# Patient Record
Sex: FEMALE | Race: WHITE | NOT HISPANIC OR LATINO | Employment: OTHER | URBAN - METROPOLITAN AREA
[De-identification: names, ages, dates, MRNs, and addresses within clinical notes are randomized per-mention and may not be internally consistent; named-entity substitution may affect disease eponyms.]

---

## 2023-02-27 PROBLEM — R05.9 COUGH: Status: ACTIVE | Noted: 2023-02-27

## 2023-02-27 PROBLEM — E78.5 HYPERLIPIDEMIA, MILD: Status: ACTIVE | Noted: 2023-02-27

## 2023-02-27 PROBLEM — R00.2 PALPITATIONS: Status: ACTIVE | Noted: 2023-02-27

## 2023-02-27 PROBLEM — C18.1 PRIMARY CANCER OF APPENDIX (MULTI): Status: ACTIVE | Noted: 2023-02-27

## 2023-02-27 PROBLEM — I10 BENIGN ESSENTIAL HYPERTENSION: Status: ACTIVE | Noted: 2023-02-27

## 2023-02-27 PROBLEM — H90.3 SENSORINEURAL HEARING LOSS, BILATERAL: Status: ACTIVE | Noted: 2023-02-27

## 2023-02-27 PROBLEM — J98.8 RESPIRATORY TRACT INFECTION: Status: ACTIVE | Noted: 2023-02-27

## 2023-02-27 PROBLEM — J45.909 ASTHMA (HHS-HCC): Status: ACTIVE | Noted: 2023-02-27

## 2023-02-27 PROBLEM — E04.1 THYROID NODULE: Status: ACTIVE | Noted: 2023-02-27

## 2023-02-27 PROBLEM — H93.13 TINNITUS, SUBJECTIVE, BILATERAL: Status: ACTIVE | Noted: 2023-02-27

## 2023-02-27 RX ORDER — ALBUTEROL SULFATE 0.83 MG/ML
3 SOLUTION RESPIRATORY (INHALATION) EVERY 6 HOURS PRN
COMMUNITY
End: 2023-06-23 | Stop reason: WASHOUT

## 2023-02-27 RX ORDER — MULTIVITAMIN
1 TABLET ORAL DAILY
COMMUNITY

## 2023-02-27 RX ORDER — LISINOPRIL 20 MG/1
1 TABLET ORAL DAILY
COMMUNITY
Start: 2014-01-23 | End: 2023-04-14 | Stop reason: SDUPTHER

## 2023-02-27 RX ORDER — MONTELUKAST SODIUM 10 MG/1
10 TABLET ORAL NIGHTLY
COMMUNITY

## 2023-02-27 RX ORDER — ALBUTEROL SULFATE 90 UG/1
1 AEROSOL, METERED RESPIRATORY (INHALATION) EVERY 4 HOURS PRN
COMMUNITY
Start: 2013-03-15 | End: 2023-04-14 | Stop reason: SDUPTHER

## 2023-02-27 RX ORDER — ASCORBIC ACID 500 MG
250 TABLET,CHEWABLE ORAL DAILY
COMMUNITY
Start: 2021-08-02

## 2023-02-27 RX ORDER — CHOLECALCIFEROL (VITAMIN D3) 125 MCG
125 CAPSULE ORAL DAILY
COMMUNITY
Start: 2019-03-22

## 2023-03-10 ENCOUNTER — TELEPHONE (OUTPATIENT)
Dept: PRIMARY CARE | Facility: CLINIC | Age: 63
End: 2023-03-10
Payer: MEDICARE

## 2023-03-10 NOTE — TELEPHONE ENCOUNTER
Pt states was advised to  CB and give you an update on symptoms. Pt states feels like symptoms are anxiety related.

## 2023-03-15 ENCOUNTER — OFFICE VISIT (OUTPATIENT)
Dept: PRIMARY CARE | Facility: CLINIC | Age: 63
End: 2023-03-15
Payer: MEDICARE

## 2023-03-15 VITALS
RESPIRATION RATE: 17 BRPM | TEMPERATURE: 98 F | WEIGHT: 144 LBS | DIASTOLIC BLOOD PRESSURE: 70 MMHG | HEIGHT: 66 IN | HEART RATE: 78 BPM | OXYGEN SATURATION: 96 % | SYSTOLIC BLOOD PRESSURE: 128 MMHG | BODY MASS INDEX: 23.14 KG/M2

## 2023-03-15 DIAGNOSIS — F41.1 GENERALIZED ANXIETY DISORDER: Primary | ICD-10-CM

## 2023-03-15 DIAGNOSIS — I10 BENIGN ESSENTIAL HYPERTENSION: ICD-10-CM

## 2023-03-15 PROCEDURE — 3078F DIAST BP <80 MM HG: CPT | Performed by: FAMILY MEDICINE

## 2023-03-15 PROCEDURE — 1036F TOBACCO NON-USER: CPT | Performed by: FAMILY MEDICINE

## 2023-03-15 PROCEDURE — 3074F SYST BP LT 130 MM HG: CPT | Performed by: FAMILY MEDICINE

## 2023-03-15 PROCEDURE — 1160F RVW MEDS BY RX/DR IN RCRD: CPT | Performed by: FAMILY MEDICINE

## 2023-03-15 PROCEDURE — 1159F MED LIST DOCD IN RCRD: CPT | Performed by: FAMILY MEDICINE

## 2023-03-15 PROCEDURE — 99213 OFFICE O/P EST LOW 20 MIN: CPT | Performed by: FAMILY MEDICINE

## 2023-03-15 ASSESSMENT — ANXIETY QUESTIONNAIRES
4. TROUBLE RELAXING: SEVERAL DAYS
IF YOU CHECKED OFF ANY PROBLEMS ON THIS QUESTIONNAIRE, HOW DIFFICULT HAVE THESE PROBLEMS MADE IT FOR YOU TO DO YOUR WORK, TAKE CARE OF THINGS AT HOME, OR GET ALONG WITH OTHER PEOPLE: SOMEWHAT DIFFICULT
2. NOT BEING ABLE TO STOP OR CONTROL WORRYING: SEVERAL DAYS
GAD7 TOTAL SCORE: 6
3. WORRYING TOO MUCH ABOUT DIFFERENT THINGS: SEVERAL DAYS
5. BEING SO RESTLESS THAT IT IS HARD TO SIT STILL: NOT AT ALL
7. FEELING AFRAID AS IF SOMETHING AWFUL MIGHT HAPPEN: NOT AT ALL
6. BECOMING EASILY ANNOYED OR IRRITABLE: NOT AT ALL
1. FEELING NERVOUS, ANXIOUS, OR ON EDGE: NEARLY EVERY DAY

## 2023-03-15 ASSESSMENT — PATIENT HEALTH QUESTIONNAIRE - PHQ9
7. TROUBLE CONCENTRATING ON THINGS, SUCH AS READING THE NEWSPAPER OR WATCHING TELEVISION: NOT AT ALL
SUM OF ALL RESPONSES TO PHQ QUESTIONS 1-9: 9
2. FEELING DOWN, DEPRESSED OR HOPELESS: MORE THAN HALF THE DAYS
1. LITTLE INTEREST OR PLEASURE IN DOING THINGS: SEVERAL DAYS
SUM OF ALL RESPONSES TO PHQ9 QUESTIONS 1 & 2: 3
4. FEELING TIRED OR HAVING LITTLE ENERGY: NEARLY EVERY DAY
9. THOUGHTS THAT YOU WOULD BE BETTER OFF DEAD, OR OF HURTING YOURSELF: NOT AT ALL
3. TROUBLE FALLING OR STAYING ASLEEP: NOT AT ALL
5. POOR APPETITE OR OVEREATING: MORE THAN HALF THE DAYS
8. MOVING OR SPEAKING SO SLOWLY THAT OTHER PEOPLE COULD HAVE NOTICED. OR THE OPPOSITE, BEING SO FIGETY OR RESTLESS THAT YOU HAVE BEEN MOVING AROUND A LOT MORE THAN USUAL: NOT AT ALL
6. FEELING BAD ABOUT YOURSELF - OR THAT YOU ARE A FAILURE OR HAVE LET YOURSELF OR YOUR FAMILY DOWN: SEVERAL DAYS
10. IF YOU CHECKED OFF ANY PROBLEMS, HOW DIFFICULT HAVE THESE PROBLEMS MADE IT FOR YOU TO DO YOUR WORK, TAKE CARE OF THINGS AT HOME, OR GET ALONG WITH OTHER PEOPLE: SOMEWHAT DIFFICULT

## 2023-03-15 NOTE — ASSESSMENT & PLAN NOTE
Blood pressure improved.  Suspect worsening numbers anxiety related.  Continue present meds  Reviewed all labs  Treatment as above.

## 2023-03-15 NOTE — PATIENT INSTRUCTIONS
New onset generalized anxiety disorder  Questionable etiology.  Reviewed all labs.  Chest x-ray normal.  Referring to behavioral health for psychotherapy.  Briefly discussed medication and side effects.  Declined at this time.  Follow-up as scheduled

## 2023-03-15 NOTE — PROGRESS NOTES
"Subjective   Patient ID: Reta Davis is a 63 y.o. female who presents for Blood Pressure Check (BP/ anxiety check).    Here with c/o anxiety    Was seen a few weeks ago for upper resp inf   Cxr normal  Bp elevated    Getting sob feelings  Was seen a few weeks ago and thoght was respiratory  Cxr neg  Feeling anxious and nervous  Feelings of skipped beats  Occ nausea, knot in her stomach  Fatiqued and no energy    Reviewed DMITRI 7  Does not feel depressed  No hx of anxiety in the past     Turned 63 and emotional  Retired 2 years ago  Neighbor passed of cancer recent, pancreatic  Hospice for a few weeks  She wonders if PTSD with what she went through   surgery   Wonders if all accumulating     has bad anxiety  Kids have anxiety  Brother medicated for anxiety  Sister anxiety but not treated.               Review of Systems    Objective   /70   Pulse 78   Temp 36.7 °C (98 °F)   Resp 17   Ht 1.664 m (5' 5.5\")   Wt 65.3 kg (144 lb)   SpO2 96%   BMI 23.60 kg/m²     Physical Exam  Psychiatric:         Mood and Affect: Mood is anxious. Affect is tearful.         Thought Content: Thought content normal.         Judgment: Judgment normal.         Assessment/Plan   Problem List Items Addressed This Visit          Circulatory    Benign essential hypertension     Blood pressure improved.  Suspect worsening numbers anxiety related.  Continue present meds  Reviewed all labs  Treatment as above.          Other Visit Diagnoses       Generalized anxiety disorder    -  Primary    Uncontrolled  Long discussion regarding medication use and side effect.  Referring to behavioral health for psychotherapy.  She will call if symptoms worsening    Relevant Orders    Follow Up In [facility]        New onset generalized anxiety disorder  Questionable etiology.  Reviewed all labs.  Chest x-ray normal.  Referring to behavioral health for psychotherapy.  Briefly " discussed medication and side effects.  Declined at this time.  Follow-up as scheduled

## 2023-03-27 ENCOUNTER — SOCIAL WORK (OUTPATIENT)
Dept: PRIMARY CARE | Facility: CLINIC | Age: 63
End: 2023-03-27
Payer: MEDICARE

## 2023-03-27 DIAGNOSIS — F41.1 GENERALIZED ANXIETY DISORDER: ICD-10-CM

## 2023-03-27 NOTE — PROGRESS NOTES
"Collaborative Care (CoCM) Initial Assessment    Session Time  Start: 8:55 AM  End: 10:06 AM     Collaborative Care program information (including case discussion with psychiatry, involvement of [facility] and billing when applicable) was provided and discussed with the patient. Patient Indicated understanding and agreed to proceed.   Confirm: Yes    No data recorded  DMITRI: 6; somewhat difficult      Reason for Visit / Chief Complaint  Chief Complaint   Patient presents with    Initial Assessment    Anxiety       Accompanied by: Self  Guardian Status: Self    Unsure if anxiety or asthma/allergies or anxiety  Hx of anxiety in family  Sob, tingly, cold hands; loss of interest  A week with no appetite, loss of interest  Situational anxiety? Panic attack?  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  Has looked at what using- perfume, laundry degergent, etc.  Some days ok, some \"get feeling of tingling across chest, arms\"  Tried deep breathing, ignoring it  Cancer 5 years ago- no problems  Year ago- next door neighbor dx with cancer,  earlier this month  Is it anxiety bringing on asthma or asthma bringing anxiety      Review of Symptoms    Sleep   Average Hours Sleep in/Night: \"6-7 hrs sleep; once I fall asleep I sleep ok\" wakes up during the night but typically falls back asleep. Reports typically waking rested, but \"some mornings I drag myself to the shower\".  Bed is propped up    Mood   Symptom Onset/Duration: Last 1-2 years - symptoms appear to have really started when patient retired from her job.  Current Sx: little interest/pleasure doing things, feeling down, feeling depressed, trouble falling asleep, low motivation, poor appetite, feeling bad about self, isolating from others, and unhelpful thinking " "pattern  Triggers: no known triggers    Sometimes feels inadequate; lost 8 lbs. \"I don't hate myself, always want to be something better than you are\" \"underachieving. 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  Current Sx: feeling nervous/anxious/on edge, difficulty stopping/controlling worry, negative thought of self, avoidance, and panic attack(s); always hesitant to be in big crowds like [employer], prefer to stay at home- always been more of a homebody  Panic / Somatic Sx: chest pain/discomfort, shaking/jitters, dry mouth, and abdominal pain  Triggers: large crowds, being around others  Nothing in particular to cause episodes of anxiety/panic- at home, going into a store- unsure if subconscious is triggering anxiety- \"sitting in living room watching tv\" \"riding to store\" \"throwing out garbage\"    Self-Esteem / Self-Image   Self Esteem Rating (1-10 Scale, 10 being high): 7  Self-Esteem / Self Image Sx: feels useless at times and compares self to others; Puts pressure on self; trying to cut back on expectations of holidays, etc.    Appetite   Description of Overall Appetite: decreased appetite  Concerns with appetite: loss of appetite and not feeling hungry often  When hungry, will get irritable    Anger / Irritability  Symptoms of Anger / Irritability: denies major concerns    Communication / Self Expression  Communication Style & Concerns: no concerns    Trauma    Traumatic Experiences: serious life threatening illness  Current Symptoms Related to Traumatic Experience: " denies    Grief / Loss / Adjustment   Denies actively grieving any losses currently.  Patient reported that she has had a lot of losses in her life over the past years, particularly family members  Neighbor recently - had cancer  YEYO    Mom-   Sister   MIL-     Hallucinations / Delusions   Hallucinations & Delusions Experienced: none, denied    Learning Concerns / Memory   Learning Concerns & Sx: none, denied  Memory Concerns & Sx: none, denied    Functional impairment   Impacting ADL's: no impairment   Impacting IADL's: No impairment  Impacting Ability : No impairment    Associated Medical Concerns   Potential Associated Factors: hyperlipidemia and hypertension; asthma, previous cancer dx      Comprehensive Behavioral Health History     Medications  Current Mental Health Medications:   none    Past Mental Health Medications: none      Concerns / challenges / barriers with taking medications?     Open to medication recommendations from consulting psychiatrist? Not sure; doesn't like taking medication- side effects are worse than pain      Mental Health Treatment History  Mental Health Treatment: no hx    Risk History  Suicidal Thoughts/Method/Intent/Plan: None, denied  Suicide Attempts/Preparations: None, denied  Non-Suicidal Self Injury: None, denied  Last Amlin Risk Score:    Protective Factors: strong protective factors, social support/connectedness, positive family relationships, and marriage/partnership    Violence: None, denied  Homicidal Thoughts/Method/Plan/Intent: None, denied  Homicidal Attempts/Preparations: None, denied        Substance Use History    Substances    Social History     Substance and Sexual Activity   Alcohol Use Not Currently     Social History     Substance and Sexual Activity   Drug Use Not Currently       Substance Current Use   Denies use                    Addiction Treatment   Denies    Family History    Mental Health / Conditions    Family Member Condition  "/ Diagnosis Medications / Side Effects   Spouse Anxiety disorder; goes back to childhood and worked through it with counseling. Did not use medication None/Unknown   Children  Daughter- \"go in a room and cry\"  Son- \"sit away from others til passes\" Anxiety disorder None/Unknown   Mother   Anxiety disorder; got a \"real nervous stomach\" Valium          Substance Use    Family Member Substance Current Use   Denies                        History of Suicide    Family Member Details   Denies            Social History    Housing   Living Situation: lives with spouse and adult son  Safe Housing Conditions / Feels Safe in Home: Yes    Employment  Current Employment: retired  Current Concerns/Challenges: No    Income   Current Concerns/Challenges: No    Education   Status / Level of Education: Completed high school and GED    Legal   Legal Considerations: None, denied    Relationships   S/O:  has a positive relationship with   Children: 2 adult children, daughter  and son who lives at home.  Friends: Reports she does not have many close friendships.         Active Duty? No    Sexuality / Gender   Concerns with Sexuality/Gender: None, denied    Transportation   Transportation Concerns: None, denied    Jehovah's witness/ Spirituality   Are you Rastafari or Spiritual: Yes  Jehovah's witness / Practice: Anabaptism doesn't go to [employer] weekly, spirituality helped during cancer treatment  Spiritual Practice: None, denied    Coping / Strengths / Supports   Coping:  breathing exercises; positive thoughts \"It's going to pass\"  Strengths: forgiving, good listener, and honest running the household- paying bills, organization- OCD  Supports: Spouse      Abuse History  Physical Abuse: No  Sexual Abuse: No  Verbal / Emotional Abuse / Bullying (+Cyber): No   Financial Abuse: No  Domestic Violence: No    Assessment Summary  / Plan    Assessment Summary:  What do you want to work on/get out of collaborative care? A feeling that anxiety can " "be controlled, if that's what it is. Anything I can do for myself to help- get involved in activities, hobbies\"    Plan:   Psych consult - ongoing, set meeting frequency, bi-weekly, Mgtsrkh-Nxzswtvs-Ajepritd interventions, provide psycho-education, provide appropriate tx referrals, and provide appropriate resources    No follow-ups on file.    Provisional Findings / Impressions  Primary: F41.9 Unspecified Anxiety D/O    Secondary:     Goals    Care Plan    There is no care plan documentation to display.       "

## 2023-03-29 ENCOUNTER — DOCUMENTATION (OUTPATIENT)
Dept: BEHAVIORAL HEALTH | Facility: CLINIC | Age: 63
End: 2023-03-29
Payer: MEDICARE

## 2023-03-29 NOTE — Clinical Note
DONNY regarding my recommendations based on our team's discussion in collaborative care; in brief we're going to focus on therapeutic engagement for now and hold off on starting psychotropic meds at this time. My one medical suggestion is to make sure she's not worsening her anxiety symptoms by treating anxiety/panic-related dyspnea with her albuterol - which could exacerbate tachycardia and worsen the spiral of symptoms.   Please feel free to reach out with any questions / comments / feedback / concerns.   Nic, Conner

## 2023-03-29 NOTE — PROGRESS NOTES
[facility] Psychiatry Consult Note     Reta Davis is a 63 y.o., referred to Collaborative Care for symptoms of anxiety and depression. I have reviewed the patient with the behavioral health manager and reviewed the patient's electronic record. Pertinent highlights of discussion and chart review include the following:   Sx: Episodes of anxiety (tingly hands, etc) that occur without identified triggers; patient unsure if anxiety triggers asthma, or the other way around. Avoids things that could induce panic (e.g., large crowds). Variable appetite - will go for a week with low appetite. Sometimes feels inadequate / underachieving (especially since retiring).   Sleep: difficulty with sleep initiation; sleeps propped up (attributes to asthma)  Substances: not pertinent   Context: retired ~2yrs ago, with subsequent decrease in both scheduled activities as well as socialization   Trauma: Significant losses in past ~15-20yrs (mother, sister, sister in law, mother in law, neighbor). Had cancer 5 yrs ago (currently cancer free); neighbor diagnosed with cancer last year, and  earlier this month.   No history of prior  care / concerns.   Current psych medications: none    Prior psych medications: none   Patient wishes: In general doesn't like taking meds (in general feels that AEs are worse than the med benefits); prefers to avoid psychotropic medications but open if necessary. Wants to feel that anxiety can be controlled.     Patient Health Questionnaire-9 Score: 9 (3/15/2023  4:21 PM)    Additional data:   Recent labs: CBC and TSH not pertinent 23   Pertinent PMH: asthma   Pertinent non-psychiatric meds: albuterol      Recommendations:   Behavioral health manager (BHM) to continue to engage with patient   Will defer initiation of psychotropic medications at this time - will follow response to therapeutic engagement with BHM at this time.   Recommend minimizing use of PRN albuterol in response to anxiety-induced  dyspnea due to likely subsequent exacerbation of tachycardia, potentially leading to further anxiety worsening  Recommend BHM use motivational interviewing to facilitate healthy lifestyle choices (e.g. consistent engagement with mental health services), explore pattern of symptoms and associated stressors / life events, and engage in behavioral activation as discussed in case review    The above treatment considerations and suggestions are based on consultations with the patient's care manager and a review of information available in the electronic medical record. I have not personally examined the patient. All recommendations should be implemented with consideration of the patient's relevant prior history and current clinical status. Please feel free to call me with any questions about the care of this patient. I can easily be reached through [employer], or via email ([email or web address]).

## 2023-04-03 ENCOUNTER — SOCIAL WORK (OUTPATIENT)
Dept: PRIMARY CARE | Facility: CLINIC | Age: 63
End: 2023-04-03
Payer: MEDICARE

## 2023-04-03 NOTE — PROGRESS NOTES
Collaborative Care (CoCM)  Progress Note    Type of Interaction: In Office    Start Time: 9:00 AM    End Time: 9:25 AM        Appointment: Scheduled    Reason for Visit:   Chief Complaint   Patient presents with    Follow-up    Anxiety          Interval History / Patient Symptoms:     No data recorded    Patient reports she has reduced number of times she uses inhaler; only uses when coughing and not as preventative measure  Last used inhaler on Friday, was previously using every AM and PM.  States that her anxiety symptoms have reduced and has not had any episodes of anxiety.    Reports she is not using Singulair currently and is using less cough syrup (robitussin) for cough    Interventions Provided: Treatment Planning      Progress Made: N/A    Response to Intervention: Discussed Psych Consult recommendations.  Patient is willing to engage with [facility]; would like to start at every other week and then possibly monthly, depending on how she is doing.  In agreement with Behavioral Activation and motivational interviewing to explore interests and activities that patient can do now that she is retired.  In agreement to hold of on anxiety medications for now.        Plan:     Care Plan    There is no care plan documentation to display.         Patient Instructions   Follow up in 2 weeks, prior to PCP appointment- 4/14/23 @ 11:00am  Monitor/track moods and symptoms prior to and after using inhaler as a way to determine anxiety vs. Asthma reaction  Identify activities and areas of interest to further explore and pursue at next appointment.      Follow Up / Next Appointment: Next appointment: 04/14/23 @ 11:00 AM

## 2023-04-03 NOTE — PROGRESS NOTES
Collaborative Care (CoCM)  Progress Note    Type of Interaction: {Type of Interaction:40634}    Start Time: ***    End Time: ***        Appointment: {Appointment:90064}    Reason for Visit: No chief complaint on file.   ***      Interval History / Patient Symptoms:     No data recorded      Interventions Provided: {Interventions Provided:72959}      Progress Made: {Progress Made:99792}    Response to Intervention: ***        Plan:     Care Plan    There is no care plan documentation to display.         There are no Patient Instructions on file for this visit.      Follow Up / Next Appointment:

## 2023-04-03 NOTE — PATIENT INSTRUCTIONS
Follow up in 2 weeks, prior to PCP appointment- 4/14/23 @ 11:00am  Monitor/track moods and symptoms prior to and after using inhaler as a way to determine anxiety vs. Asthma reaction  Identify activities and areas of interest to further explore and pursue at next appointment.

## 2023-04-07 ENCOUNTER — DOCUMENTATION (OUTPATIENT)
Dept: PRIMARY CARE | Facility: CLINIC | Age: 63
End: 2023-04-07
Payer: MEDICARE

## 2023-04-07 DIAGNOSIS — F41.9 ANXIETY AND DEPRESSION: Primary | ICD-10-CM

## 2023-04-07 DIAGNOSIS — F32.A ANXIETY AND DEPRESSION: Primary | ICD-10-CM

## 2023-04-07 DIAGNOSIS — F41.1 GENERALIZED ANXIETY DISORDER: ICD-10-CM

## 2023-04-07 PROCEDURE — 99492 1ST PSYC COLLAB CARE MGMT: CPT | Performed by: FAMILY MEDICINE

## 2023-04-14 ENCOUNTER — SOCIAL WORK (OUTPATIENT)
Dept: PRIMARY CARE | Facility: CLINIC | Age: 63
End: 2023-04-14
Payer: MEDICARE

## 2023-04-14 ENCOUNTER — OFFICE VISIT (OUTPATIENT)
Dept: PRIMARY CARE | Facility: CLINIC | Age: 63
End: 2023-04-14
Payer: MEDICARE

## 2023-04-14 VITALS
WEIGHT: 141.6 LBS | BODY MASS INDEX: 22.76 KG/M2 | SYSTOLIC BLOOD PRESSURE: 132 MMHG | OXYGEN SATURATION: 96 % | HEART RATE: 95 BPM | DIASTOLIC BLOOD PRESSURE: 66 MMHG | HEIGHT: 66 IN | RESPIRATION RATE: 16 BRPM | TEMPERATURE: 97.7 F

## 2023-04-14 DIAGNOSIS — I10 BENIGN ESSENTIAL HYPERTENSION: ICD-10-CM

## 2023-04-14 DIAGNOSIS — F41.1 GENERALIZED ANXIETY DISORDER: ICD-10-CM

## 2023-04-14 DIAGNOSIS — J45.40 MODERATE PERSISTENT EXTRINSIC ASTHMA WITHOUT COMPLICATION (HHS-HCC): ICD-10-CM

## 2023-04-14 DIAGNOSIS — F33.9 DEPRESSION, RECURRENT (CMS-HCC): Primary | ICD-10-CM

## 2023-04-14 DIAGNOSIS — E78.5 HYPERLIPIDEMIA, MILD: ICD-10-CM

## 2023-04-14 DIAGNOSIS — L30.9 HAND ECZEMA: ICD-10-CM

## 2023-04-14 DIAGNOSIS — F41.1 GENERALIZED ANXIETY DISORDER: Primary | ICD-10-CM

## 2023-04-14 DIAGNOSIS — R00.2 PALPITATIONS: ICD-10-CM

## 2023-04-14 PROBLEM — R05.9 COUGH: Status: RESOLVED | Noted: 2023-02-27 | Resolved: 2023-04-14

## 2023-04-14 PROBLEM — J98.8 RESPIRATORY TRACT INFECTION: Status: RESOLVED | Noted: 2023-02-27 | Resolved: 2023-04-14

## 2023-04-14 PROCEDURE — 1160F RVW MEDS BY RX/DR IN RCRD: CPT | Performed by: FAMILY MEDICINE

## 2023-04-14 PROCEDURE — 1159F MED LIST DOCD IN RCRD: CPT | Performed by: FAMILY MEDICINE

## 2023-04-14 PROCEDURE — 99214 OFFICE O/P EST MOD 30 MIN: CPT | Performed by: FAMILY MEDICINE

## 2023-04-14 PROCEDURE — 3075F SYST BP GE 130 - 139MM HG: CPT | Performed by: FAMILY MEDICINE

## 2023-04-14 PROCEDURE — 3078F DIAST BP <80 MM HG: CPT | Performed by: FAMILY MEDICINE

## 2023-04-14 PROCEDURE — 1036F TOBACCO NON-USER: CPT | Performed by: FAMILY MEDICINE

## 2023-04-14 RX ORDER — LISINOPRIL 20 MG/1
20 TABLET ORAL DAILY
Qty: 90 TABLET | Refills: 1 | Status: SHIPPED | OUTPATIENT
Start: 2023-04-14 | End: 2023-10-16 | Stop reason: SDUPTHER

## 2023-04-14 RX ORDER — MONTELUKAST SODIUM 10 MG/1
10 TABLET ORAL NIGHTLY
Qty: 30 TABLET | Refills: 5 | Status: SHIPPED | OUTPATIENT
Start: 2023-04-14 | End: 2023-05-26 | Stop reason: ALTCHOICE

## 2023-04-14 RX ORDER — ALBUTEROL SULFATE 90 UG/1
1 AEROSOL, METERED RESPIRATORY (INHALATION) EVERY 4 HOURS PRN
Qty: 18 G | Refills: 3 | Status: SHIPPED | OUTPATIENT
Start: 2023-04-14 | End: 2024-01-19 | Stop reason: SDUPTHER

## 2023-04-14 ASSESSMENT — PATIENT HEALTH QUESTIONNAIRE - PHQ9
1. LITTLE INTEREST OR PLEASURE IN DOING THINGS: NOT AT ALL
2. FEELING DOWN, DEPRESSED OR HOPELESS: NOT AT ALL
SUM OF ALL RESPONSES TO PHQ9 QUESTIONS 1 AND 2: 0

## 2023-04-14 NOTE — PROGRESS NOTES
"Subjective   Patient ID: Reta Davis is a 63 y.o. female who presents for No chief complaint on file..    Here for follow-up hypertension, palpitations and anxiety.    Last visit EKG was normal, labs were normal including TSH  Cholesterol borderline elevated although improved.     Thought palpitations due to anxiety.  Taking meds daily for HTN and tolerating well.  Home BP checks.     Seeing behavioral health here regularly.  Has apt today with Jennifer  Had a bad episode yesterday  Had a feeling of palps and caused anxiousness, loss of appetite, no energy and anxious feeling    Cough and takes 1/2 tsp of robitussin, MDI use at night but cut back  Hx of allergy induced asthma  Singulair use helped in the past     Seeing oncology in May    Dry skin and itching and pain in hands,  Using cortisone 10, neopsorin and creams and not helping           Review of Systems    Objective   /66   Pulse 95   Temp 36.5 °C (97.7 °F)   Resp 16   Ht 1.664 m (5' 5.5\")   Wt 64.2 kg (141 lb 9.6 oz)   SpO2 96%   BMI 23.21 kg/m²     Physical Exam  Vitals and nursing note reviewed.   Constitutional:       Appearance: Normal appearance.   Cardiovascular:      Rate and Rhythm: Normal rate and regular rhythm.   Pulmonary:      Effort: Pulmonary effort is normal.      Breath sounds: Normal breath sounds.   Musculoskeletal:      Cervical back: Normal range of motion.   Neurological:      Mental Status: She is alert.   Psychiatric:         Mood and Affect: Mood normal.         Behavior: Behavior normal.         Thought Content: Thought content normal.         Judgment: Judgment normal.         Assessment/Plan   Problem List Items Addressed This Visit          Respiratory    Extrinsic asthma without complication     Uncontrolled  Trial of Mucinex OTC.  Starting Singulair 10 mg 1 tab daily.  Use and side effect profile reviewed.  Recheck in 6 weeks         Relevant Medications    montelukast (Singulair) 10 mg tablet    albuterol 90 " mcg/actuation inhaler       Circulatory    Benign essential hypertension     Stable  Refilling medication at same dose.  Reviewed all labs.  Recheck in 6 weeks         Relevant Medications    lisinopril 20 mg tablet    Palpitations     Stable  Reviewed all labs.  Reviewed behavioral health notes.  Continue counseling.  Continue to limit MDI use.  Recheck in 6 weeks            Musculoskeletal    Hand eczema     Uncontrolled  Continue topical over-the-counter cortisone creams.  Add strong moisturizing cream.  Recheck in 6 weeks            Other    Hyperlipidemia, mild     Stable  Reviewed all labs.  Continue diet changes.  Recheck in 6 months         Generalized anxiety disorder     Improving.  Continue counseling.  Reviewed psychiatry recommendations  Recheck in 6 weeks         Depression, recurrent (CMS/HCC) - Primary     Improving.  Continue counseling.  Recheck in 6 weeks

## 2023-04-14 NOTE — PROGRESS NOTES
Collaborative Care (CoCM)  Progress Note    Type of Interaction: In Office    Start Time: 12:15 PM    End Time: 1:08 PM    Appointment: Scheduled    Reason for Visit:   Chief Complaint   Patient presents with    Follow-up    Anxiety      Interval History / Patient Symptoms:     No data recorded  Had an episode of panic attack- day before was neighbor's 80th birthday; disinterest in eating; unsure if she puts herself in situations of others?   Some of the anxiety/sadness started when neighbor was dx with cancer- unsure if any of it is medical PTSD on her end      Interventions Provided: Psychoeducation, Strengths Exploration, and Values Exploration      Progress Made: Moderate    Response to Intervention: Discussed grief/loss, empathy and ways to use her empathy in a healthy way.        Plan:     Care Plan    There is no care plan documentation to display.         There are no Patient Instructions on file for this visit.      Follow Up / Next Appointment: Next appointment: 04/28/23

## 2023-04-14 NOTE — ASSESSMENT & PLAN NOTE
Stable  Reviewed all labs.  Reviewed behavioral health notes.  Continue counseling.  Continue to limit MDI use.  Recheck in 6 weeks

## 2023-04-14 NOTE — ASSESSMENT & PLAN NOTE
Uncontrolled  Continue topical over-the-counter cortisone creams.  Add strong moisturizing cream.  Recheck in 6 weeks

## 2023-04-28 ENCOUNTER — SOCIAL WORK (OUTPATIENT)
Dept: PRIMARY CARE | Facility: CLINIC | Age: 63
End: 2023-04-28
Payer: MEDICARE

## 2023-04-28 DIAGNOSIS — F41.1 GENERALIZED ANXIETY DISORDER: Primary | ICD-10-CM

## 2023-04-28 ASSESSMENT — PATIENT HEALTH QUESTIONNAIRE - PHQ9
4. FEELING TIRED OR HAVING LITTLE ENERGY: MORE THAN HALF THE DAYS
7. TROUBLE CONCENTRATING ON THINGS, SUCH AS READING THE NEWSPAPER OR WATCHING TELEVISION: NOT AT ALL
6. FEELING BAD ABOUT YOURSELF - OR THAT YOU ARE A FAILURE OR HAVE LET YOURSELF OR YOUR FAMILY DOWN: SEVERAL DAYS
8. MOVING OR SPEAKING SO SLOWLY THAT OTHER PEOPLE COULD HAVE NOTICED. OR THE OPPOSITE, BEING SO FIGETY OR RESTLESS THAT YOU HAVE BEEN MOVING AROUND A LOT MORE THAN USUAL: NOT AT ALL
2. FEELING DOWN, DEPRESSED OR HOPELESS: NOT AT ALL
SUM OF ALL RESPONSES TO PHQ9 QUESTIONS 1 & 2: 0
10. IF YOU CHECKED OFF ANY PROBLEMS, HOW DIFFICULT HAVE THESE PROBLEMS MADE IT FOR YOU TO DO YOUR WORK, TAKE CARE OF THINGS AT HOME, OR GET ALONG WITH OTHER PEOPLE: SOMEWHAT DIFFICULT
9. THOUGHTS THAT YOU WOULD BE BETTER OFF DEAD, OR OF HURTING YOURSELF: NOT AT ALL
SUM OF ALL RESPONSES TO PHQ QUESTIONS 1-9: 4
5. POOR APPETITE OR OVEREATING: SEVERAL DAYS
3. TROUBLE FALLING OR STAYING ASLEEP: NOT AT ALL
1. LITTLE INTEREST OR PLEASURE IN DOING THINGS: NOT AT ALL

## 2023-04-28 ASSESSMENT — ANXIETY QUESTIONNAIRES
5. BEING SO RESTLESS THAT IT IS HARD TO SIT STILL: NOT AT ALL
GAD7 TOTAL SCORE: 3
4. TROUBLE RELAXING: NOT AT ALL
1. FEELING NERVOUS, ANXIOUS, OR ON EDGE: SEVERAL DAYS
IF YOU CHECKED OFF ANY PROBLEMS ON THIS QUESTIONNAIRE, HOW DIFFICULT HAVE THESE PROBLEMS MADE IT FOR YOU TO DO YOUR WORK, TAKE CARE OF THINGS AT HOME, OR GET ALONG WITH OTHER PEOPLE: SOMEWHAT DIFFICULT
3. WORRYING TOO MUCH ABOUT DIFFERENT THINGS: MORE THAN HALF THE DAYS
2. NOT BEING ABLE TO STOP OR CONTROL WORRYING: NOT AT ALL
6. BECOMING EASILY ANNOYED OR IRRITABLE: NOT AT ALL
7. FEELING AFRAID AS IF SOMETHING AWFUL MIGHT HAPPEN: NOT AT ALL

## 2023-04-28 NOTE — PROGRESS NOTES
"Collaborative Care (CoCM)  Progress Note    Type of Interaction: In Office    Start Time: 12:40 PM    End Time: 1:30 PM        Appointment: Scheduled    Reason for Visit:   Chief Complaint   Patient presents with    Follow-up        Interval History / Patient Symptoms:     Patient Health Questionnaire-9 Score: 4 (4/28/2023 12:46 PM)  DMITRI-7 Total Score: 3 (4/28/2023 12:42 PM)        Interventions Provided: Behavioral Activation and Motivational Interviewing      Progress Made: Moderate    Response to Intervention:   Would like to get into an exercise routine - chair yoga   Continuing to clean/organize the house  Able to manage things better over the past few weeks \"doing my best\"      Plan:     Care Plan    There is no care plan documentation to display.         There are no Patient Instructions on file for this visit.      Follow Up / Next Appointment: Next appointment: 05/12/23      "

## 2023-05-01 ENCOUNTER — DOCUMENTATION (OUTPATIENT)
Dept: PRIMARY CARE | Facility: CLINIC | Age: 63
End: 2023-05-01
Payer: MEDICARE

## 2023-05-01 DIAGNOSIS — F41.1 GENERALIZED ANXIETY DISORDER: Primary | ICD-10-CM

## 2023-05-01 PROCEDURE — 99494 1ST/SBSQ PSYC COLLAB CARE: CPT | Performed by: FAMILY MEDICINE

## 2023-05-01 PROCEDURE — 99493 SBSQ PSYC COLLAB CARE MGMT: CPT | Performed by: FAMILY MEDICINE

## 2023-05-12 ENCOUNTER — SOCIAL WORK (OUTPATIENT)
Dept: PRIMARY CARE | Facility: CLINIC | Age: 63
End: 2023-05-12
Payer: MEDICARE

## 2023-05-12 DIAGNOSIS — F41.1 GENERALIZED ANXIETY DISORDER: Primary | ICD-10-CM

## 2023-05-12 ASSESSMENT — ANXIETY QUESTIONNAIRES
IF YOU CHECKED OFF ANY PROBLEMS ON THIS QUESTIONNAIRE, HOW DIFFICULT HAVE THESE PROBLEMS MADE IT FOR YOU TO DO YOUR WORK, TAKE CARE OF THINGS AT HOME, OR GET ALONG WITH OTHER PEOPLE: SOMEWHAT DIFFICULT
6. BECOMING EASILY ANNOYED OR IRRITABLE: SEVERAL DAYS
2. NOT BEING ABLE TO STOP OR CONTROL WORRYING: MORE THAN HALF THE DAYS
4. TROUBLE RELAXING: MORE THAN HALF THE DAYS
5. BEING SO RESTLESS THAT IT IS HARD TO SIT STILL: NOT AT ALL
1. FEELING NERVOUS, ANXIOUS, OR ON EDGE: NEARLY EVERY DAY
3. WORRYING TOO MUCH ABOUT DIFFERENT THINGS: SEVERAL DAYS
GAD7 TOTAL SCORE: 9
7. FEELING AFRAID AS IF SOMETHING AWFUL MIGHT HAPPEN: NOT AT ALL

## 2023-05-12 ASSESSMENT — PATIENT HEALTH QUESTIONNAIRE - PHQ9
10. IF YOU CHECKED OFF ANY PROBLEMS, HOW DIFFICULT HAVE THESE PROBLEMS MADE IT FOR YOU TO DO YOUR WORK, TAKE CARE OF THINGS AT HOME, OR GET ALONG WITH OTHER PEOPLE: SOMEWHAT DIFFICULT
2. FEELING DOWN, DEPRESSED OR HOPELESS: MORE THAN HALF THE DAYS
SUM OF ALL RESPONSES TO PHQ9 QUESTIONS 1 & 2: 5
3. TROUBLE FALLING OR STAYING ASLEEP: NOT AT ALL
1. LITTLE INTEREST OR PLEASURE IN DOING THINGS: NEARLY EVERY DAY
5. POOR APPETITE OR OVEREATING: NEARLY EVERY DAY
8. MOVING OR SPEAKING SO SLOWLY THAT OTHER PEOPLE COULD HAVE NOTICED. OR THE OPPOSITE, BEING SO FIGETY OR RESTLESS THAT YOU HAVE BEEN MOVING AROUND A LOT MORE THAN USUAL: NOT AT ALL
9. THOUGHTS THAT YOU WOULD BE BETTER OFF DEAD, OR OF HURTING YOURSELF: NOT AT ALL
6. FEELING BAD ABOUT YOURSELF - OR THAT YOU ARE A FAILURE OR HAVE LET YOURSELF OR YOUR FAMILY DOWN: MORE THAN HALF THE DAYS
4. FEELING TIRED OR HAVING LITTLE ENERGY: NEARLY EVERY DAY
SUM OF ALL RESPONSES TO PHQ QUESTIONS 1-9: 13
7. TROUBLE CONCENTRATING ON THINGS, SUCH AS READING THE NEWSPAPER OR WATCHING TELEVISION: NOT AT ALL

## 2023-05-12 NOTE — PROGRESS NOTES
"Collaborative Care (CoCM)  Progress Note    Type of Interaction: In Office    Start Time: 1:02 PM    End Time: 2:00 PM        Appointment: Scheduled    Reason for Visit:   Chief Complaint   Patient presents with    Follow-up    Depression      Interval History / Patient Symptoms:     Patient Health Questionnaire-9 Score: 13 (5/12/2023  2:06 PM)  DMITRI-7 Total Score: 9 (5/12/2023  2:05 PM)    Has had a difficult week; feeling very down and depressed.  Very nervous; struggles to eat, worries about \"wasting away\" - often supplements diet with ensure    Interventions Provided: Psychoeducation and Motivational Interviewing      Progress Made: N/A    Response to Intervention: Patient explored the pro's and con's of starting a medication to assist with her fluctuating moods.  Patient is agreeable to try medication, as she states she has tried everything else with little improvement.          Plan:   M will discuss medication suggestions with consulting psychiatrist. Patient has a F/U appointment in 1 week to discuss medication suggestions.    Follow Up / Next Appointment: Next appointment: 05/18/23      "

## 2023-05-17 ENCOUNTER — DOCUMENTATION (OUTPATIENT)
Dept: BEHAVIORAL HEALTH | Facility: CLINIC | Age: 63
End: 2023-05-17
Payer: MEDICARE

## 2023-05-17 NOTE — PROGRESS NOTES
[facility] Psychiatry Follow-Up Note     Reta Davis is a 63 y.o., referred to Collaborative Care for reconsideration of prior recommendations due to patient interest in pharmacotherapy in the context of persistent functional impairment. I have reviewed the patient with the behavioral health manager and reviewed the patient's electronic record. Patient was first discussed in collaborative care 3/29/23 due to anxiety and depression, at which time medication recommendations were deferred at patient preference.     Pertinent highlights of discussion and chart review include the following:   Sx: Episodes of anxiety (tingly hands, etc) that occur without identified triggers; patient unsure if anxiety triggers asthma, or the other way around. Avoids things that could induce panic (e.g., large crowds). Variable appetite - will go for a week with low appetite. Sometimes feels inadequate / underachieving (especially since retiring).   Sleep: difficulty with sleep initiation; sleeps propped up (attributes to asthma)  Substances: not pertinent   VS at last PCP visit (4/14/23): 132/66, HR 95   Patient reported no longer using inhaler as much (only when she coughs and not for prevention as she had previously been doing).     Patient Health Questionnaire-9 Score: 13 (5/12/2023  2:06 PM)  DMITRI-7 Total Score: 9 (5/12/2023  2:05 PM)    Recommendations:   Behavioral health manager (BHM) to continue to engage with patient   Recommend initiation of sertraline 25mg at night, with increase to 50mg at night after one week. Once patient is tolerating 50mg dose, would continue to increase in 25-50mg increments every 4-6 weeks as tolerated until appropriate treatment response has been achieved, to maximum dose of 200mg daily. Common adverse effects usually resolve within days to two weeks, and include GI upset / fatigue or insomnia / dizziness / agitation / tremors / dry mouth / sweating. If patient experiences worsening insomnia would recommend  retiming administration to the morning. Sexual dysfunction may occur and is unlikely to self-resolve.   Recommend BHM use motivational interviewing to facilitate healthy lifestyle choices (e.g. adherence to medications, consistent engagement with mental health services), explore pattern of symptoms and associated stressors / life events, and engage in behavioral activation as discussed in case review     The above treatment considerations and suggestions are based on consultations with the patient's care manager and a review of information available in the electronic medical record. I have not personally examined the patient. All recommendations should be implemented with consideration of the patient's relevant prior history and current clinical status. Please feel free to call me with any questions about the care of this patient. I can easily be reached through [employer], or via email ([email or web address]).

## 2023-05-17 NOTE — Clinical Note
DONNY regarding my recommendations based on our team's discussion in collaborative care; in brief I'd suggest initiating her on sertraline for initial pharmacotherapy.   Please feel free to reach out with any questions / comments / feedback / concerns.   Conner Lucero

## 2023-05-18 ENCOUNTER — SOCIAL WORK (OUTPATIENT)
Dept: PRIMARY CARE | Facility: CLINIC | Age: 63
End: 2023-05-18
Payer: MEDICARE

## 2023-05-18 DIAGNOSIS — F41.1 GENERALIZED ANXIETY DISORDER: Primary | ICD-10-CM

## 2023-05-18 RX ORDER — SERTRALINE HYDROCHLORIDE 25 MG/1
25 TABLET, FILM COATED ORAL DAILY
Qty: 30 TABLET | Refills: 1 | Status: SHIPPED | OUTPATIENT
Start: 2023-05-18 | End: 2023-06-23 | Stop reason: ALTCHOICE

## 2023-05-18 ASSESSMENT — PATIENT HEALTH QUESTIONNAIRE - PHQ9
SUM OF ALL RESPONSES TO PHQ9 QUESTIONS 1 & 2: 2
9. THOUGHTS THAT YOU WOULD BE BETTER OFF DEAD, OR OF HURTING YOURSELF: NOT AT ALL
10. IF YOU CHECKED OFF ANY PROBLEMS, HOW DIFFICULT HAVE THESE PROBLEMS MADE IT FOR YOU TO DO YOUR WORK, TAKE CARE OF THINGS AT HOME, OR GET ALONG WITH OTHER PEOPLE: NOT DIFFICULT AT ALL
3. TROUBLE FALLING OR STAYING ASLEEP: NOT AT ALL
4. FEELING TIRED OR HAVING LITTLE ENERGY: SEVERAL DAYS
5. POOR APPETITE OR OVEREATING: NOT AT ALL
8. MOVING OR SPEAKING SO SLOWLY THAT OTHER PEOPLE COULD HAVE NOTICED. OR THE OPPOSITE, BEING SO FIGETY OR RESTLESS THAT YOU HAVE BEEN MOVING AROUND A LOT MORE THAN USUAL: NOT AT ALL
SUM OF ALL RESPONSES TO PHQ QUESTIONS 1-9: 4
7. TROUBLE CONCENTRATING ON THINGS, SUCH AS READING THE NEWSPAPER OR WATCHING TELEVISION: NOT AT ALL
6. FEELING BAD ABOUT YOURSELF - OR THAT YOU ARE A FAILURE OR HAVE LET YOURSELF OR YOUR FAMILY DOWN: SEVERAL DAYS
2. FEELING DOWN, DEPRESSED OR HOPELESS: SEVERAL DAYS
1. LITTLE INTEREST OR PLEASURE IN DOING THINGS: SEVERAL DAYS

## 2023-05-18 ASSESSMENT — ANXIETY QUESTIONNAIRES
IF YOU CHECKED OFF ANY PROBLEMS ON THIS QUESTIONNAIRE, HOW DIFFICULT HAVE THESE PROBLEMS MADE IT FOR YOU TO DO YOUR WORK, TAKE CARE OF THINGS AT HOME, OR GET ALONG WITH OTHER PEOPLE: NOT DIFFICULT AT ALL
5. BEING SO RESTLESS THAT IT IS HARD TO SIT STILL: NOT AT ALL
4. TROUBLE RELAXING: SEVERAL DAYS
6. BECOMING EASILY ANNOYED OR IRRITABLE: SEVERAL DAYS
3. WORRYING TOO MUCH ABOUT DIFFERENT THINGS: MORE THAN HALF THE DAYS
GAD7 TOTAL SCORE: 9
2. NOT BEING ABLE TO STOP OR CONTROL WORRYING: NEARLY EVERY DAY
1. FEELING NERVOUS, ANXIOUS, OR ON EDGE: MORE THAN HALF THE DAYS
7. FEELING AFRAID AS IF SOMETHING AWFUL MIGHT HAPPEN: NOT AT ALL

## 2023-05-25 PROBLEM — H61.21 IMPACTED CERUMEN OF RIGHT EAR: Status: ACTIVE | Noted: 2023-05-25

## 2023-05-25 PROBLEM — N20.0 KIDNEY STONE: Status: ACTIVE | Noted: 2023-05-25

## 2023-05-25 PROBLEM — D24.2 FIBROADENOMA OF LEFT BREAST: Status: ACTIVE | Noted: 2023-05-25

## 2023-05-25 PROBLEM — N63.20 LEFT BREAST MASS: Status: ACTIVE | Noted: 2023-05-25

## 2023-05-25 PROBLEM — L30.4 INTERTRIGO: Status: ACTIVE | Noted: 2023-05-25

## 2023-05-25 PROBLEM — R31.9 HEMATURIA: Status: ACTIVE | Noted: 2023-05-25

## 2023-05-25 PROBLEM — J30.9 ALLERGIC RHINITIS: Status: ACTIVE | Noted: 2023-05-25

## 2023-05-25 PROBLEM — R31.0 GROSS HEMATURIA: Status: ACTIVE | Noted: 2023-05-25

## 2023-05-25 PROBLEM — E55.9 VITAMIN D DEFICIENCY: Status: ACTIVE | Noted: 2023-05-25

## 2023-05-26 ENCOUNTER — OFFICE VISIT (OUTPATIENT)
Dept: PRIMARY CARE | Facility: CLINIC | Age: 63
End: 2023-05-26
Payer: MEDICARE

## 2023-05-26 ENCOUNTER — SOCIAL WORK (OUTPATIENT)
Dept: PRIMARY CARE | Facility: CLINIC | Age: 63
End: 2023-05-26
Payer: MEDICARE

## 2023-05-26 VITALS
SYSTOLIC BLOOD PRESSURE: 142 MMHG | BODY MASS INDEX: 22.18 KG/M2 | WEIGHT: 138 LBS | RESPIRATION RATE: 18 BRPM | HEIGHT: 66 IN | OXYGEN SATURATION: 92 % | DIASTOLIC BLOOD PRESSURE: 68 MMHG | TEMPERATURE: 98.2 F | HEART RATE: 98 BPM

## 2023-05-26 DIAGNOSIS — I10 BENIGN ESSENTIAL HYPERTENSION: Primary | ICD-10-CM

## 2023-05-26 DIAGNOSIS — F33.9 DEPRESSION, RECURRENT (CMS-HCC): ICD-10-CM

## 2023-05-26 DIAGNOSIS — F41.1 GENERALIZED ANXIETY DISORDER: ICD-10-CM

## 2023-05-26 DIAGNOSIS — J45.20 MILD INTERMITTENT EXTRINSIC ASTHMA WITHOUT COMPLICATION (HHS-HCC): ICD-10-CM

## 2023-05-26 DIAGNOSIS — F41.1 GENERALIZED ANXIETY DISORDER: Primary | ICD-10-CM

## 2023-05-26 PROBLEM — H61.21 IMPACTED CERUMEN OF RIGHT EAR: Status: RESOLVED | Noted: 2023-05-25 | Resolved: 2023-05-26

## 2023-05-26 PROBLEM — N20.0 KIDNEY STONE: Status: RESOLVED | Noted: 2023-05-25 | Resolved: 2023-05-26

## 2023-05-26 PROBLEM — R31.9 HEMATURIA: Status: RESOLVED | Noted: 2023-05-25 | Resolved: 2023-05-26

## 2023-05-26 PROBLEM — J30.9 ALLERGIC RHINITIS: Status: RESOLVED | Noted: 2023-05-25 | Resolved: 2023-05-26

## 2023-05-26 PROBLEM — N63.20 LEFT BREAST MASS: Status: RESOLVED | Noted: 2023-05-25 | Resolved: 2023-05-26

## 2023-05-26 PROBLEM — R00.2 PALPITATIONS: Status: RESOLVED | Noted: 2023-02-27 | Resolved: 2023-05-26

## 2023-05-26 PROBLEM — H93.13 TINNITUS, SUBJECTIVE, BILATERAL: Status: RESOLVED | Noted: 2023-02-27 | Resolved: 2023-05-26

## 2023-05-26 PROBLEM — R31.0 GROSS HEMATURIA: Status: RESOLVED | Noted: 2023-05-25 | Resolved: 2023-05-26

## 2023-05-26 PROBLEM — L30.4 INTERTRIGO: Status: RESOLVED | Noted: 2023-05-25 | Resolved: 2023-05-26

## 2023-05-26 PROCEDURE — 1159F MED LIST DOCD IN RCRD: CPT | Performed by: FAMILY MEDICINE

## 2023-05-26 PROCEDURE — 1160F RVW MEDS BY RX/DR IN RCRD: CPT | Performed by: FAMILY MEDICINE

## 2023-05-26 PROCEDURE — 1036F TOBACCO NON-USER: CPT | Performed by: FAMILY MEDICINE

## 2023-05-26 PROCEDURE — 99214 OFFICE O/P EST MOD 30 MIN: CPT | Performed by: FAMILY MEDICINE

## 2023-05-26 PROCEDURE — 3078F DIAST BP <80 MM HG: CPT | Performed by: FAMILY MEDICINE

## 2023-05-26 PROCEDURE — 3077F SYST BP >= 140 MM HG: CPT | Performed by: FAMILY MEDICINE

## 2023-05-26 ASSESSMENT — PATIENT HEALTH QUESTIONNAIRE - PHQ9
3. TROUBLE FALLING OR STAYING ASLEEP: SEVERAL DAYS
SUM OF ALL RESPONSES TO PHQ9 QUESTIONS 1 & 2: 6
8. MOVING OR SPEAKING SO SLOWLY THAT OTHER PEOPLE COULD HAVE NOTICED. OR THE OPPOSITE, BEING SO FIGETY OR RESTLESS THAT YOU HAVE BEEN MOVING AROUND A LOT MORE THAN USUAL: NOT AT ALL
SUM OF ALL RESPONSES TO PHQ QUESTIONS 1-9: 15
6. FEELING BAD ABOUT YOURSELF - OR THAT YOU ARE A FAILURE OR HAVE LET YOURSELF OR YOUR FAMILY DOWN: MORE THAN HALF THE DAYS
10. IF YOU CHECKED OFF ANY PROBLEMS, HOW DIFFICULT HAVE THESE PROBLEMS MADE IT FOR YOU TO DO YOUR WORK, TAKE CARE OF THINGS AT HOME, OR GET ALONG WITH OTHER PEOPLE: SOMEWHAT DIFFICULT
9. THOUGHTS THAT YOU WOULD BE BETTER OFF DEAD, OR OF HURTING YOURSELF: NOT AT ALL
5. POOR APPETITE OR OVEREATING: NEARLY EVERY DAY
7. TROUBLE CONCENTRATING ON THINGS, SUCH AS READING THE NEWSPAPER OR WATCHING TELEVISION: NOT AT ALL
1. LITTLE INTEREST OR PLEASURE IN DOING THINGS: NEARLY EVERY DAY
2. FEELING DOWN, DEPRESSED OR HOPELESS: NEARLY EVERY DAY
4. FEELING TIRED OR HAVING LITTLE ENERGY: NEARLY EVERY DAY

## 2023-05-26 ASSESSMENT — ANXIETY QUESTIONNAIRES
IF YOU CHECKED OFF ANY PROBLEMS ON THIS QUESTIONNAIRE, HOW DIFFICULT HAVE THESE PROBLEMS MADE IT FOR YOU TO DO YOUR WORK, TAKE CARE OF THINGS AT HOME, OR GET ALONG WITH OTHER PEOPLE: SOMEWHAT DIFFICULT
3. WORRYING TOO MUCH ABOUT DIFFERENT THINGS: MORE THAN HALF THE DAYS
6. BECOMING EASILY ANNOYED OR IRRITABLE: SEVERAL DAYS
5. BEING SO RESTLESS THAT IT IS HARD TO SIT STILL: NOT AT ALL
GAD7 TOTAL SCORE: 9
4. TROUBLE RELAXING: SEVERAL DAYS
7. FEELING AFRAID AS IF SOMETHING AWFUL MIGHT HAPPEN: NOT AT ALL
2. NOT BEING ABLE TO STOP OR CONTROL WORRYING: MORE THAN HALF THE DAYS
1. FEELING NERVOUS, ANXIOUS, OR ON EDGE: NEARLY EVERY DAY

## 2023-05-26 NOTE — PROGRESS NOTES
"Collaborative Care (CoCM)  Progress Note    Type of Interaction: In Office    Start Time: 11:02 AM    End Time: 11:32 AM    Appointment: Scheduled    Reason for Visit:   Chief Complaint   Patient presents with    Follow-up    Depression    Anxiety        Interval History / Patient Symptoms:     Patient Health Questionnaire-9 Score: 15 (5/26/2023 11:10 AM)  DMITRI-7 Total Score: 9 (5/26/2023 11:46 AM)    Zoloft 25mg: 3 hrs. After taking, arms/legs got tingling  Only taking 1/2 a tab right now    Currently so many sx, not sure if depression, medication, hunger  Feeling nervous, shaky, no desire to eat, weight on shoulders  Overall weakness  Peddling on bike 10-20 minutes  Check with dr. Jordan re: proper Ensure type to drink    Interventions Provided: Psychoeducation and Motivational Interviewing      Progress Made: Minimum    Response to Intervention: Patient explored the word \"force\" vs. Motivate and how the meaning of a word can change the outlook on a situation.  Patient engaged in psychoeducation on anxiety, medication management, and the realistic expectations of when the medication will take effect.  Patient further explored her lack of desire to eat, creating a meal plan where her meals are already decided for her for a week as a way to reduce stress/thoughts of having to eat/plan to eat.      Plan:     Work on Meal plan- to assist with reducing anxiety around eating/meal prep.  Start 25mg. Of zoloft and try to push through the side effects that may arise.      Follow Up / Next Appointment: Next appointment: 06/01/23      "

## 2023-05-26 NOTE — ASSESSMENT & PLAN NOTE
Intermittent  CXR normal.   Continue as needed use of MDI  Consider daily controller if cough and mucus persist.   Consider PFT and pulm referral if not improving.  Recheck in 1 month

## 2023-05-26 NOTE — ASSESSMENT & PLAN NOTE
Uncontrolled  Suspect anxiety related  Reviewed all labs  Continue home BP monitoring   Recheck in 1 month

## 2023-05-26 NOTE — PROGRESS NOTES
Subjective   Patient ID: Reta Davis is a 63 y.o. female who presents for No chief complaint on file..    Here for follow up anxiety    Last visit started on sertraline 25 only taking 1/2 tab due to side effects  Numbness and tingling in her hands and legs  Strange feeling     Seeing counselor regular.   Worries that she is not eating enough  Weight slight decreased, no appetite  Taking MV and vit d  Drinking ensure and power aide daily  Tea in am and water bottle daily    Still cough with increased phlem off and on  Stopped singulair as concerned with anxiety         Review of Systems    Objective   There were no vitals taken for this visit.    Physical Exam  Vitals and nursing note reviewed.   Constitutional:       Appearance: Normal appearance.   Cardiovascular:      Rate and Rhythm: Normal rate and regular rhythm.   Pulmonary:      Effort: Pulmonary effort is normal.      Breath sounds: Normal breath sounds.   Musculoskeletal:      Cervical back: Normal range of motion.   Neurological:      Mental Status: She is alert.   Psychiatric:         Mood and Affect: Mood normal.         Behavior: Behavior normal.         Thought Content: Thought content normal.         Judgment: Judgment normal.         Assessment/Plan   Problem List Items Addressed This Visit          Respiratory    Extrinsic asthma without complication     Intermittent  CXR normal.   Continue as needed use of MDI  Consider daily controller if cough and mucus persist.   Consider PFT and pulm referral if not improving.  Recheck in 1 month            Circulatory    Benign essential hypertension - Primary     Uncontrolled  Suspect anxiety related  Reviewed all labs  Continue home BP monitoring   Recheck in 1 month              Other    Generalized anxiety disorder    Depression, recurrent (CMS/HCC)     Improving but not at goal  Case reviewed with  counselor  Increase sertraline to 25 mg daily  Use coping strategies as reviewed with counselor  Recheck  in 1 month

## 2023-05-26 NOTE — ASSESSMENT & PLAN NOTE
Improving but not at goal  Case reviewed with  counselor  Increase sertraline to 25 mg daily  Use coping strategies as reviewed with counselor  Recheck in 1 month

## 2023-06-01 ENCOUNTER — SOCIAL WORK (OUTPATIENT)
Dept: PRIMARY CARE | Facility: CLINIC | Age: 63
End: 2023-06-01
Payer: MEDICARE

## 2023-06-01 DIAGNOSIS — F41.1 GENERALIZED ANXIETY DISORDER: Primary | ICD-10-CM

## 2023-06-01 ASSESSMENT — ANXIETY QUESTIONNAIRES
7. FEELING AFRAID AS IF SOMETHING AWFUL MIGHT HAPPEN: NOT AT ALL
GAD7 TOTAL SCORE: 6
3. WORRYING TOO MUCH ABOUT DIFFERENT THINGS: SEVERAL DAYS
6. BECOMING EASILY ANNOYED OR IRRITABLE: NOT AT ALL
5. BEING SO RESTLESS THAT IT IS HARD TO SIT STILL: NOT AT ALL
1. FEELING NERVOUS, ANXIOUS, OR ON EDGE: MORE THAN HALF THE DAYS
2. NOT BEING ABLE TO STOP OR CONTROL WORRYING: MORE THAN HALF THE DAYS
IF YOU CHECKED OFF ANY PROBLEMS ON THIS QUESTIONNAIRE, HOW DIFFICULT HAVE THESE PROBLEMS MADE IT FOR YOU TO DO YOUR WORK, TAKE CARE OF THINGS AT HOME, OR GET ALONG WITH OTHER PEOPLE: SOMEWHAT DIFFICULT
4. TROUBLE RELAXING: SEVERAL DAYS

## 2023-06-01 ASSESSMENT — PATIENT HEALTH QUESTIONNAIRE - PHQ9
7. TROUBLE CONCENTRATING ON THINGS, SUCH AS READING THE NEWSPAPER OR WATCHING TELEVISION: NOT AT ALL
2. FEELING DOWN, DEPRESSED OR HOPELESS: SEVERAL DAYS
SUM OF ALL RESPONSES TO PHQ9 QUESTIONS 1 & 2: 2
9. THOUGHTS THAT YOU WOULD BE BETTER OFF DEAD, OR OF HURTING YOURSELF: NOT AT ALL
8. MOVING OR SPEAKING SO SLOWLY THAT OTHER PEOPLE COULD HAVE NOTICED. OR THE OPPOSITE, BEING SO FIGETY OR RESTLESS THAT YOU HAVE BEEN MOVING AROUND A LOT MORE THAN USUAL: NOT AT ALL
6. FEELING BAD ABOUT YOURSELF - OR THAT YOU ARE A FAILURE OR HAVE LET YOURSELF OR YOUR FAMILY DOWN: SEVERAL DAYS
5. POOR APPETITE OR OVEREATING: SEVERAL DAYS
3. TROUBLE FALLING OR STAYING ASLEEP: NOT AT ALL
4. FEELING TIRED OR HAVING LITTLE ENERGY: SEVERAL DAYS
10. IF YOU CHECKED OFF ANY PROBLEMS, HOW DIFFICULT HAVE THESE PROBLEMS MADE IT FOR YOU TO DO YOUR WORK, TAKE CARE OF THINGS AT HOME, OR GET ALONG WITH OTHER PEOPLE: SOMEWHAT DIFFICULT
1. LITTLE INTEREST OR PLEASURE IN DOING THINGS: SEVERAL DAYS
SUM OF ALL RESPONSES TO PHQ QUESTIONS 1-9: 5

## 2023-06-01 NOTE — PROGRESS NOTES
Collaborative Care (CoCM)  Progress Note    Type of Interaction: In Office    Start Time: 9:35 AM    End Time: 10:00 AM        Appointment: Scheduled    Reason for Visit:   Chief Complaint   Patient presents with    Follow-up    Depression          Interval History / Patient Symptoms:     Patient Health Questionnaire-9 Score: 5 (6/1/2023  1:34 PM)  DMITRI-7 Total Score: 6 (6/1/2023  1:34 PM)        Interventions Provided: Psychoeducation      Progress Made: N/A    Response to Intervention: Patient has been taking 25mg. Zoloft consistently since last appointment.  Patient describes feeling better and eating better; states that she doesn't have any tingling in her arms, but feels some weakness in her arms.  States that the weakness doesn't restrict her from doing any of her daily activities and she forgets about it at times during the day.  Psychoeducation used to help patient understand how the medication works, and how her body initially was responding to anxiety vs the difference she is seeing now- ex: feeling tightness and tense vs feeling more at ease and calm.    Plan:     Continue to take 25mg. Zoloft and document how often she feels tingling and continue to explore feelings.      Follow Up / Next Appointment: Next appointment: 06/16/23

## 2023-06-05 ENCOUNTER — TELEPHONE (OUTPATIENT)
Dept: PRIMARY CARE | Facility: CLINIC | Age: 63
End: 2023-06-05
Payer: MEDICARE

## 2023-06-05 NOTE — TELEPHONE ENCOUNTER
----- Message from Sara Rodriguez Pla, DO sent at 5/18/2023  4:57 PM EDT -----  Regarding: RE: Psych recommendation  Med sent to her local pharmacy, thanks   ----- Message -----  From: COLTON Washington  Sent: 5/18/2023   2:54 PM EDT  To: Sara Rodriguez Pla, DO  Subject: Psych recommendation                             Hi- I met with Reta today to go over Dr. Sanchez's psych recommendations. She is hesitant but willing to try the sertraline 25mg.  Can you send in a prescription?  She has a F/U appt. With you next Friday the 25th.  Thank you!

## 2023-06-06 ENCOUNTER — DOCUMENTATION (OUTPATIENT)
Dept: PRIMARY CARE | Facility: CLINIC | Age: 63
End: 2023-06-06
Payer: MEDICARE

## 2023-06-06 DIAGNOSIS — F41.1 GENERALIZED ANXIETY DISORDER: Primary | ICD-10-CM

## 2023-06-06 PROCEDURE — 99493 SBSQ PSYC COLLAB CARE MGMT: CPT | Performed by: FAMILY MEDICINE

## 2023-06-06 PROCEDURE — 99494 1ST/SBSQ PSYC COLLAB CARE: CPT | Performed by: FAMILY MEDICINE

## 2023-06-09 ENCOUNTER — TELEPHONE (OUTPATIENT)
Dept: PRIMARY CARE | Facility: CLINIC | Age: 63
End: 2023-06-09
Payer: MEDICARE

## 2023-06-09 NOTE — TELEPHONE ENCOUNTER
Pt states with Zoloft 25mg, its been hard for her tto get through the day and feeling heavily fatigued. She originally assume they were side affects of re starting but it been 2 weeks and she not sure if she should cut back. Please advise. thanks

## 2023-06-16 ENCOUNTER — SOCIAL WORK (OUTPATIENT)
Dept: PRIMARY CARE | Facility: CLINIC | Age: 63
End: 2023-06-16
Payer: MEDICARE

## 2023-06-16 DIAGNOSIS — F41.1 GENERALIZED ANXIETY DISORDER: Primary | ICD-10-CM

## 2023-06-16 ASSESSMENT — PATIENT HEALTH QUESTIONNAIRE - PHQ9
SUM OF ALL RESPONSES TO PHQ QUESTIONS 1-9: 3
SUM OF ALL RESPONSES TO PHQ9 QUESTIONS 1 & 2: 1
7. TROUBLE CONCENTRATING ON THINGS, SUCH AS READING THE NEWSPAPER OR WATCHING TELEVISION: NOT AT ALL
9. THOUGHTS THAT YOU WOULD BE BETTER OFF DEAD, OR OF HURTING YOURSELF: NOT AT ALL
5. POOR APPETITE OR OVEREATING: SEVERAL DAYS
8. MOVING OR SPEAKING SO SLOWLY THAT OTHER PEOPLE COULD HAVE NOTICED. OR THE OPPOSITE, BEING SO FIGETY OR RESTLESS THAT YOU HAVE BEEN MOVING AROUND A LOT MORE THAN USUAL: NOT AT ALL
1. LITTLE INTEREST OR PLEASURE IN DOING THINGS: SEVERAL DAYS
3. TROUBLE FALLING OR STAYING ASLEEP: NOT AT ALL
6. FEELING BAD ABOUT YOURSELF - OR THAT YOU ARE A FAILURE OR HAVE LET YOURSELF OR YOUR FAMILY DOWN: NOT AT ALL
4. FEELING TIRED OR HAVING LITTLE ENERGY: SEVERAL DAYS
10. IF YOU CHECKED OFF ANY PROBLEMS, HOW DIFFICULT HAVE THESE PROBLEMS MADE IT FOR YOU TO DO YOUR WORK, TAKE CARE OF THINGS AT HOME, OR GET ALONG WITH OTHER PEOPLE: NOT DIFFICULT AT ALL
2. FEELING DOWN, DEPRESSED OR HOPELESS: NOT AT ALL

## 2023-06-16 ASSESSMENT — ANXIETY QUESTIONNAIRES
3. WORRYING TOO MUCH ABOUT DIFFERENT THINGS: SEVERAL DAYS
7. FEELING AFRAID AS IF SOMETHING AWFUL MIGHT HAPPEN: NOT AT ALL
GAD7 TOTAL SCORE: 4
1. FEELING NERVOUS, ANXIOUS, OR ON EDGE: SEVERAL DAYS
4. TROUBLE RELAXING: SEVERAL DAYS
6. BECOMING EASILY ANNOYED OR IRRITABLE: SEVERAL DAYS
IF YOU CHECKED OFF ANY PROBLEMS ON THIS QUESTIONNAIRE, HOW DIFFICULT HAVE THESE PROBLEMS MADE IT FOR YOU TO DO YOUR WORK, TAKE CARE OF THINGS AT HOME, OR GET ALONG WITH OTHER PEOPLE: NOT DIFFICULT AT ALL
5. BEING SO RESTLESS THAT IT IS HARD TO SIT STILL: NOT AT ALL
2. NOT BEING ABLE TO STOP OR CONTROL WORRYING: NOT AT ALL

## 2023-06-16 NOTE — PROGRESS NOTES
Collaborative Care (CoCM)  Progress Note    Type of Interaction: In Office    Start Time: 10:58 AM    End Time: 11:52 PM        Appointment: Scheduled    Reason for Visit:   Chief Complaint   Patient presents with    Follow-up    Anxiety    Depression        Interval History / Patient Symptoms:     Patient Health Questionnaire-9 Score: 3 (6/16/2023 11:00 AM)  DMITRI-7 Total Score: 4 (6/16/2023 11:00 AM)      Not feeling as anxious any longer-  Has been jotting down good and bad days- M-W were bad days- heaviness in arms- late morning to late afternoon  Feels weakness more days than not    Feels better on the medication & sees slight change    Interventions Provided: Motivational Interviewing and Develop Coping Strategies      Progress Made: Minimum    Response to Intervention: Discussed patient's current functioning, utilized Motivational Interviewing to help patient identify how to take control of her anxiety, and identified different coping strategies. Spent time discussing medication and side effects and recommended patient follow up with PCP at next appointment regarding symptoms.  Patient does not want to increase meds at this time.        Plan:     Discuss side effect with PCP, continue to journal feelings and sx experienced.      Follow Up / Next Appointment: Next appointment: 06/30/23

## 2023-06-23 ENCOUNTER — TELEMEDICINE (OUTPATIENT)
Dept: PRIMARY CARE | Facility: CLINIC | Age: 63
End: 2023-06-23
Payer: MEDICARE

## 2023-06-23 ENCOUNTER — TELEPHONE (OUTPATIENT)
Dept: PRIMARY CARE | Facility: CLINIC | Age: 63
End: 2023-06-23

## 2023-06-23 DIAGNOSIS — I10 BENIGN ESSENTIAL HYPERTENSION: ICD-10-CM

## 2023-06-23 DIAGNOSIS — F33.9 DEPRESSION, RECURRENT (CMS-HCC): ICD-10-CM

## 2023-06-23 DIAGNOSIS — J45.20 MILD INTERMITTENT EXTRINSIC ASTHMA WITHOUT COMPLICATION (HHS-HCC): Primary | ICD-10-CM

## 2023-06-23 DIAGNOSIS — F41.1 GENERALIZED ANXIETY DISORDER: ICD-10-CM

## 2023-06-23 PROCEDURE — 99213 OFFICE O/P EST LOW 20 MIN: CPT | Performed by: FAMILY MEDICINE

## 2023-06-23 RX ORDER — SERTRALINE HYDROCHLORIDE 50 MG/1
50 TABLET, FILM COATED ORAL DAILY
Qty: 30 TABLET | Refills: 1 | Status: SHIPPED | OUTPATIENT
Start: 2023-06-23 | End: 2023-07-26 | Stop reason: SDUPTHER

## 2023-06-23 NOTE — ASSESSMENT & PLAN NOTE
Uncontrolled  Reviewed DMITRI-7.  Hoping increase in sertraline will help.  Consider as needed antianxiety at next visit if symptoms persist

## 2023-06-23 NOTE — ASSESSMENT & PLAN NOTE
Stable with flares when allergens are high.  Continue medications as needed.  Return if symptoms worsen

## 2023-06-23 NOTE — ASSESSMENT & PLAN NOTE
Slight improvement but not at goal.  Reviewed PHQ-9.  Increasing sertraline to 50 mg daily.  Use and side effect profile reviewed.  Follow-up as scheduled with psychotherapy.  Recheck in 1 month

## 2023-06-23 NOTE — PROGRESS NOTES
Subjective   Patient ID: Reta Davis is a 63 y.o. female who presents for No chief complaint on file..    [place] virtual visit for recheck anxiety, asthma and blood pressure.    Still not feeling up to par on medications.  Has been taking 25 mg of sertraline in the morning over the past few days at the counselor recommendation.  Admits to feeling heavy arms and legs feel weighted.  Feels like she has a buildup of energy that she needs to release and has to get up and moved to get rid of.    Reviewed PHQ-9 and DMITRI-7.  PHQ-9 with a score of 9 and DMITRI-7 with a score of 7.  Admitting to feeling tired with little energy poor eating.  Not feeling as down or depressed and hopeless as in the past.  Still some trouble falling asleep and staying asleep.    Feeling anxious still worrying with trouble relaxing.  Has follow-up with counselor next Friday.    Breathing issues come and go depending on allergens.  Had been doing much better and this past week feeling shortness of breath.    Home blood pressure checks are in the 130s over 70s although does admit to not checking as regularly as she should.                 Review of Systems    Objective   There were no vitals taken for this visit.    Physical Exam  Constitutional:       Appearance: Normal appearance.   Neurological:      Mental Status: She is alert.   Psychiatric:         Mood and Affect: Mood is anxious and depressed.         Speech: Speech normal.         Behavior: Behavior normal.         Thought Content: Thought content normal.         Judgment: Judgment normal.         Assessment/Plan   Problem List Items Addressed This Visit       Extrinsic asthma without complication - Primary     Stable with flares when allergens are high.  Continue medications as needed.  Return if symptoms worsen         Benign essential hypertension    Generalized anxiety disorder     Uncontrolled  Reviewed DMITRI-7.  Hoping increase in sertraline will help.  Consider as needed antianxiety at  next visit if symptoms persist         Depression, recurrent (CMS/HCC)     Slight improvement but not at goal.  Reviewed PHQ-9.  Increasing sertraline to 50 mg daily.  Use and side effect profile reviewed.  Follow-up as scheduled with psychotherapy.  Recheck in 1 month

## 2023-06-30 ENCOUNTER — APPOINTMENT (OUTPATIENT)
Dept: PRIMARY CARE | Facility: CLINIC | Age: 63
End: 2023-06-30
Payer: MEDICARE

## 2023-07-03 ENCOUNTER — DOCUMENTATION (OUTPATIENT)
Dept: PRIMARY CARE | Facility: CLINIC | Age: 63
End: 2023-07-03
Payer: MEDICARE

## 2023-07-03 DIAGNOSIS — F41.1 GENERALIZED ANXIETY DISORDER: Primary | ICD-10-CM

## 2023-07-03 PROCEDURE — 99494 1ST/SBSQ PSYC COLLAB CARE: CPT | Performed by: FAMILY MEDICINE

## 2023-07-03 PROCEDURE — 99493 SBSQ PSYC COLLAB CARE MGMT: CPT | Performed by: FAMILY MEDICINE

## 2023-07-07 ENCOUNTER — SOCIAL WORK (OUTPATIENT)
Dept: PRIMARY CARE | Facility: CLINIC | Age: 63
End: 2023-07-07
Payer: MEDICARE

## 2023-07-07 DIAGNOSIS — F41.1 GENERALIZED ANXIETY DISORDER: Primary | ICD-10-CM

## 2023-07-07 ASSESSMENT — PATIENT HEALTH QUESTIONNAIRE - PHQ9
6. FEELING BAD ABOUT YOURSELF - OR THAT YOU ARE A FAILURE OR HAVE LET YOURSELF OR YOUR FAMILY DOWN: NOT AT ALL
2. FEELING DOWN, DEPRESSED OR HOPELESS: NOT AT ALL
5. POOR APPETITE OR OVEREATING: NOT AT ALL
3. TROUBLE FALLING OR STAYING ASLEEP: NOT AT ALL
4. FEELING TIRED OR HAVING LITTLE ENERGY: NOT AT ALL
9. THOUGHTS THAT YOU WOULD BE BETTER OFF DEAD, OR OF HURTING YOURSELF: NOT AT ALL
7. TROUBLE CONCENTRATING ON THINGS, SUCH AS READING THE NEWSPAPER OR WATCHING TELEVISION: NOT AT ALL
SUM OF ALL RESPONSES TO PHQ QUESTIONS 1-9: 0
SUM OF ALL RESPONSES TO PHQ9 QUESTIONS 1 & 2: 0
1. LITTLE INTEREST OR PLEASURE IN DOING THINGS: NOT AT ALL
8. MOVING OR SPEAKING SO SLOWLY THAT OTHER PEOPLE COULD HAVE NOTICED. OR THE OPPOSITE, BEING SO FIGETY OR RESTLESS THAT YOU HAVE BEEN MOVING AROUND A LOT MORE THAN USUAL: NOT AT ALL
10. IF YOU CHECKED OFF ANY PROBLEMS, HOW DIFFICULT HAVE THESE PROBLEMS MADE IT FOR YOU TO DO YOUR WORK, TAKE CARE OF THINGS AT HOME, OR GET ALONG WITH OTHER PEOPLE: NOT DIFFICULT AT ALL

## 2023-07-07 ASSESSMENT — ANXIETY QUESTIONNAIRES
4. TROUBLE RELAXING: NOT AT ALL
7. FEELING AFRAID AS IF SOMETHING AWFUL MIGHT HAPPEN: NOT AT ALL
6. BECOMING EASILY ANNOYED OR IRRITABLE: NOT AT ALL
1. FEELING NERVOUS, ANXIOUS, OR ON EDGE: NOT AT ALL
3. WORRYING TOO MUCH ABOUT DIFFERENT THINGS: NOT AT ALL
5. BEING SO RESTLESS THAT IT IS HARD TO SIT STILL: NOT AT ALL
IF YOU CHECKED OFF ANY PROBLEMS ON THIS QUESTIONNAIRE, HOW DIFFICULT HAVE THESE PROBLEMS MADE IT FOR YOU TO DO YOUR WORK, TAKE CARE OF THINGS AT HOME, OR GET ALONG WITH OTHER PEOPLE: NOT DIFFICULT AT ALL
GAD7 TOTAL SCORE: 0
2. NOT BEING ABLE TO STOP OR CONTROL WORRYING: NOT AT ALL

## 2023-07-07 NOTE — PROGRESS NOTES
"Collaborative Care (CoCM)  Progress Note    Type of Interaction: In Office    Start Time: 2:03 PM    End Time: 2:50 PM    Appointment: Scheduled    Reason for Visit:   Chief Complaint   Patient presents with    Follow-up    Anxiety    Depression        Interval History / Patient Symptoms:     Patient Health Questionnaire-9 Score: 0 (7/7/2023  2:00 PM)  DMITRI-7 Total Score: 0 (7/7/2023  2:00 PM)      Patient reports she has been on 50mg sertraline for 2 weeks  Reports that 1 week ago Friday- momentarily felt like something was there- weakness/heaviness and coldness on chest- happened when organizing the weekend =  correlates with anxiety of getting ready for guests and the stress involved      Interventions Provided: Acceptance & Commitment Therapy      Progress Made: Moderate    Response to Intervention: Explored stress reactions with patient and how patient has been able to cope.  Identifies some stress of coordinating  Visits for .  With less stress, patient reports appetite good/eating normal; sleeping better, Shopping/going out, in the yard  Patient reports she has been reflecting on how things are changing and accepting where she's at; perspective    - noticed a positive difference \"I'm pretty obvious when I'm miserable\"    Plan:     Follow up appointment in 2 weeks      Follow Up / Next Appointment: Next appointment: 07/28/23      "

## 2023-07-14 ENCOUNTER — OFFICE VISIT (OUTPATIENT)
Dept: PRIMARY CARE | Facility: CLINIC | Age: 63
End: 2023-07-14
Payer: MEDICARE

## 2023-07-14 VITALS
RESPIRATION RATE: 16 BRPM | DIASTOLIC BLOOD PRESSURE: 68 MMHG | TEMPERATURE: 98 F | BODY MASS INDEX: 22.99 KG/M2 | SYSTOLIC BLOOD PRESSURE: 124 MMHG | HEART RATE: 97 BPM | WEIGHT: 138 LBS | OXYGEN SATURATION: 96 % | HEIGHT: 65 IN

## 2023-07-14 DIAGNOSIS — J45.20 MILD INTERMITTENT EXTRINSIC ASTHMA WITHOUT COMPLICATION (HHS-HCC): ICD-10-CM

## 2023-07-14 DIAGNOSIS — I10 BENIGN ESSENTIAL HYPERTENSION: ICD-10-CM

## 2023-07-14 DIAGNOSIS — Z78.0 MENOPAUSE: ICD-10-CM

## 2023-07-14 DIAGNOSIS — J47.9 BRONCHIECTASIS, UNCOMPLICATED (MULTI): ICD-10-CM

## 2023-07-14 DIAGNOSIS — Z00.00 MEDICARE WELCOME EXAM: Primary | ICD-10-CM

## 2023-07-14 DIAGNOSIS — F33.9 DEPRESSION, RECURRENT (CMS-HCC): ICD-10-CM

## 2023-07-14 DIAGNOSIS — E78.5 HYPERLIPIDEMIA, MILD: ICD-10-CM

## 2023-07-14 DIAGNOSIS — C18.1 PRIMARY CANCER OF APPENDIX (MULTI): ICD-10-CM

## 2023-07-14 DIAGNOSIS — Z12.31 ENCOUNTER FOR SCREENING MAMMOGRAM FOR MALIGNANT NEOPLASM OF BREAST: ICD-10-CM

## 2023-07-14 DIAGNOSIS — F41.1 GENERALIZED ANXIETY DISORDER: ICD-10-CM

## 2023-07-14 PROCEDURE — 1159F MED LIST DOCD IN RCRD: CPT | Performed by: FAMILY MEDICINE

## 2023-07-14 PROCEDURE — 1125F AMNT PAIN NOTED PAIN PRSNT: CPT | Performed by: FAMILY MEDICINE

## 2023-07-14 PROCEDURE — 3078F DIAST BP <80 MM HG: CPT | Performed by: FAMILY MEDICINE

## 2023-07-14 PROCEDURE — 99213 OFFICE O/P EST LOW 20 MIN: CPT | Performed by: FAMILY MEDICINE

## 2023-07-14 PROCEDURE — 1160F RVW MEDS BY RX/DR IN RCRD: CPT | Performed by: FAMILY MEDICINE

## 2023-07-14 PROCEDURE — 1036F TOBACCO NON-USER: CPT | Performed by: FAMILY MEDICINE

## 2023-07-14 PROCEDURE — 3074F SYST BP LT 130 MM HG: CPT | Performed by: FAMILY MEDICINE

## 2023-07-14 PROCEDURE — 1170F FXNL STATUS ASSESSED: CPT | Performed by: FAMILY MEDICINE

## 2023-07-14 PROCEDURE — G0402 INITIAL PREVENTIVE EXAM: HCPCS | Performed by: FAMILY MEDICINE

## 2023-07-14 ASSESSMENT — PATIENT HEALTH QUESTIONNAIRE - PHQ9
SUM OF ALL RESPONSES TO PHQ9 QUESTIONS 1 AND 2: 0
1. LITTLE INTEREST OR PLEASURE IN DOING THINGS: NOT AT ALL
2. FEELING DOWN, DEPRESSED OR HOPELESS: NOT AT ALL

## 2023-07-14 ASSESSMENT — VISUAL ACUITY
OD_CC: 20/20
OS_CC: 20/15

## 2023-07-14 ASSESSMENT — ACTIVITIES OF DAILY LIVING (ADL)
DOING_HOUSEWORK: INDEPENDENT
TAKING_MEDICATION: INDEPENDENT
MANAGING_FINANCES: INDEPENDENT
DRESSING: INDEPENDENT
BATHING: INDEPENDENT
GROCERY_SHOPPING: INDEPENDENT

## 2023-07-14 NOTE — ASSESSMENT & PLAN NOTE
>>ASSESSMENT AND PLAN FOR EXTRINSIC ASTHMA WITHOUT COMPLICATION ([facility]) WRITTEN ON 7/14/2023  2:54 PM BY STEPHAN HIRSCH PLA, DO    Stable  Continue present MDI.  Continue to monitor    >>ASSESSMENT AND PLAN FOR BRONCHIECTASIS, UNCOMPLICATED (MULTI) WRITTEN ON 7/14/2023  2:54 PM BY STEPHAN HIRSCH PLA, DO    Stable  Continue care per pulmonology

## 2023-07-14 NOTE — ASSESSMENT & PLAN NOTE
Exam completed.  Eye exam normal.  Recent EKG normal.  Declines all immunizations.  Schedule bone density.  Mammogram and colonoscopy up-to-date.

## 2023-07-14 NOTE — LETTER
Reta SCHAFFER Davonmasonteresa  1960    7/14/2023    To Whom This May Concern:    My patient, Reta Davis, is unable to perform Jury Duty due to a mental or physical condition that renders the prospective juror unfit for jury service for the remainder of the jury year.    Should you have any questions please do not hesitate to call.    Thank you for your cooperation.    Sincerely,    Sara Rodriguez Pla, DO

## 2023-07-14 NOTE — ASSESSMENT & PLAN NOTE
Improving but not at goal.  Continue psychotherapy.  Consider increasing sertraline to 75 mg if symptoms persist after next psychotherapy visit.  Recheck in 3 months or sooner if needed

## 2023-07-14 NOTE — PROGRESS NOTES
"Subjective   Reason for Visit: Reta Davis is an 63 y.o. female here for a Medicare Wellness visit.     Past Medical, Surgical, and Family History reviewed and updated in chart.    Reviewed all medications by prescribing practitioner or clinical pharmacist (such as prescriptions, OTCs, herbal therapies and supplements) and documented in the medical record.    Here for med check and welcome to medicare exam    Taking meds daily for anxiety  Hx of HTN and hyperlipids    Pap 11/22  Mammogram 8/22  Colonoscopy 10/20 recheck in 3  Dexa   Coronary CT 10/21  \"0\"    Last labs all stable  Thyroid normal  Cholesterol improved.     Last visit increased the sertraline to 50 mg daily  Has been at the increased dose for the past 3 weeks.  Had been doing really well until jury duty notice.  Getting out, going shopping, eating well.   Seeing the counselor and going well.  Has a follow up in 2 weeks      Received her jury notice last night and all the anxiety came back  Has been a juror on a case and had a hard time focusing.     Walking more with the nicer weather  Diet is good  Sleeping better    Medicare Wellness Billing Compliance Satisfied    *This is a visual tool to show completion of required items on the day of the visit. Green checks will only appear on the date of visit.    Review all medications by prescribing practitioner or clinical pharmacist (such as prescriptions, OTCs, herbal therapies and supplements) documented in the medical record    Past Medical, Surgical, and Family History reviewed and updated in chart    Tobacco Use Reviewed    Alcohol Use Reviewed    Illicit Drug Use Reviewed    PHQ2/9    Falls in Last Year Reviewed    Home Safety Risk Factors Reviewed    Cognitive Impairment Reviewed    Patient Self Assessment and Health Status    Current Diet Reviewed    Exercise Frequency    ADL - Hearing Impairment    ADL - Bathing    ADL - Dressing    ADL - Walks in Home    IADL - Managing Finances  " "  IADL - Grocery Shopping    IADL - Taking Medications    IADL - Doing Housework    Vision Screening      Patient Care Team:  Sara Rodriguez Pla, DO as PCP - General  Sara Rodriguez Pla, DO [facility] PCP [facility]     Review of Systems    Objective   Vitals:  /78   Pulse 97   Temp 36.7 °C (98 °F)   Resp 16   Ht 1.657 m (5' 5.25\")   Wt 62.6 kg (138 lb)   SpO2 96%   BMI 22.79 kg/m²       Physical Exam  Vitals and nursing note reviewed.   Constitutional:       Appearance: Normal appearance.   Cardiovascular:      Rate and Rhythm: Normal rate and regular rhythm.   Pulmonary:      Effort: Pulmonary effort is normal.      Breath sounds: Normal breath sounds.   Musculoskeletal:      Cervical back: Normal range of motion.   Neurological:      Mental Status: She is alert.   Psychiatric:         Mood and Affect: Mood normal.         Behavior: Behavior normal.         Thought Content: Thought content normal.         Judgment: Judgment normal.       Assessment/Plan   Problem List Items Addressed This Visit       Extrinsic asthma without complication     Stable  Continue present MDI.  Continue to monitor         Benign essential hypertension     Stable  Reviewed previous labs.  Refilling meds at same dose.  Continue diet and exercise.  Recheck in 3 months         Hyperlipidemia, mild     Reviewed labs.  Reviewed diet.  Improving numbers but not at goal.  Recheck in 3 months         Primary cancer of appendix (CMS/HCC)     Stable  Continue care per oncology         Generalized anxiety disorder     Improving but not at goal.  Continue present meds and counseling.  Recheck in 3 months         Depression, recurrent (CMS/HCC)     Improving but not at goal.  Continue psychotherapy.  Consider increasing sertraline to 75 mg if symptoms persist after next psychotherapy visit.  Recheck in 3 months or sooner if needed         Medicare welcome exam - Primary     Exam completed.  Eye exam normal.  Recent " EKG normal.  Declines all immunizations.  Schedule bone density.  Mammogram and colonoscopy up-to-date.           Relevant Orders    Hepatitis C Antibody    Bronchiectasis, uncomplicated (CMS/HCC)     Stable  Continue care per pulmonology          Other Visit Diagnoses       Menopause        Relevant Orders    XR DEXA bone density    Encounter for screening mammogram for malignant neoplasm of breast        Relevant Orders    BI mammo bilateral screening tomosynthesis

## 2023-07-14 NOTE — ASSESSMENT & PLAN NOTE
Stable  Reviewed previous labs.  Refilling meds at same dose.  Continue diet and exercise.  Recheck in 3 months

## 2023-07-26 DIAGNOSIS — F33.9 DEPRESSION, RECURRENT (CMS-HCC): ICD-10-CM

## 2023-07-26 NOTE — TELEPHONE ENCOUNTER
Pt states still feels tired, weak, no appetite and just down Would like to know if she could increase her sertraline to 75mg. States been on 50mg for 1 mth and not perked up yet.

## 2023-07-27 RX ORDER — SERTRALINE HYDROCHLORIDE 50 MG/1
50 TABLET, FILM COATED ORAL DAILY
Qty: 30 TABLET | Refills: 0 | Status: SHIPPED | OUTPATIENT
Start: 2023-07-27 | End: 2023-08-07 | Stop reason: SDUPTHER

## 2023-07-28 ENCOUNTER — SOCIAL WORK (OUTPATIENT)
Dept: PRIMARY CARE | Facility: CLINIC | Age: 63
End: 2023-07-28
Payer: MEDICARE

## 2023-07-28 DIAGNOSIS — F41.1 GENERALIZED ANXIETY DISORDER: Primary | ICD-10-CM

## 2023-07-28 ASSESSMENT — ANXIETY QUESTIONNAIRES
5. BEING SO RESTLESS THAT IT IS HARD TO SIT STILL: NOT AT ALL
6. BECOMING EASILY ANNOYED OR IRRITABLE: NOT AT ALL
7. FEELING AFRAID AS IF SOMETHING AWFUL MIGHT HAPPEN: NOT AT ALL
4. TROUBLE RELAXING: MORE THAN HALF THE DAYS
3. WORRYING TOO MUCH ABOUT DIFFERENT THINGS: MORE THAN HALF THE DAYS
GAD7 TOTAL SCORE: 8
1. FEELING NERVOUS, ANXIOUS, OR ON EDGE: NEARLY EVERY DAY
IF YOU CHECKED OFF ANY PROBLEMS ON THIS QUESTIONNAIRE, HOW DIFFICULT HAVE THESE PROBLEMS MADE IT FOR YOU TO DO YOUR WORK, TAKE CARE OF THINGS AT HOME, OR GET ALONG WITH OTHER PEOPLE: SOMEWHAT DIFFICULT
2. NOT BEING ABLE TO STOP OR CONTROL WORRYING: SEVERAL DAYS

## 2023-07-28 ASSESSMENT — PATIENT HEALTH QUESTIONNAIRE - PHQ9
3. TROUBLE FALLING OR STAYING ASLEEP: NOT AT ALL
2. FEELING DOWN, DEPRESSED OR HOPELESS: NEARLY EVERY DAY
6. FEELING BAD ABOUT YOURSELF - OR THAT YOU ARE A FAILURE OR HAVE LET YOURSELF OR YOUR FAMILY DOWN: SEVERAL DAYS
10. IF YOU CHECKED OFF ANY PROBLEMS, HOW DIFFICULT HAVE THESE PROBLEMS MADE IT FOR YOU TO DO YOUR WORK, TAKE CARE OF THINGS AT HOME, OR GET ALONG WITH OTHER PEOPLE: SOMEWHAT DIFFICULT
9. THOUGHTS THAT YOU WOULD BE BETTER OFF DEAD, OR OF HURTING YOURSELF: NOT AT ALL
5. POOR APPETITE OR OVEREATING: NEARLY EVERY DAY
4. FEELING TIRED OR HAVING LITTLE ENERGY: NEARLY EVERY DAY
SUM OF ALL RESPONSES TO PHQ QUESTIONS 1-9: 13
1. LITTLE INTEREST OR PLEASURE IN DOING THINGS: NEARLY EVERY DAY
7. TROUBLE CONCENTRATING ON THINGS, SUCH AS READING THE NEWSPAPER OR WATCHING TELEVISION: NOT AT ALL
SUM OF ALL RESPONSES TO PHQ9 QUESTIONS 1 & 2: 6
8. MOVING OR SPEAKING SO SLOWLY THAT OTHER PEOPLE COULD HAVE NOTICED. OR THE OPPOSITE, BEING SO FIGETY OR RESTLESS THAT YOU HAVE BEEN MOVING AROUND A LOT MORE THAN USUAL: NOT AT ALL

## 2023-08-01 ENCOUNTER — DOCUMENTATION (OUTPATIENT)
Dept: PRIMARY CARE | Facility: CLINIC | Age: 63
End: 2023-08-01
Payer: MEDICARE

## 2023-08-01 DIAGNOSIS — F41.1 GENERALIZED ANXIETY DISORDER: Primary | ICD-10-CM

## 2023-08-01 PROCEDURE — 99493 SBSQ PSYC COLLAB CARE MGMT: CPT | Performed by: FAMILY MEDICINE

## 2023-08-01 PROCEDURE — 99494 1ST/SBSQ PSYC COLLAB CARE: CPT | Performed by: FAMILY MEDICINE

## 2023-08-04 ENCOUNTER — LAB (OUTPATIENT)
Dept: LAB | Facility: LAB | Age: 63
End: 2023-08-04
Payer: MEDICARE

## 2023-08-04 DIAGNOSIS — Z00.00 MEDICARE WELCOME EXAM: ICD-10-CM

## 2023-08-04 LAB — HEPATITIS C VIRUS AB PRESENCE IN SERUM: NONREACTIVE

## 2023-08-04 PROCEDURE — 36415 COLL VENOUS BLD VENIPUNCTURE: CPT

## 2023-08-04 PROCEDURE — 86803 HEPATITIS C AB TEST: CPT

## 2023-08-07 ENCOUNTER — TELEPHONE (OUTPATIENT)
Dept: PRIMARY CARE | Facility: CLINIC | Age: 63
End: 2023-08-07
Payer: MEDICARE

## 2023-08-07 DIAGNOSIS — F33.9 DEPRESSION, RECURRENT (CMS-HCC): ICD-10-CM

## 2023-08-07 RX ORDER — SERTRALINE HYDROCHLORIDE 50 MG/1
100 TABLET, FILM COATED ORAL DAILY
Qty: 60 TABLET | Refills: 3 | Status: SHIPPED | OUTPATIENT
Start: 2023-08-07 | End: 2023-10-16 | Stop reason: SDUPTHER

## 2023-08-07 NOTE — TELEPHONE ENCOUNTER
PATIENT STATES YOU GUPAULINA LAST DISCUSSED HER TAKING 250 MG DAILY BECAUSE ONE WAS NOT WORKING FOR HER. THE PHARM NEEDS A NEW SCRIPT REFLECTING THAT.

## 2023-08-14 ENCOUNTER — SOCIAL WORK (OUTPATIENT)
Dept: PRIMARY CARE | Facility: CLINIC | Age: 63
End: 2023-08-14
Payer: MEDICARE

## 2023-08-14 DIAGNOSIS — F41.1 GENERALIZED ANXIETY DISORDER: Primary | ICD-10-CM

## 2023-08-14 ASSESSMENT — ANXIETY QUESTIONNAIRES
1. FEELING NERVOUS, ANXIOUS, OR ON EDGE: NOT AT ALL
6. BECOMING EASILY ANNOYED OR IRRITABLE: NOT AT ALL
5. BEING SO RESTLESS THAT IT IS HARD TO SIT STILL: NOT AT ALL
2. NOT BEING ABLE TO STOP OR CONTROL WORRYING: NOT AT ALL
7. FEELING AFRAID AS IF SOMETHING AWFUL MIGHT HAPPEN: NOT AT ALL
GAD7 TOTAL SCORE: 1
3. WORRYING TOO MUCH ABOUT DIFFERENT THINGS: SEVERAL DAYS
4. TROUBLE RELAXING: NOT AT ALL
IF YOU CHECKED OFF ANY PROBLEMS ON THIS QUESTIONNAIRE, HOW DIFFICULT HAVE THESE PROBLEMS MADE IT FOR YOU TO DO YOUR WORK, TAKE CARE OF THINGS AT HOME, OR GET ALONG WITH OTHER PEOPLE: NOT DIFFICULT AT ALL

## 2023-08-14 ASSESSMENT — PATIENT HEALTH QUESTIONNAIRE - PHQ9
8. MOVING OR SPEAKING SO SLOWLY THAT OTHER PEOPLE COULD HAVE NOTICED. OR THE OPPOSITE, BEING SO FIGETY OR RESTLESS THAT YOU HAVE BEEN MOVING AROUND A LOT MORE THAN USUAL: NOT AT ALL
6. FEELING BAD ABOUT YOURSELF - OR THAT YOU ARE A FAILURE OR HAVE LET YOURSELF OR YOUR FAMILY DOWN: NOT AT ALL
SUM OF ALL RESPONSES TO PHQ9 QUESTIONS 1 & 2: 0
3. TROUBLE FALLING OR STAYING ASLEEP: NOT AT ALL
SUM OF ALL RESPONSES TO PHQ QUESTIONS 1-9: 1
10. IF YOU CHECKED OFF ANY PROBLEMS, HOW DIFFICULT HAVE THESE PROBLEMS MADE IT FOR YOU TO DO YOUR WORK, TAKE CARE OF THINGS AT HOME, OR GET ALONG WITH OTHER PEOPLE: NOT DIFFICULT AT ALL
7. TROUBLE CONCENTRATING ON THINGS, SUCH AS READING THE NEWSPAPER OR WATCHING TELEVISION: NOT AT ALL
4. FEELING TIRED OR HAVING LITTLE ENERGY: SEVERAL DAYS
2. FEELING DOWN, DEPRESSED OR HOPELESS: NOT AT ALL
5. POOR APPETITE OR OVEREATING: NOT AT ALL
9. THOUGHTS THAT YOU WOULD BE BETTER OFF DEAD, OR OF HURTING YOURSELF: NOT AT ALL
1. LITTLE INTEREST OR PLEASURE IN DOING THINGS: NOT AT ALL

## 2023-08-14 NOTE — PROGRESS NOTES
"  Collaborative Care (CoCM)  Progress Note    Type of Interaction: In Office    Start Time: 12:55 PM    End Time: 1:55 PM        Appointment: Scheduled    Reason for Visit:   Chief Complaint   Patient presents with    Follow-up      Interval History / Patient Symptoms:     Patient Health Questionnaire-9 Score: 1 (8/14/2023  3:00 PM)  DMITRI-7 Total Score: 1 (8/14/2023  3:00 PM)  Significant decrease in scores; patient reports that the medication has been helping-  Since last visit, started 100mg Sertraline; going well- feels good, eating good, regular schedule, makes sure eating lunch, meals- sticking to a routine, getting up in the morning, not laying in bed til later, exercising    Interventions Provided: Motivational Interviewing      Progress Made: Moderate    Response to Intervention: Patient explored how the past few weeks have gone since starting increased medication.  Patient describes feeling really good, though some worry about going backwards again.  Discussed ways that she can manage this, and that it is natural for mood to ebb and flow based on situations.  Went over homework- mild and severe anxieties and reactions from 1.1 of The Anxiety and Worry Workbook as well as what patient can do to assist with worries in certain domains of her life.  Patient expressed feeling that she can set a goal to have family over for Labor Day, but the other areas are a little more overwhelming right now to try to manage.    Discussed Module 4 of \"What, Me Worry?\" And completing mundane task attention activities.  Went over how to do the attention activities, the importance of the activities and how to document the activities on a chart.  Also provided patient a worksheet to use with previous week's homework, \"Decisional Balance Exercise\" to address costs and benefits of choices made.    Plan:     Complete Module 4 mundane task attention activities and bring to next appointment to review.      Follow Up / Next Appointment: " Next appointment: 08/25/23

## 2023-08-25 ENCOUNTER — OFFICE VISIT (OUTPATIENT)
Dept: PRIMARY CARE | Facility: CLINIC | Age: 63
End: 2023-08-25
Payer: MEDICARE

## 2023-08-25 ENCOUNTER — SOCIAL WORK (OUTPATIENT)
Dept: PRIMARY CARE | Facility: CLINIC | Age: 63
End: 2023-08-25
Payer: MEDICARE

## 2023-08-25 VITALS
WEIGHT: 135 LBS | DIASTOLIC BLOOD PRESSURE: 64 MMHG | HEART RATE: 88 BPM | RESPIRATION RATE: 16 BRPM | TEMPERATURE: 98.2 F | SYSTOLIC BLOOD PRESSURE: 118 MMHG | OXYGEN SATURATION: 96 % | BODY MASS INDEX: 22.29 KG/M2

## 2023-08-25 DIAGNOSIS — F33.9 DEPRESSION, RECURRENT (CMS-HCC): Primary | ICD-10-CM

## 2023-08-25 DIAGNOSIS — F41.1 GENERALIZED ANXIETY DISORDER: Primary | ICD-10-CM

## 2023-08-25 PROCEDURE — 1160F RVW MEDS BY RX/DR IN RCRD: CPT | Performed by: FAMILY MEDICINE

## 2023-08-25 PROCEDURE — 1125F AMNT PAIN NOTED PAIN PRSNT: CPT | Performed by: FAMILY MEDICINE

## 2023-08-25 PROCEDURE — 3074F SYST BP LT 130 MM HG: CPT | Performed by: FAMILY MEDICINE

## 2023-08-25 PROCEDURE — 99212 OFFICE O/P EST SF 10 MIN: CPT | Performed by: FAMILY MEDICINE

## 2023-08-25 PROCEDURE — 1159F MED LIST DOCD IN RCRD: CPT | Performed by: FAMILY MEDICINE

## 2023-08-25 PROCEDURE — 3078F DIAST BP <80 MM HG: CPT | Performed by: FAMILY MEDICINE

## 2023-08-25 PROCEDURE — 1036F TOBACCO NON-USER: CPT | Performed by: FAMILY MEDICINE

## 2023-08-25 ASSESSMENT — ANXIETY QUESTIONNAIRES
6. BECOMING EASILY ANNOYED OR IRRITABLE: NOT AT ALL
5. BEING SO RESTLESS THAT IT IS HARD TO SIT STILL: NOT AT ALL
3. WORRYING TOO MUCH ABOUT DIFFERENT THINGS: NOT AT ALL
7. FEELING AFRAID AS IF SOMETHING AWFUL MIGHT HAPPEN: NOT AT ALL
2. NOT BEING ABLE TO STOP OR CONTROL WORRYING: NOT AT ALL
4. TROUBLE RELAXING: NOT AT ALL
IF YOU CHECKED OFF ANY PROBLEMS ON THIS QUESTIONNAIRE, HOW DIFFICULT HAVE THESE PROBLEMS MADE IT FOR YOU TO DO YOUR WORK, TAKE CARE OF THINGS AT HOME, OR GET ALONG WITH OTHER PEOPLE: NOT DIFFICULT AT ALL
GAD7 TOTAL SCORE: 0
1. FEELING NERVOUS, ANXIOUS, OR ON EDGE: NOT AT ALL

## 2023-08-25 ASSESSMENT — PATIENT HEALTH QUESTIONNAIRE - PHQ9
3. TROUBLE FALLING OR STAYING ASLEEP: NOT AT ALL
4. FEELING TIRED OR HAVING LITTLE ENERGY: SEVERAL DAYS
5. POOR APPETITE OR OVEREATING: NOT AT ALL
1. LITTLE INTEREST OR PLEASURE IN DOING THINGS: NOT AT ALL
7. TROUBLE CONCENTRATING ON THINGS, SUCH AS READING THE NEWSPAPER OR WATCHING TELEVISION: NOT AT ALL
2. FEELING DOWN, DEPRESSED OR HOPELESS: NOT AT ALL
9. THOUGHTS THAT YOU WOULD BE BETTER OFF DEAD, OR OF HURTING YOURSELF: NOT AT ALL
SUM OF ALL RESPONSES TO PHQ QUESTIONS 1-9: 1
6. FEELING BAD ABOUT YOURSELF - OR THAT YOU ARE A FAILURE OR HAVE LET YOURSELF OR YOUR FAMILY DOWN: NOT AT ALL
10. IF YOU CHECKED OFF ANY PROBLEMS, HOW DIFFICULT HAVE THESE PROBLEMS MADE IT FOR YOU TO DO YOUR WORK, TAKE CARE OF THINGS AT HOME, OR GET ALONG WITH OTHER PEOPLE: NOT DIFFICULT AT ALL
SUM OF ALL RESPONSES TO PHQ9 QUESTIONS 1 & 2: 0
8. MOVING OR SPEAKING SO SLOWLY THAT OTHER PEOPLE COULD HAVE NOTICED. OR THE OPPOSITE, BEING SO FIGETY OR RESTLESS THAT YOU HAVE BEEN MOVING AROUND A LOT MORE THAN USUAL: NOT AT ALL

## 2023-08-25 NOTE — ASSESSMENT & PLAN NOTE
Improving with counseling and medication.  DMITRI and PHQ-9 reviewed.  Reviewed behavioral health notes.  Recheck in 3 months

## 2023-08-25 NOTE — PROGRESS NOTES
Subjective   Patient ID: Reta Davis is a 63 y.o. female who presents for Follow-up (1 mth Sertraline med increase check).    Here for follow-up anxiety and depression.    Seeing Jennifer through behavioral health regularly.  Taking the 100 mg daily  Tolerating well  Feeling more relaxed  Less anxious and tense    Home BP checks are great 120's/70's  Yesterday 124/70  Sleeping well.         Review of Systems    Objective   /80   Pulse 88   Temp 36.8 °C (98.2 °F)   Resp 16   Wt 61.2 kg (135 lb)   SpO2 96%   BMI 22.29 kg/m²     Physical Exam  Vitals and nursing note reviewed.   Constitutional:       Appearance: Normal appearance.   Cardiovascular:      Rate and Rhythm: Normal rate and regular rhythm.   Pulmonary:      Effort: Pulmonary effort is normal.      Breath sounds: Normal breath sounds.   Musculoskeletal:      Cervical back: Normal range of motion.   Neurological:      Mental Status: She is alert.   Psychiatric:         Mood and Affect: Mood normal.         Behavior: Behavior normal.         Thought Content: Thought content normal.         Judgment: Judgment normal.         Assessment/Plan   Problem List Items Addressed This Visit       Depression, recurrent (CMS/HCC) - Primary     Improving with counseling and medication.  DMITRI and PHQ-9 reviewed.  Reviewed behavioral health notes.  Recheck in 3 months

## 2023-08-25 NOTE — PROGRESS NOTES
"Collaborative Care (CoCM)  Progress Note    Type of Interaction: In Office    Start Time: 1:30 PM    End Time: 2:21 PM        Appointment: Scheduled    Reason for Visit:   Chief Complaint   Patient presents with    Follow-up      Interval History / Patient Symptoms:     Patient Health Questionnaire-9 Score: 1 (8/25/2023  2:28 PM)  DMITRI-7 Total Score: 0 (8/25/2023  2:27 PM)      Interventions Provided: Homework F/U      Progress Made: Moderate    Response to Intervention: Patient reports overall doing very well; continues to score low anxiety and depression scores for DMITRI and PHQ.    Patient discussed homework assignment of mundane task focusing and how it can be used in different circumstances/life situations.  Patient reports that she would like to find a hobby she enjoys, and is hoping to get an e-bike to use in the fall and spring for exercise.    Discussed continuing to practice mediation and mundane task focusing, as well as starting to look ahead as the weather starts to cool down and gets darker outside earlier.  Patient expressed possibility of making a list of activities to do in the winter, such as the botanical gardens, art museum, and other activities to look forward to in order to combat depression.      Plan:   Continue to practice meditation and mundane task focusing from Module 4 of \"What, Me Worry\"    Follow Up / Next Appointment: Next appointment: 09/08/23  "

## 2023-08-25 NOTE — ASSESSMENT & PLAN NOTE
Stable  Repeat blood pressure normal.  Continue home blood pressure monitoring.  Recheck in 3 months

## 2023-09-05 ENCOUNTER — DOCUMENTATION (OUTPATIENT)
Dept: PRIMARY CARE | Facility: CLINIC | Age: 63
End: 2023-09-05
Payer: MEDICARE

## 2023-09-05 DIAGNOSIS — F41.1 GENERALIZED ANXIETY DISORDER: Primary | ICD-10-CM

## 2023-09-05 PROCEDURE — 99494 1ST/SBSQ PSYC COLLAB CARE: CPT | Performed by: FAMILY MEDICINE

## 2023-09-05 PROCEDURE — 99493 SBSQ PSYC COLLAB CARE MGMT: CPT | Performed by: FAMILY MEDICINE

## 2023-09-08 ENCOUNTER — SOCIAL WORK (OUTPATIENT)
Dept: PRIMARY CARE | Facility: CLINIC | Age: 63
End: 2023-09-08
Payer: MEDICARE

## 2023-09-08 DIAGNOSIS — F41.1 GENERALIZED ANXIETY DISORDER: Primary | ICD-10-CM

## 2023-09-08 ASSESSMENT — ANXIETY QUESTIONNAIRES
7. FEELING AFRAID AS IF SOMETHING AWFUL MIGHT HAPPEN: NOT AT ALL
1. FEELING NERVOUS, ANXIOUS, OR ON EDGE: NOT AT ALL
GAD7 TOTAL SCORE: 0
6. BECOMING EASILY ANNOYED OR IRRITABLE: NOT AT ALL
IF YOU CHECKED OFF ANY PROBLEMS ON THIS QUESTIONNAIRE, HOW DIFFICULT HAVE THESE PROBLEMS MADE IT FOR YOU TO DO YOUR WORK, TAKE CARE OF THINGS AT HOME, OR GET ALONG WITH OTHER PEOPLE: NOT DIFFICULT AT ALL
5. BEING SO RESTLESS THAT IT IS HARD TO SIT STILL: NOT AT ALL
3. WORRYING TOO MUCH ABOUT DIFFERENT THINGS: NOT AT ALL
4. TROUBLE RELAXING: NOT AT ALL
2. NOT BEING ABLE TO STOP OR CONTROL WORRYING: NOT AT ALL

## 2023-09-08 ASSESSMENT — PATIENT HEALTH QUESTIONNAIRE - PHQ9
6. FEELING BAD ABOUT YOURSELF - OR THAT YOU ARE A FAILURE OR HAVE LET YOURSELF OR YOUR FAMILY DOWN: NOT AT ALL
7. TROUBLE CONCENTRATING ON THINGS, SUCH AS READING THE NEWSPAPER OR WATCHING TELEVISION: NOT AT ALL
2. FEELING DOWN, DEPRESSED OR HOPELESS: NOT AT ALL
1. LITTLE INTEREST OR PLEASURE IN DOING THINGS: NOT AT ALL
SUM OF ALL RESPONSES TO PHQ9 QUESTIONS 1 & 2: 0
3. TROUBLE FALLING OR STAYING ASLEEP: NOT AT ALL
8. MOVING OR SPEAKING SO SLOWLY THAT OTHER PEOPLE COULD HAVE NOTICED. OR THE OPPOSITE, BEING SO FIGETY OR RESTLESS THAT YOU HAVE BEEN MOVING AROUND A LOT MORE THAN USUAL: NOT AT ALL
9. THOUGHTS THAT YOU WOULD BE BETTER OFF DEAD, OR OF HURTING YOURSELF: NOT AT ALL
10. IF YOU CHECKED OFF ANY PROBLEMS, HOW DIFFICULT HAVE THESE PROBLEMS MADE IT FOR YOU TO DO YOUR WORK, TAKE CARE OF THINGS AT HOME, OR GET ALONG WITH OTHER PEOPLE: NOT DIFFICULT AT ALL
4. FEELING TIRED OR HAVING LITTLE ENERGY: NOT AT ALL
5. POOR APPETITE OR OVEREATING: NOT AT ALL
SUM OF ALL RESPONSES TO PHQ QUESTIONS 1-9: 0

## 2023-09-08 NOTE — PROGRESS NOTES
Collaborative Care (CoCM)  Progress Note    Type of Interaction: In Office    Start Time: 12:56 PM    End Time: 1:45 PM        Appointment: Scheduled    Reason for Visit:   Chief Complaint   Patient presents with    Follow-up        Interval History / Patient Symptoms:     Patient Health Questionnaire-9 Score: 0 (9/8/2023  1:39 PM)  DMITRI-7 Total Score: 0 (9/8/2023  1:39 PM)        Interventions Provided: Psychoeducation and Strengths Exploration      Progress Made: Significant    Response to Intervention: Patient reports overall doing very well; discussed the journey of starting medication, how her moods have improved, but also understanding that there will be moments when her mood will fluctuate.   Patient has shown stable PHQ and DMITRI scores for a few weeks; will continue to monitor progress, particularly as the weather shifts.  Provided patient strategies to manage moods when the weather is bad, and planning ahead to be proactive of mental health.  Explored current strengths of patient; and continuing to find and do activities patient enjoys.  Plan is to schedule appointment in 1 month; if stable, will look at adding to relapse prevention.      Plan:     Identify activities to do during winter months; including starting up hobbies patient enjoyed prior to having children      Follow Up / Next Appointment: Next appointment: 10/16/23

## 2023-09-29 ENCOUNTER — DOCUMENTATION (OUTPATIENT)
Dept: PRIMARY CARE | Facility: CLINIC | Age: 63
End: 2023-09-29
Payer: MEDICARE

## 2023-09-29 DIAGNOSIS — F41.1 GENERALIZED ANXIETY DISORDER: Primary | ICD-10-CM

## 2023-09-29 PROCEDURE — 99493 SBSQ PSYC COLLAB CARE MGMT: CPT | Performed by: FAMILY MEDICINE

## 2023-10-02 NOTE — PROGRESS NOTES
Reta Davis female   1960 63 y.o.   [id number]      Chief Complaint   Annual exam, bilateral mammogram      HPI  story of Present Illness  Reta Davis is a pleasant 63 y.o.  woman [facility] up for left breast mammogram & high risk breast surveillance care. 2018 left breast biopsy noted benign fibroadenoma. She had history of left breast benign biopsy in late . She has family history of breast cancer in mother diagnosed age 68.    She declined endocrine therapy and breast MRI. She has annual CT Chest. abdomen and pelvis for surveillance for her appendix cancer.     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 She will be getting chemotherapy and additional surgery including AURELIANO with cytoreduction with intraperitoneal chemotherapy. 7/3/2018 she has AURELIANO BSO omentectomy and administration of HIPEC. She is in remission with active surveillance.    BREAST IMAGIN2022 screening mammogram, BI-RADS Category 1. No breast MRIs performed.     FEMALE HISTORY: menarche age 11, , first birth age 29, menopause age 50, no HRT, one breast biopsy, scattered breast tissue    FAMILY CANCER HISTORY:  Mother: breast cancer age 68  Sister: colon cancer     Review of Systems    Constitutional:  Negative for appetite change, fatigue, fever and unexpected weight change.   HENT:  Negative for ear pain, hearing loss, nosebleeds, sore throat and trouble swallowing.    Eyes:  Negative for discharge, itching and visual disturbance.   Respiratory:  Negative for cough, chest tightness and shortness of breath.    Cardiovascular:  Negative for chest pain, palpitations and leg swelling.   Gastrointestinal:  Negative for abdominal pain, constipation, diarrhea and nausea.   Endocrine: Negative for cold intolerance and heat  intolerance.   Genitourinary:  Negative for dysuria, frequency, hematuria, pelvic pain and vaginal bleeding.   Musculoskeletal:  Negative for arthralgias, back pain, gait problem, joint swelling and myalgias.   Skin:  Negative for color change and rash.   Allergic/Immunologic: Negative for environmental allergies and food allergies.   Neurological:  Negative for dizziness, tremors, speech difficulty, weakness, numbness and headaches.   Hematological:  Does not bruise/bleed easily.   Psychiatric/Behavioral:  Negative for agitation, dysphoric mood and sleep disturbance. The patient is not nervous/anxious.       Vitals  Vitals:    10/03/23 1124   BP: 150/84   Pulse: 83   Resp: 18   Temp: 36.9 °C (98.4 °F)   SpO2: 95%        Medications  Current Outpatient Medications   Medication Instructions    albuterol 90 mcg/actuation inhaler 1 puff, inhalation, Every 4 hours PRN    ascorbic acid (VITAMIN C) 250 mg, oral, Daily    cholecalciferol (VITAMIN D-3) 125 mcg, oral, Daily    lisinopril 20 mg, oral, Daily    montelukast (SINGULAIR) 10 mg, oral, Nightly    multivitamin tablet 1 tablet, oral, Daily    sertraline (ZOLOFT) 100 mg, oral, Daily        Allergies  Allergies   Allergen Reactions    Antihistamine [Diphenhydramine Hcl] Unknown    Levofloxacin Other and Swelling    Prednisolone Other     thrush    Tetracyclines Rash and Unknown        Social History  Social History     Tobacco Use    Smoking status: Former     Types: Cigarettes     Passive exposure: Past    Smokeless tobacco: Former   Substance Use Topics    Alcohol use: Not Currently        Physical Exam  Patient is alert and oriented x3, with appropriate mood. The gait is steady and hand grasps are equal. Sclera clear. The breasts are nearly symmetrical. The tissue is soft without palpable abnormalities, discrete nodules or masses. The skin and nipples appear normal. There is no cervical, supraclavicular, or axillary lymphadenopathy palpable. Heart rate and rhythm  normal, S1 and S2 appreciated. The lungs are clear bilaterally. Abdomen is soft & non-tender.    BI mammo bilateral screening tomosynthesis 10/03/2023    Narrative  Interpreted By:  Norma Reece,  Mary Chicas  STUDY:  BI MAMMO BILATERAL SCREENING TOMOSYNTHESIS;  10/3/2023 10:55 am    ACCESSION NUMBER(S):  [id number]    ORDERING CLINICIAN:  DIAMOND BUCKNER    INDICATION:  Screening. Family history of breast cancer in patient's mother at age  63.    COMPARISON:  08/29/2022, 03/22/2019, 07/07/2020, 08/02/2021.    FINDINGS:  2D and tomosynthesis images were reviewed at 1 mm slice thickness.    Density:  There are areas of scattered fibroglandular tissue.    A stable oval circumscribed mass containing a tissue marker is seen  in the superomedial left breast at middle depth, consistent with  patient's biopsy proven fibroadenoma. No suspicious masses or  calcifications are identified in either breast.    Impression  No mammographic evidence of malignancy.    BI-RADS CATEGORY:  BI-RADS Category:  2 Benign.  Recommendation:  Routine Screening Mammogram in 1 Year.  Recommended Date:  1 Year.  Laterality:  Bilateral.    For any future breast imaging appointments, please call [phone]  ([phone]).      MACRO:  None    Signed by: Norma Reece 10/3/2023 11:04 AM  Dictation workstation:   [id number]        Time was spent viewing digital images of the radiology testing with the patient. I explained the results in depth, along with suggested explanation for follow up recommendations based on the testing results.          Assessment/Plan       Thank you for coming to see me today at [facility]. Your clinical examination and imaging is normal. You no longer need to be seen by a surgical breast specialist. It is important to continue annual screening mammograms & clinical breast exams. Please return to see me if you have a new breast problem or abnormal mammogram. It has been a pleasure having you as  a patient.      High risk breast surveillance care plan:  Yearly mammogram with digital breast tomosynthesis  yearly clinical breast examinations  Monthly self breast examinations &/or regular self breast awareness  Vitamin D3 2000 IU/daily (over the counter) unless your PCP recommends you take a specific dose  Exercise 3-4 times per week for 45-60 minutes  Limit alcohol to 3-4 drinks per week if you are menopausal  Eat healthy low-fat diet with lots of vegetable & fruits  Risk models indicate personal risk of breast cancer in the next 5 years and  lifetime (age 85-90):  Breast Cancer Risk Assessment Tool (Misty): 5-year risk 3.9% (average 2%), lifetime risk 14.3% (average 7.8%).   Nataliyaer-Eldack: 5-year risk 6.1% (average 1.8%), lifetime risk 18.3%, (average 5.8%)      You can see your health information, review clinical summaries from office visits & test results online when you follow your health with MY  Chart, a personal health record. To sign up go to [email or web address]. If you need assistance with signing up or trouble getting into your account call [employer] Patient Line 24/7 at [phone].     My office phone number is [phone] if you need to get in touch with me or have additional questions or problems. Thank you for choosing [facility] and trusting me as your healthcare provider. I look forward to seeing you again at your next office visit. I am honored to be a provider on your health care team and I remain dedicated to helping    No follow-ups on file.

## 2023-10-03 ENCOUNTER — ANCILLARY PROCEDURE (OUTPATIENT)
Dept: RADIOLOGY | Facility: CLINIC | Age: 63
End: 2023-10-03
Payer: MEDICARE

## 2023-10-03 ENCOUNTER — OFFICE VISIT (OUTPATIENT)
Dept: SURGICAL ONCOLOGY | Facility: CLINIC | Age: 63
End: 2023-10-03
Payer: MEDICARE

## 2023-10-03 VITALS
HEIGHT: 65 IN | BODY MASS INDEX: 22.89 KG/M2 | SYSTOLIC BLOOD PRESSURE: 150 MMHG | WEIGHT: 137.4 LBS | TEMPERATURE: 98.4 F | DIASTOLIC BLOOD PRESSURE: 84 MMHG | RESPIRATION RATE: 18 BRPM | HEART RATE: 83 BPM | OXYGEN SATURATION: 95 %

## 2023-10-03 DIAGNOSIS — Z12.39 BREAST CANCER SCREENING, HIGH RISK PATIENT: ICD-10-CM

## 2023-10-03 DIAGNOSIS — Z12.31 ENCOUNTER FOR SCREENING MAMMOGRAM FOR MALIGNANT NEOPLASM OF BREAST: ICD-10-CM

## 2023-10-03 DIAGNOSIS — Z00.00 HEALTHCARE MAINTENANCE: Primary | ICD-10-CM

## 2023-10-03 PROCEDURE — 77067 SCR MAMMO BI INCL CAD: CPT | Mod: BILATERAL PROCEDURE | Performed by: STUDENT IN AN ORGANIZED HEALTH CARE EDUCATION/TRAINING PROGRAM

## 2023-10-03 PROCEDURE — 77067 SCR MAMMO BI INCL CAD: CPT | Mod: 50

## 2023-10-03 PROCEDURE — 1126F AMNT PAIN NOTED NONE PRSNT: CPT | Performed by: NURSE PRACTITIONER

## 2023-10-03 PROCEDURE — 1160F RVW MEDS BY RX/DR IN RCRD: CPT | Performed by: NURSE PRACTITIONER

## 2023-10-03 PROCEDURE — 99214 OFFICE O/P EST MOD 30 MIN: CPT | Performed by: NURSE PRACTITIONER

## 2023-10-03 PROCEDURE — 77063 BREAST TOMOSYNTHESIS BI: CPT | Mod: BILATERAL PROCEDURE | Performed by: STUDENT IN AN ORGANIZED HEALTH CARE EDUCATION/TRAINING PROGRAM

## 2023-10-03 PROCEDURE — 3079F DIAST BP 80-89 MM HG: CPT | Performed by: NURSE PRACTITIONER

## 2023-10-03 PROCEDURE — 3077F SYST BP >= 140 MM HG: CPT | Performed by: NURSE PRACTITIONER

## 2023-10-03 PROCEDURE — 1036F TOBACCO NON-USER: CPT | Performed by: NURSE PRACTITIONER

## 2023-10-03 PROCEDURE — 1159F MED LIST DOCD IN RCRD: CPT | Performed by: NURSE PRACTITIONER

## 2023-10-03 ASSESSMENT — PAIN SCALES - GENERAL: PAINLEVEL: 0-NO PAIN

## 2023-10-16 ENCOUNTER — SOCIAL WORK (OUTPATIENT)
Dept: PRIMARY CARE | Facility: CLINIC | Age: 63
End: 2023-10-16
Payer: MEDICARE

## 2023-10-16 ENCOUNTER — OFFICE VISIT (OUTPATIENT)
Dept: PRIMARY CARE | Facility: CLINIC | Age: 63
End: 2023-10-16
Payer: MEDICARE

## 2023-10-16 VITALS
HEART RATE: 83 BPM | TEMPERATURE: 97 F | RESPIRATION RATE: 16 BRPM | OXYGEN SATURATION: 97 % | WEIGHT: 137 LBS | SYSTOLIC BLOOD PRESSURE: 110 MMHG | DIASTOLIC BLOOD PRESSURE: 62 MMHG | BODY MASS INDEX: 22.8 KG/M2

## 2023-10-16 DIAGNOSIS — F41.1 GENERALIZED ANXIETY DISORDER: Primary | ICD-10-CM

## 2023-10-16 DIAGNOSIS — F41.1 GENERALIZED ANXIETY DISORDER: ICD-10-CM

## 2023-10-16 DIAGNOSIS — J47.9 BRONCHIECTASIS, UNCOMPLICATED (MULTI): ICD-10-CM

## 2023-10-16 DIAGNOSIS — F33.9 DEPRESSION, RECURRENT (CMS-HCC): ICD-10-CM

## 2023-10-16 DIAGNOSIS — E78.5 HYPERLIPIDEMIA, MILD: ICD-10-CM

## 2023-10-16 DIAGNOSIS — I10 BENIGN ESSENTIAL HYPERTENSION: Primary | ICD-10-CM

## 2023-10-16 DIAGNOSIS — L30.9 HAND ECZEMA: ICD-10-CM

## 2023-10-16 DIAGNOSIS — C18.1 PRIMARY CANCER OF APPENDIX (MULTI): ICD-10-CM

## 2023-10-16 PROCEDURE — 3074F SYST BP LT 130 MM HG: CPT | Performed by: FAMILY MEDICINE

## 2023-10-16 PROCEDURE — 1036F TOBACCO NON-USER: CPT | Performed by: FAMILY MEDICINE

## 2023-10-16 PROCEDURE — 1159F MED LIST DOCD IN RCRD: CPT | Performed by: FAMILY MEDICINE

## 2023-10-16 PROCEDURE — 3078F DIAST BP <80 MM HG: CPT | Performed by: FAMILY MEDICINE

## 2023-10-16 PROCEDURE — 1126F AMNT PAIN NOTED NONE PRSNT: CPT | Performed by: FAMILY MEDICINE

## 2023-10-16 PROCEDURE — 99214 OFFICE O/P EST MOD 30 MIN: CPT | Performed by: FAMILY MEDICINE

## 2023-10-16 PROCEDURE — 1160F RVW MEDS BY RX/DR IN RCRD: CPT | Performed by: FAMILY MEDICINE

## 2023-10-16 RX ORDER — LISINOPRIL 20 MG/1
20 TABLET ORAL DAILY
Qty: 90 TABLET | Refills: 1 | Status: SHIPPED | OUTPATIENT
Start: 2023-10-16 | End: 2024-01-19 | Stop reason: SDUPTHER

## 2023-10-16 RX ORDER — SERTRALINE HYDROCHLORIDE 50 MG/1
100 TABLET, FILM COATED ORAL DAILY
Qty: 60 TABLET | Refills: 3 | Status: SHIPPED | OUTPATIENT
Start: 2023-10-16 | End: 2023-12-06 | Stop reason: SDUPTHER

## 2023-10-16 ASSESSMENT — ANXIETY QUESTIONNAIRES
2. NOT BEING ABLE TO STOP OR CONTROL WORRYING: NOT AT ALL
IF YOU CHECKED OFF ANY PROBLEMS ON THIS QUESTIONNAIRE, HOW DIFFICULT HAVE THESE PROBLEMS MADE IT FOR YOU TO DO YOUR WORK, TAKE CARE OF THINGS AT HOME, OR GET ALONG WITH OTHER PEOPLE: NOT DIFFICULT AT ALL
7. FEELING AFRAID AS IF SOMETHING AWFUL MIGHT HAPPEN: NOT AT ALL
GAD7 TOTAL SCORE: 0
3. WORRYING TOO MUCH ABOUT DIFFERENT THINGS: NOT AT ALL
6. BECOMING EASILY ANNOYED OR IRRITABLE: NOT AT ALL
4. TROUBLE RELAXING: NOT AT ALL
1. FEELING NERVOUS, ANXIOUS, OR ON EDGE: NOT AT ALL
5. BEING SO RESTLESS THAT IT IS HARD TO SIT STILL: NOT AT ALL

## 2023-10-16 ASSESSMENT — PATIENT HEALTH QUESTIONNAIRE - PHQ9
5. POOR APPETITE OR OVEREATING: NOT AT ALL
3. TROUBLE FALLING OR STAYING ASLEEP: NOT AT ALL
10. IF YOU CHECKED OFF ANY PROBLEMS, HOW DIFFICULT HAVE THESE PROBLEMS MADE IT FOR YOU TO DO YOUR WORK, TAKE CARE OF THINGS AT HOME, OR GET ALONG WITH OTHER PEOPLE: NOT DIFFICULT AT ALL
SUM OF ALL RESPONSES TO PHQ QUESTIONS 1-9: 0
8. MOVING OR SPEAKING SO SLOWLY THAT OTHER PEOPLE COULD HAVE NOTICED. OR THE OPPOSITE, BEING SO FIGETY OR RESTLESS THAT YOU HAVE BEEN MOVING AROUND A LOT MORE THAN USUAL: NOT AT ALL
SUM OF ALL RESPONSES TO PHQ9 QUESTIONS 1 & 2: 0
9. THOUGHTS THAT YOU WOULD BE BETTER OFF DEAD, OR OF HURTING YOURSELF: NOT AT ALL
2. FEELING DOWN, DEPRESSED OR HOPELESS: NOT AT ALL
4. FEELING TIRED OR HAVING LITTLE ENERGY: NOT AT ALL
1. LITTLE INTEREST OR PLEASURE IN DOING THINGS: NOT AT ALL
7. TROUBLE CONCENTRATING ON THINGS, SUCH AS READING THE NEWSPAPER OR WATCHING TELEVISION: NOT AT ALL
6. FEELING BAD ABOUT YOURSELF - OR THAT YOU ARE A FAILURE OR HAVE LET YOURSELF OR YOUR FAMILY DOWN: NOT AT ALL

## 2023-10-16 NOTE — ASSESSMENT & PLAN NOTE
Stable  Checking labs and treat accordingly  Reviewed diet  Increase physical activity  Recheck in 6 months

## 2023-10-16 NOTE — ASSESSMENT & PLAN NOTE
Stable  Refilling meds at the same dose  Continue home BP monitoring  Checking labs  Recheck in 3 months

## 2023-10-16 NOTE — PROGRESS NOTES
Collaborative Care (CoCM)  Progress Note    Type of Interaction: In Office    Start Time: 12:30 PM    End Time: 1:12 PM    Appointment: Scheduled    Reason for Visit:   Chief Complaint   Patient presents with    Follow-up      Interval History / Patient Symptoms:     Patient Health Questionnaire-9 Score: 0 (10/16/2023 12:34 PM)  DMITRI-7 Total Score: 0 (10/16/2023 12:33 PM)    Interventions Provided: CBT    Progress Made: Significant    Response to Intervention: Patient continues to have low PHQ and DMITRI scores; identifies that she is doing well on the medication, and has not had any events trigger her anxiety/depression.  Will schedule out next appointment further out to confirm patient is stable, and then look at relapse prevention and graduation from program.    Plan:     Scheduled F/U further out, will discuss relapse prevention and graduation at next appointment if all is going well.    Follow Up / Next Appointment: Next appointment: 12/04/23

## 2023-10-16 NOTE — PROGRESS NOTES
Subjective   Patient ID: Reta Davis is a 63 y.o. female who presents for Follow-up (3 mth med and BP check).    Here for general check and med refills.     Taking meds daily for anxiety/depression and HTN    Last labs showed elevated cholesterol but improved.     Still seeing Behavorial health and doing well.  Still taking sertraline 100 mg     Occ home BP checks  Generally good,  Taking lisinopril daily  Denies chest pain, shortness of breath, lightheaded, dizziness or headaches.     Admits to a runny nose for years'  Allergies her whole life but worse over the past year.   Generally only in her home.   Antihistamines make her breathing worse.   Started dog sitting over the past  year  Tried flonase and had a bad reaction.    Will start exercise daily today walking outside.   Asthma and breathing are stable  Not needing MDI daily  Has not needed lately         Review of Systems    Objective   There were no vitals taken for this visit.    Physical Exam  Vitals and nursing note reviewed.   Constitutional:       Appearance: Normal appearance.   Cardiovascular:      Rate and Rhythm: Normal rate and regular rhythm.   Pulmonary:      Effort: Pulmonary effort is normal.      Breath sounds: Normal breath sounds.   Musculoskeletal:      Cervical back: Normal range of motion.   Neurological:      Mental Status: She is alert.   Psychiatric:         Mood and Affect: Mood normal.         Behavior: Behavior normal.         Thought Content: Thought content normal.         Judgment: Judgment normal.       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          Bronchiectasis, uncomplicated (CMS/[facility]) J47.9     Stable  Continue MDI as needed

## 2023-10-16 NOTE — ASSESSMENT & PLAN NOTE
OTC cortisone helping  Continue strong moisturizers and gloves as needed  Call or return if worsening.

## 2023-10-16 NOTE — ASSESSMENT & PLAN NOTE
>>ASSESSMENT AND PLAN FOR BRONCHIECTASIS, UNCOMPLICATED (MULTI) WRITTEN ON 10/16/2023  1:55 PM BY STEPHAN HIRSCH PLA, DO    Stable  Continue MDI as needed

## 2023-10-30 ENCOUNTER — DOCUMENTATION (OUTPATIENT)
Dept: PRIMARY CARE | Facility: CLINIC | Age: 63
End: 2023-10-30
Payer: MEDICARE

## 2023-10-30 DIAGNOSIS — F41.1 GENERALIZED ANXIETY DISORDER: Primary | ICD-10-CM

## 2023-10-30 PROCEDURE — 99493 SBSQ PSYC COLLAB CARE MGMT: CPT | Performed by: FAMILY MEDICINE

## 2023-11-07 ENCOUNTER — LAB (OUTPATIENT)
Dept: LAB | Facility: CLINIC | Age: 63
End: 2023-11-07
Payer: MEDICARE

## 2023-11-07 DIAGNOSIS — E78.5 HYPERLIPIDEMIA, MILD: ICD-10-CM

## 2023-11-07 DIAGNOSIS — C18.1 MALIGNANT NEOPLASM OF APPENDIX (MULTI): ICD-10-CM

## 2023-11-07 DIAGNOSIS — I10 BENIGN ESSENTIAL HYPERTENSION: ICD-10-CM

## 2023-11-07 LAB
BASOPHILS # BLD AUTO: 0.03 X10*3/UL (ref 0–0.1)
BASOPHILS NFR BLD AUTO: 0.4 %
EOSINOPHIL # BLD AUTO: 0.09 X10*3/UL (ref 0–0.7)
EOSINOPHIL NFR BLD AUTO: 1.3 %
ERYTHROCYTE [DISTWIDTH] IN BLOOD BY AUTOMATED COUNT: 12.3 % (ref 11.5–14.5)
HCT VFR BLD AUTO: 43.8 % (ref 36–46)
HGB BLD-MCNC: 13.8 G/DL (ref 12–16)
IMM GRANULOCYTES # BLD AUTO: 0.01 X10*3/UL (ref 0–0.7)
IMM GRANULOCYTES NFR BLD AUTO: 0.1 % (ref 0–0.9)
LYMPHOCYTES # BLD AUTO: 1.21 X10*3/UL (ref 1.2–4.8)
LYMPHOCYTES NFR BLD AUTO: 17.9 %
MCH RBC QN AUTO: 31.6 PG (ref 26–34)
MCHC RBC AUTO-ENTMCNC: 31.5 G/DL (ref 32–36)
MCV RBC AUTO: 100 FL (ref 80–100)
MONOCYTES # BLD AUTO: 0.42 X10*3/UL (ref 0.1–1)
MONOCYTES NFR BLD AUTO: 6.2 %
NEUTROPHILS # BLD AUTO: 5.01 X10*3/UL (ref 1.2–7.7)
NEUTROPHILS NFR BLD AUTO: 74.1 %
NRBC BLD-RTO: ABNORMAL /100{WBCS}
PLATELET # BLD AUTO: 233 X10*3/UL (ref 150–450)
RBC # BLD AUTO: 4.37 X10*6/UL (ref 4–5.2)
WBC # BLD AUTO: 6.8 X10*3/UL (ref 4.4–11.3)

## 2023-11-07 PROCEDURE — 82378 CARCINOEMBRYONIC ANTIGEN: CPT | Performed by: NURSE PRACTITIONER

## 2023-11-07 PROCEDURE — 80061 LIPID PANEL: CPT | Performed by: FAMILY MEDICINE

## 2023-11-07 PROCEDURE — 80053 COMPREHEN METABOLIC PANEL: CPT | Performed by: FAMILY MEDICINE

## 2023-11-07 PROCEDURE — 36415 COLL VENOUS BLD VENIPUNCTURE: CPT

## 2023-11-07 PROCEDURE — 85025 COMPLETE CBC W/AUTO DIFF WBC: CPT

## 2023-11-08 LAB
ALBUMIN SERPL BCP-MCNC: 4.6 G/DL (ref 3.4–5)
ALP SERPL-CCNC: 62 U/L (ref 33–136)
ALT SERPL W P-5'-P-CCNC: 87 U/L (ref 7–45)
ANION GAP SERPL CALC-SCNC: 14 MMOL/L (ref 10–20)
AST SERPL W P-5'-P-CCNC: 51 U/L (ref 9–39)
BILIRUB SERPL-MCNC: 0.5 MG/DL (ref 0–1.2)
BUN SERPL-MCNC: 13 MG/DL (ref 6–23)
CALCIUM SERPL-MCNC: 9.7 MG/DL (ref 8.6–10.6)
CEA SERPL-MCNC: 1.5 UG/L
CHLORIDE SERPL-SCNC: 105 MMOL/L (ref 98–107)
CHOLEST SERPL-MCNC: 244 MG/DL (ref 0–199)
CHOLESTEROL/HDL RATIO: 3.1
CO2 SERPL-SCNC: 29 MMOL/L (ref 21–32)
CREAT SERPL-MCNC: 0.6 MG/DL (ref 0.5–1.05)
GFR SERPL CREATININE-BSD FRML MDRD: >90 ML/MIN/1.73M*2
GLUCOSE SERPL-MCNC: 85 MG/DL (ref 74–99)
HDLC SERPL-MCNC: 77.8 MG/DL
LDLC SERPL CALC-MCNC: 149 MG/DL
NON HDL CHOLESTEROL: 166 MG/DL (ref 0–149)
POTASSIUM SERPL-SCNC: 3.9 MMOL/L (ref 3.5–5.3)
PROT SERPL-MCNC: 7.2 G/DL (ref 6.4–8.2)
SODIUM SERPL-SCNC: 144 MMOL/L (ref 136–145)
TRIGL SERPL-MCNC: 87 MG/DL (ref 0–149)
VLDL: 17 MG/DL (ref 0–40)

## 2023-11-10 ENCOUNTER — ANCILLARY PROCEDURE (OUTPATIENT)
Dept: RADIOLOGY | Facility: CLINIC | Age: 63
End: 2023-11-10
Payer: MEDICARE

## 2023-11-10 DIAGNOSIS — C18.1 MALIGNANT NEOPLASM OF APPENDIX (MULTI): Primary | ICD-10-CM

## 2023-11-10 PROCEDURE — 74177 CT ABD & PELVIS W/CONTRAST: CPT

## 2023-11-10 PROCEDURE — 2550000001 HC RX 255 CONTRASTS: Performed by: NURSE PRACTITIONER

## 2023-11-10 RX ADMIN — IOHEXOL 75 ML: 350 INJECTION, SOLUTION INTRAVENOUS at 09:54

## 2023-11-14 ENCOUNTER — OFFICE VISIT (OUTPATIENT)
Dept: HEMATOLOGY/ONCOLOGY | Facility: CLINIC | Age: 63
End: 2023-11-14
Payer: MEDICARE

## 2023-11-14 VITALS
HEART RATE: 86 BPM | WEIGHT: 142.42 LBS | DIASTOLIC BLOOD PRESSURE: 80 MMHG | RESPIRATION RATE: 18 BRPM | SYSTOLIC BLOOD PRESSURE: 152 MMHG | OXYGEN SATURATION: 96 % | BODY MASS INDEX: 23.7 KG/M2 | TEMPERATURE: 97.3 F

## 2023-11-14 DIAGNOSIS — C18.1 PRIMARY CANCER OF APPENDIX (MULTI): Primary | ICD-10-CM

## 2023-11-14 PROCEDURE — 1126F AMNT PAIN NOTED NONE PRSNT: CPT | Performed by: NURSE PRACTITIONER

## 2023-11-14 PROCEDURE — 3079F DIAST BP 80-89 MM HG: CPT | Performed by: NURSE PRACTITIONER

## 2023-11-14 PROCEDURE — 1160F RVW MEDS BY RX/DR IN RCRD: CPT | Performed by: NURSE PRACTITIONER

## 2023-11-14 PROCEDURE — 3077F SYST BP >= 140 MM HG: CPT | Performed by: NURSE PRACTITIONER

## 2023-11-14 PROCEDURE — 1159F MED LIST DOCD IN RCRD: CPT | Performed by: NURSE PRACTITIONER

## 2023-11-14 PROCEDURE — 99214 OFFICE O/P EST MOD 30 MIN: CPT | Performed by: NURSE PRACTITIONER

## 2023-11-14 PROCEDURE — 99214 OFFICE O/P EST MOD 30 MIN: CPT | Mod: PO | Performed by: NURSE PRACTITIONER

## 2023-11-14 PROCEDURE — 1036F TOBACCO NON-USER: CPT | Performed by: NURSE PRACTITIONER

## 2023-11-14 ASSESSMENT — PAIN SCALES - GENERAL: PAINLEVEL: 0-NO PAIN

## 2023-11-14 NOTE — PROGRESS NOTES
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, MMR nl     - 18 FOLFOX C1        - 18 C2, 2/15/18 C3,  3/01/18 C4 5FU bolus reduced to 300mg/m2 and oxali 75mg/m2 d/t Sfx),  3/15/18 C5       - delayed 1wk C6 for elevated LFTs, given   - 2018 CT shows [facility] but new portal vein thrombosis      - started on anticoagulation but then had  bleeding so held  - C7  5/3/18 oxaliplatin held for CIPN, bolus at         - C8 18,  C9 ,   - developed PVT 2018, decided to move forward with surgery  - 7/3/18 debulking surgery and HIPEC        Current Tx: surveillance  Last Imagin2020  Genetics: MMR nl        History of Present Illness:      ID Statement:    NOAM GORDON is a 63 year old Female        Chief Complaint: Appendiceal carcinoma   Interval History:    Mrs. Gordon is a 62 y/o woman with a met appendiceal cancer, s/p initial surgery for appendectomy, S/p 6 months of dasia-adjuvant FOLFOX x 9, oxaliplatin omitted after  C6 for peripheral neuropathy. Total abdominal hysterectomy with salpingo/oophrectomay 18 with intraperitoneal mitomycin. Pathology from surgery negative for malignancy.     Returns for active surveillance  Feeling better than last visit  - started zoloft which is helping with mood / anxiety     - on singular & albuterol for bronchiolitis          No fevers, chills, chest pain, shortness of breath, abdominal pain, nausea, vomiting, rashes or masses.   ROS as above. Remainder of 10 point review of systems elicited and otherwise unremarkable.       Review of Systems:   ·  Respiratory POSITIVE: Dry Cough       Physical Exam:      Constitutional: anxious   Eyes: PERRL, clear sclera   ENMT: mucous membranes moist, no apparent injury,  no lesions seen   Head/Neck: Neck supple, no apparent injury   Respiratory/Thorax: CTAB, no  crackles or wheezes   Cardiovascular: Regular rate and rhythm, no murmurs,  normal S 1and S 2   Gastrointestinal: Nondistended, soft, non-tender,  no rebound tenderness or guarding, no masses palpable, no organomegaly, +BS   Musculoskeletal: ambulates independently   Extremities: normal extremities, no edema   Neurological: alert and oriented x3   Psychological: appropriate mood and affect   Skin: Warm and dry, no lesions, no rashes     Lab on 11/07/2023   Component Date Value Ref Range Status    Carcinoembryonic AG 11/07/2023 1.5  ug/L Final    WBC 11/07/2023 6.8  4.4 - 11.3 x10*3/uL Final    nRBC 11/07/2023    Final    Not Measured    RBC 11/07/2023 4.37  4.00 - 5.20 x10*6/uL Final    Hemoglobin 11/07/2023 13.8  12.0 - 16.0 g/dL Final    Hematocrit 11/07/2023 43.8  36.0 - 46.0 % Final    MCV 11/07/2023 100  80 - 100 fL Final    MCH 11/07/2023 31.6  26.0 - 34.0 pg Final    MCHC 11/07/2023 31.5 (L)  32.0 - 36.0 g/dL Final    RDW 11/07/2023 12.3  11.5 - 14.5 % Final    Platelets 11/07/2023 233  150 - 450 x10*3/uL Final    Neutrophils % 11/07/2023 74.1  40.0 - 80.0 % Final    Immature Granulocytes %, Automated 11/07/2023 0.1  0.0 - 0.9 % Final    Immature Granulocyte Count (IG) includes promyelocytes, myelocytes and metamyelocytes but does not include bands. Percent differential counts (%) should be interpreted in the context of the absolute cell counts (cells/UL).    Lymphocytes % 11/07/2023 17.9  13.0 - 44.0 % Final    Monocytes % 11/07/2023 6.2  2.0 - 10.0 % Final    Eosinophils % 11/07/2023 1.3  0.0 - 6.0 % Final    Basophils % 11/07/2023 0.4  0.0 - 2.0 % Final    Neutrophils Absolute 11/07/2023 5.01  1.20 - 7.70 x10*3/uL Final    Percent differential counts (%) should be interpreted in the context of the absolute cell counts (cells/uL).    Immature Granulocytes Absolute, Au* 11/07/2023 0.01  0.00 - 0.70 x10*3/uL Final    Lymphocytes Absolute 11/07/2023 1.21  1.20 - 4.80 x10*3/uL Final    Monocytes Absolute  11/07/2023 0.42  0.10 - 1.00 x10*3/uL Final    Eosinophils Absolute 11/07/2023 0.09  0.00 - 0.70 x10*3/uL Final    Basophils Absolute 11/07/2023 0.03  0.00 - 0.10 x10*3/uL Final    Glucose 11/07/2023 85  74 - 99 mg/dL Final    Sodium 11/07/2023 144  136 - 145 mmol/L Final    Potassium 11/07/2023 3.9  3.5 - 5.3 mmol/L Final    Chloride 11/07/2023 105  98 - 107 mmol/L Final    Bicarbonate 11/07/2023 29  21 - 32 mmol/L Final    Anion Gap 11/07/2023 14  10 - 20 mmol/L Final    Urea Nitrogen 11/07/2023 13  6 - 23 mg/dL Final    Creatinine 11/07/2023 0.60  0.50 - 1.05 mg/dL Final    eGFR 11/07/2023 >90  >60 mL/min/1.73m*2 Final    Calculations of estimated GFR are performed using the 2021 CKD-EPI Study Refit equation without the race variable for the IDMS-Traceable creatinine methods.  https://jasn.asnjournals.org/content/early/2021/09/22/ASN.7787291374    Calcium 11/07/2023 9.7  8.6 - 10.6 mg/dL Final    Albumin 11/07/2023 4.6  3.4 - 5.0 g/dL Final    Alkaline Phosphatase 11/07/2023 62  33 - 136 U/L Final    Total Protein 11/07/2023 7.2  6.4 - 8.2 g/dL Final    AST 11/07/2023 51 (H)  9 - 39 U/L Final    Bilirubin, Total 11/07/2023 0.5  0.0 - 1.2 mg/dL Final    ALT 11/07/2023 87 (H)  7 - 45 U/L Final    Patients treated with Sulfasalazine may generate falsely decreased results for ALT.    Cholesterol 11/07/2023 244 (H)  0 - 199 mg/dL Final          Age      Desirable   Borderline High   High     0-19 Y     0 - 169       170 - 199     >/= 200    20-24 Y     0 - 189       190 - 224     >/= 225         >24 Y     0 - 199       200 - 239     >/= 240   **All ranges are based on fasting samples. Specific   therapeutic targets will vary based on patient-specific   cardiac risk.    Pediatric guidelines reference:Pediatrics 2011, 128(S5).Adult guidelines reference: NCEP ATPIII Guidelines,MELONIE 2001, 258:2486-97    Venipuncture immediately after or during the administration of Metamizole may lead to falsely low results. Testing  should be performed immediately prior to Metamizole dosing.    HDL-Cholesterol 11/07/2023 77.8  mg/dL Final      Age       Very Low   Low     Normal    High    0-19 Y    < 35      < 40     40-45     ----  20-24 Y    ----     < 40      >45      ----        >24 Y      ----     < 40     40-60      >60      Cholesterol/HDL Ratio 11/07/2023 3.1   Final      Ref Values  Desirable  < 3.4  High Risk  > 5.0    LDL Calculated 11/07/2023 149 (H)  <=99 mg/dL Final                                Near   Borderline      AGE      Desirable  Optimal    High     High     Very High     0-19 Y     0 - 109     ---    110-129   >/= 130     ----    20-24 Y     0 - 119     ---    120-159   >/= 160     ----      >24 Y     0 -  99   100-129  130-159   160-189     >/=190      VLDL 11/07/2023 17  0 - 40 mg/dL Final    Triglycerides 11/07/2023 87  0 - 149 mg/dL Final       Age         Desirable   Borderline High   High     Very High   0 D-90 D    19 - 174         ----         ----        ----  91 D- 9 Y     0 -  74        75 -  99     >/= 100      ----    10-19 Y     0 -  89        90 - 129     >/= 130      ----    20-24 Y     0 - 114       115 - 149     >/= 150      ----         >24 Y     0 - 149       150 - 199    200- 499    >/= 500    Venipuncture immediately after or during the administration of Metamizole may lead to falsely low results. Testing should be performed immediately prior to Metamizole dosing.    Non HDL Cholesterol 11/07/2023 166 (H)  0 - 149 mg/dL Final          Age       Desirable   Borderline High   High     Very High     0-19 Y     0 - 119       120 - 144     >/= 145    >/= 160    20-24 Y     0 - 149       150 - 189     >/= 190      ----         >24 Y    30 mg/dL above LDL Cholesterol goal      Assessment and Plan:      IMPRESSION: CT - 11/10/23   Appendiceal cancer restaging exam:  1. Status post partial right hemicolectomy with no locoregional  recurrence or distant metastatic disease.  2. Bibasilar bronchiectasis with  chronic peribronchial wall  thickening, bibasilar tree-in-bud nodularity, and slight worsening in  bibasilar mucous plugging and left lower lobe atelectasis. Correlate  with bronchiolitis.      Assessment and Plan:   Assessment:    Mrs. Davis is a 61 y/o woman with met appendiceal cancer, s/p surgery for appendectomy. s/p  dasia-adjuvant chemotherapy with FOLFOX x 9 (5FU only after C6 d/t neuropathy)     # Appendiceal cancer, [id number]? (through serosa with mucinous material)  --  s/p FOLFOX x 9, last 5/31/2018         -5FU bolus reduced by 25% and Oxaliplatin dose reduced to 75mg/m2 in C4         - Oxaliplatin held d/t CIPN in C7        - d/t side effects of chemo decided not to complete last 3 cycles  - Surgery 07/03/2018, received intraperitoneal mitomycin & pathology with no evidence of cancer  - colonoscopy 8/10/20 was unremarkable -- next AUG 2023- overdue, will schedule   - CT 11/2023 [facility]  - Now ~5 years out from treatment without evidence of recurrence,    She has completed 5 yrs of active surveillance, no further follow up with medical oncology needed.  She would still like to have survivorship visits every 6 mos so we will schedule this   No further imaging needed     # Preventative medicine:   - Has established care with PCP, following with them for HTN and asthma/ sob      #Anxiety -      improved on zoloft     # Hx of Portal Vein Thrombus:   - No longer on rivaroxaban due to hematuria and bleeding  - s/p lovenox x 1yr  - CT with resolution      # Pancreatic lesion  -  MRI pancreas with [facility]CP [facility]

## 2023-11-17 DIAGNOSIS — C18.1 PRIMARY CANCER OF APPENDIX (MULTI): Primary | ICD-10-CM

## 2023-11-20 DIAGNOSIS — Z12.11 COLON CANCER SCREENING: Primary | ICD-10-CM

## 2023-11-20 RX ORDER — SODIUM PICOSULFATE, MAGNESIUM OXIDE, AND ANHYDROUS CITRIC ACID 12; 3.5; 1 G/175ML; G/175ML; MG/175ML
LIQUID ORAL
Qty: 350 ML | Refills: 0 | Status: SHIPPED | OUTPATIENT
Start: 2023-11-20

## 2023-11-27 ENCOUNTER — OFFICE VISIT (OUTPATIENT)
Dept: OBSTETRICS AND GYNECOLOGY | Facility: CLINIC | Age: 63
End: 2023-11-27
Payer: MEDICARE

## 2023-11-27 VITALS
WEIGHT: 143 LBS | HEIGHT: 66 IN | DIASTOLIC BLOOD PRESSURE: 73 MMHG | BODY MASS INDEX: 22.98 KG/M2 | SYSTOLIC BLOOD PRESSURE: 140 MMHG

## 2023-11-27 DIAGNOSIS — Z78.0 MENOPAUSE: ICD-10-CM

## 2023-11-27 DIAGNOSIS — Z01.419 ENCOUNTER FOR GYNECOLOGICAL EXAMINATION WITHOUT ABNORMAL FINDING: Primary | ICD-10-CM

## 2023-11-27 PROCEDURE — 1126F AMNT PAIN NOTED NONE PRSNT: CPT | Performed by: OBSTETRICS & GYNECOLOGY

## 2023-11-27 PROCEDURE — 1160F RVW MEDS BY RX/DR IN RCRD: CPT | Performed by: OBSTETRICS & GYNECOLOGY

## 2023-11-27 PROCEDURE — 99214 OFFICE O/P EST MOD 30 MIN: CPT | Performed by: OBSTETRICS & GYNECOLOGY

## 2023-11-27 PROCEDURE — 1036F TOBACCO NON-USER: CPT | Performed by: OBSTETRICS & GYNECOLOGY

## 2023-11-27 PROCEDURE — 1159F MED LIST DOCD IN RCRD: CPT | Performed by: OBSTETRICS & GYNECOLOGY

## 2023-11-27 PROCEDURE — 3078F DIAST BP <80 MM HG: CPT | Performed by: OBSTETRICS & GYNECOLOGY

## 2023-11-27 PROCEDURE — 3077F SYST BP >= 140 MM HG: CPT | Performed by: OBSTETRICS & GYNECOLOGY

## 2023-11-27 NOTE — PROGRESS NOTES
Subjective   Reta Davis is a 63 y.o. female here for a routine exam. Current complaints: She has a history of a hysterectomy with BSO in 2018 and a diagnosis with appendiceal cancer in 2017.    Her vaginal Pap in November 2022 was negative, high risk HPV negative.  She has no postmenopausal bleeding or pelvic pain.  No dysuria, no change in bowel habits or vaginal discharge.    She does have a colonoscopy planned in January 2024.  She is current on her breast imaging with the breast specialist..   Personal health questionnaire reviewed: yes.     Gynecologic History  Patient's last menstrual period was 01/01/2008 (approximate).  Contraception: status post hysterectomy  Last Pap: 11/14/22. Results were: normal  Last mammogram: 10/3/23. Results were: normal    Obstetric History  OB History   No obstetric history on file.       Objective   Constitutional: Alert and in no acute distress. Well developed, well nourished.   Head and Face: Head and face: Normal.    Eyes: Normal external exam - nonicteric sclera, extraocular movements intact (EOMI) and no ptosis.   Neck: No neck asymmetry. Supple. Thyroid not enlarged and there were no palpable thyroid nodules.    Pulmonary: No respiratory distress.   Chest: Breasts: Normal appearance, no nipple discharge and no skin changes. Palpation of breasts and axillae: No palpable mass and no axillary lymphadenopathy.   Abdomen: Soft nontender; no abdominal mass palpated. No organomegaly. No hernias.   Genitourinary: External genitalia: Normal. No inguinal lymphadenopathy. Bartholin's Urethral and Skenes Glands: Normal. Urethra: Normal.  Bladder: Normal on palpation. Vagina: Normal.  Inspection of Perianal Area: Normal.   Musculoskeletal: No joint swelling seen, normal movements of all extremities.   Skin: Normal skin color and pigmentation, normal skin turgor, and no rash.   Neurologic: Non-focal. Grossly intact.   Psychiatric: Alert and oriented x 3. Affect normal to patient  baseline. Mood: Appropriate.  Physical Exam     Assessment/Plan   Healthy female exam.  This is a 63-year-old female with a normal exam.  She has a history of a hysterectomy with BSO and no further Pap smears are needed.  She is high risk HPV negative in 2022.   She has a history of appendiceal cancer and is current on her colonoscopy.  We discussed follow-up in 2 years, or sooner as needed.  Mammogram ordered.

## 2023-11-28 ENCOUNTER — APPOINTMENT (OUTPATIENT)
Dept: RADIOLOGY | Facility: CLINIC | Age: 63
End: 2023-11-28
Payer: MEDICARE

## 2023-11-30 ENCOUNTER — ANCILLARY PROCEDURE (OUTPATIENT)
Dept: RADIOLOGY | Facility: CLINIC | Age: 63
End: 2023-11-30
Payer: MEDICARE

## 2023-11-30 DIAGNOSIS — Z78.0 MENOPAUSE: ICD-10-CM

## 2023-11-30 PROCEDURE — 77080 DXA BONE DENSITY AXIAL: CPT

## 2023-11-30 PROCEDURE — 77080 DXA BONE DENSITY AXIAL: CPT | Performed by: RADIOLOGY

## 2023-12-04 ENCOUNTER — SOCIAL WORK (OUTPATIENT)
Dept: PRIMARY CARE | Facility: CLINIC | Age: 63
End: 2023-12-04
Payer: MEDICARE

## 2023-12-04 DIAGNOSIS — F41.1 GENERALIZED ANXIETY DISORDER: Primary | ICD-10-CM

## 2023-12-04 ASSESSMENT — PATIENT HEALTH QUESTIONNAIRE - PHQ9
4. FEELING TIRED OR HAVING LITTLE ENERGY: NOT AT ALL
3. TROUBLE FALLING OR STAYING ASLEEP: NOT AT ALL
7. TROUBLE CONCENTRATING ON THINGS, SUCH AS READING THE NEWSPAPER OR WATCHING TELEVISION: NOT AT ALL
SUM OF ALL RESPONSES TO PHQ QUESTIONS 1-9: 0
SUM OF ALL RESPONSES TO PHQ9 QUESTIONS 1 & 2: 0
1. LITTLE INTEREST OR PLEASURE IN DOING THINGS: NOT AT ALL
5. POOR APPETITE OR OVEREATING: NOT AT ALL
9. THOUGHTS THAT YOU WOULD BE BETTER OFF DEAD, OR OF HURTING YOURSELF: NOT AT ALL
8. MOVING OR SPEAKING SO SLOWLY THAT OTHER PEOPLE COULD HAVE NOTICED. OR THE OPPOSITE, BEING SO FIGETY OR RESTLESS THAT YOU HAVE BEEN MOVING AROUND A LOT MORE THAN USUAL: NOT AT ALL
2. FEELING DOWN, DEPRESSED OR HOPELESS: NOT AT ALL
6. FEELING BAD ABOUT YOURSELF - OR THAT YOU ARE A FAILURE OR HAVE LET YOURSELF OR YOUR FAMILY DOWN: NOT AT ALL
10. IF YOU CHECKED OFF ANY PROBLEMS, HOW DIFFICULT HAVE THESE PROBLEMS MADE IT FOR YOU TO DO YOUR WORK, TAKE CARE OF THINGS AT HOME, OR GET ALONG WITH OTHER PEOPLE: NOT DIFFICULT AT ALL

## 2023-12-04 ASSESSMENT — ANXIETY QUESTIONNAIRES
6. BECOMING EASILY ANNOYED OR IRRITABLE: NOT AT ALL
5. BEING SO RESTLESS THAT IT IS HARD TO SIT STILL: NOT AT ALL
7. FEELING AFRAID AS IF SOMETHING AWFUL MIGHT HAPPEN: NOT AT ALL
GAD7 TOTAL SCORE: 0
1. FEELING NERVOUS, ANXIOUS, OR ON EDGE: NOT AT ALL
3. WORRYING TOO MUCH ABOUT DIFFERENT THINGS: NOT AT ALL
IF YOU CHECKED OFF ANY PROBLEMS ON THIS QUESTIONNAIRE, HOW DIFFICULT HAVE THESE PROBLEMS MADE IT FOR YOU TO DO YOUR WORK, TAKE CARE OF THINGS AT HOME, OR GET ALONG WITH OTHER PEOPLE: NOT DIFFICULT AT ALL
4. TROUBLE RELAXING: NOT AT ALL
2. NOT BEING ABLE TO STOP OR CONTROL WORRYING: NOT AT ALL

## 2023-12-04 NOTE — PROGRESS NOTES
Collaborative Care (CoCM)  Progress Note    Type of Interaction: In Office    Start Time: 1:05P    End Time: 1:35P        Appointment: Scheduled    Reason for Visit:   Chief Complaint   Patient presents with    Follow-up      Interval History / Patient Symptoms:     Patient Health Questionnaire-9 Score: 0 (12/4/2023  1:00 PM)  DMITRI-7 Total Score: 0 (12/4/2023  1:00 PM)  Pts scores remain consistent with minimal/no depression or anxiety    Interventions Provided: Review Progress/Goals Stress Management and Treatment Planning      Progress Made: Significant    Response to Intervention:   Discussed patient's response to medication, and pts hope of one day weaning off.  States that her last bloodwork results showed elevated liver enzymes and she has had a consistent runny nose since starting the medication.  Pt states she did not notice the runny nose prior to starting.  Advised patient to discuss further with PCP.  Pt reports walking up to 5x/week and hopes to continue to walk throughout the winter.     Plan:     Pt in relapse prevention; will follow up after her next PCP appt on 1/19/23; if patient remains stable, will close CoCM services at that time.      Follow Up / Next Appointment: Next appointment: 01/19/24      
normal...

## 2023-12-06 DIAGNOSIS — F33.9 DEPRESSION, RECURRENT (CMS-HCC): ICD-10-CM

## 2023-12-06 RX ORDER — SERTRALINE HYDROCHLORIDE 50 MG/1
100 TABLET, FILM COATED ORAL DAILY
Qty: 60 TABLET | Refills: 3 | Status: SHIPPED | OUTPATIENT
Start: 2023-12-06 | End: 2024-04-09 | Stop reason: SDUPTHER

## 2023-12-29 ENCOUNTER — DOCUMENTATION (OUTPATIENT)
Dept: PRIMARY CARE | Facility: CLINIC | Age: 63
End: 2023-12-29
Payer: MEDICARE

## 2023-12-29 DIAGNOSIS — F41.1 GENERALIZED ANXIETY DISORDER: Primary | ICD-10-CM

## 2023-12-29 PROCEDURE — G2214 INIT/SUB PSYCH CARE M 1ST 30: HCPCS | Performed by: FAMILY MEDICINE

## 2024-01-19 ENCOUNTER — LAB (OUTPATIENT)
Dept: LAB | Facility: LAB | Age: 64
End: 2024-01-19
Payer: MEDICARE

## 2024-01-19 ENCOUNTER — OFFICE VISIT (OUTPATIENT)
Dept: PRIMARY CARE | Facility: CLINIC | Age: 64
End: 2024-01-19
Payer: MEDICARE

## 2024-01-19 ENCOUNTER — APPOINTMENT (OUTPATIENT)
Dept: PRIMARY CARE | Facility: CLINIC | Age: 64
End: 2024-01-19
Payer: MEDICARE

## 2024-01-19 VITALS
RESPIRATION RATE: 16 BRPM | OXYGEN SATURATION: 96 % | BODY MASS INDEX: 23.72 KG/M2 | SYSTOLIC BLOOD PRESSURE: 128 MMHG | HEIGHT: 65 IN | DIASTOLIC BLOOD PRESSURE: 74 MMHG | TEMPERATURE: 98.4 F | HEART RATE: 94 BPM | WEIGHT: 142.4 LBS

## 2024-01-19 DIAGNOSIS — J45.20 MILD INTERMITTENT EXTRINSIC ASTHMA WITHOUT COMPLICATION (HHS-HCC): ICD-10-CM

## 2024-01-19 DIAGNOSIS — J45.40 MODERATE PERSISTENT EXTRINSIC ASTHMA WITHOUT COMPLICATION (HHS-HCC): ICD-10-CM

## 2024-01-19 DIAGNOSIS — C18.1 PRIMARY CANCER OF APPENDIX (MULTI): ICD-10-CM

## 2024-01-19 DIAGNOSIS — E78.5 HYPERLIPIDEMIA, MILD: ICD-10-CM

## 2024-01-19 DIAGNOSIS — R79.89 ELEVATED LFTS: ICD-10-CM

## 2024-01-19 DIAGNOSIS — F33.9 DEPRESSION, RECURRENT (CMS-HCC): ICD-10-CM

## 2024-01-19 DIAGNOSIS — I10 BENIGN ESSENTIAL HYPERTENSION: Primary | ICD-10-CM

## 2024-01-19 DIAGNOSIS — J47.9 BRONCHIECTASIS, UNCOMPLICATED (MULTI): ICD-10-CM

## 2024-01-19 LAB
ALBUMIN SERPL BCP-MCNC: 4.5 G/DL (ref 3.4–5)
ALP SERPL-CCNC: 57 U/L (ref 33–136)
ALT SERPL W P-5'-P-CCNC: 25 U/L (ref 7–45)
AST SERPL W P-5'-P-CCNC: 18 U/L (ref 9–39)
BILIRUB DIRECT SERPL-MCNC: 0.1 MG/DL (ref 0–0.3)
BILIRUB SERPL-MCNC: 0.4 MG/DL (ref 0–1.2)
PROT SERPL-MCNC: 7.5 G/DL (ref 6.4–8.2)

## 2024-01-19 PROCEDURE — 3074F SYST BP LT 130 MM HG: CPT | Performed by: FAMILY MEDICINE

## 2024-01-19 PROCEDURE — 80076 HEPATIC FUNCTION PANEL: CPT

## 2024-01-19 PROCEDURE — 1159F MED LIST DOCD IN RCRD: CPT | Performed by: FAMILY MEDICINE

## 2024-01-19 PROCEDURE — 1126F AMNT PAIN NOTED NONE PRSNT: CPT | Performed by: FAMILY MEDICINE

## 2024-01-19 PROCEDURE — 1036F TOBACCO NON-USER: CPT | Performed by: FAMILY MEDICINE

## 2024-01-19 PROCEDURE — 99214 OFFICE O/P EST MOD 30 MIN: CPT | Performed by: FAMILY MEDICINE

## 2024-01-19 PROCEDURE — 3078F DIAST BP <80 MM HG: CPT | Performed by: FAMILY MEDICINE

## 2024-01-19 PROCEDURE — 36415 COLL VENOUS BLD VENIPUNCTURE: CPT

## 2024-01-19 PROCEDURE — 1160F RVW MEDS BY RX/DR IN RCRD: CPT | Performed by: FAMILY MEDICINE

## 2024-01-19 RX ORDER — LISINOPRIL 20 MG/1
20 TABLET ORAL DAILY
Qty: 90 TABLET | Refills: 1 | Status: SHIPPED | OUTPATIENT
Start: 2024-01-19 | End: 2024-04-22 | Stop reason: SDUPTHER

## 2024-01-19 RX ORDER — ALBUTEROL SULFATE 90 UG/1
1 AEROSOL, METERED RESPIRATORY (INHALATION) EVERY 4 HOURS PRN
Qty: 18 G | Refills: 3 | Status: SHIPPED | OUTPATIENT
Start: 2024-01-19 | End: 2024-04-18

## 2024-01-19 NOTE — ASSESSMENT & PLAN NOTE
>>ASSESSMENT AND PLAN FOR EXTRINSIC ASTHMA WITHOUT COMPLICATION ([facility]) WRITTEN ON 1/19/2024  1:22 PM BY STEPHAN HIRSCH PLA, DO    Stable  Refilling medication at same dose.  Recheck in 3 months    >>ASSESSMENT AND PLAN FOR BRONCHIECTASIS, UNCOMPLICATED (MULTI) WRITTEN ON 1/19/2024  1:22 PM BY STEPHAN HIRSCH PLA, DO    Stable  Continue to monitor

## 2024-01-19 NOTE — H&P (VIEW-ONLY)
"Subjective   Patient ID: Reta Davis is a 63 y.o. female who presents for Follow-up (3 mth med check).    Here for general check and med refill.    In general doing well.  Holidays went well.  Taking medications daily for hypertension, anxiety and asthma.  Stable on meds.    Still has not completed her 3-year colonoscopy, scheduled for Monday.     Last labs in November showed elevated cholesterol numbers with elevated 10-year risk score.  Coronary CT checked in October 2021 with a score of 0  Elevation and liver function testing.  Blood counts and electrolytes normal.    Diet is ok, watching  No red meat or dairy  Plant based butter  Increased candy and pastries over the holidays  Walking for exercise until cold almost a mile a day    Breathing is stable   Using MDI and ding well  Nasal drainage is resolved  Used [place] pot and resolved    Seeing phong in a few weeks  Emotions are stable.          Review of Systems    Objective   /74   Pulse 94   Temp 36.9 °C (98.4 °F)   Resp 16   Ht 1.651 m (5' 5\")   Wt 64.6 kg (142 lb 6.4 oz)   LMP 01/01/2008 (Approximate)   SpO2 96%   BMI 23.70 kg/m²     Physical Exam  Vitals and nursing note reviewed.   Constitutional:       Appearance: Normal appearance.   Cardiovascular:      Rate and Rhythm: Normal rate and regular rhythm.   Pulmonary:      Effort: Pulmonary effort is normal.      Breath sounds: Normal breath sounds.   Musculoskeletal:      Cervical back: Normal range of motion.   Neurological:      Mental Status: She is alert.   Psychiatric:         Mood and Affect: Mood normal.         Behavior: Behavior normal.         Thought Content: Thought content normal.         Judgment: Judgment normal.         Assessment/Plan   Problem List Items Addressed This Visit             ICD-10-CM    Extrinsic asthma without complication J45.909     Stable  Refilling medication at same dose.  Recheck in 3 months         Relevant Medications    albuterol 90 mcg/actuation " inhaler    Benign essential hypertension - Primary I10     Stable  Refilling medication at same dose.  Reviewed all labs.  Recheck in 3 months         Relevant Medications    lisinopril 20 mg tablet    Hyperlipidemia, mild E78.5     Uncontrolled  Reviewed diet.  Reviewed previous coronary calcium score of 0.  Presently has elevated LFTs, will hold off on statin.  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  Continue present meds.  Continue care per behavioral health         Bronchiectasis, uncomplicated (CMS/HCC) J47.9     Stable  Continue to monitor          Other Visit Diagnoses         Codes    Elevated LFTs     R79.89    Recheck levels and treat accordingly.  Consider ultrasound of liver if remains elevated.  Consider change in antidepressant     Relevant Orders    Hepatic function panel

## 2024-01-19 NOTE — PROGRESS NOTES
"Subjective   Patient ID: Reta Davis is a 63 y.o. female who presents for Follow-up (3 mth med check).    Here for general check and med refill.    In general doing well.  Holidays went well.  Taking medications daily for hypertension, anxiety and asthma.  Stable on meds.    Still has not completed her 3-year colonoscopy, scheduled for Monday.     Last labs in November showed elevated cholesterol numbers with elevated 10-year risk score.  Coronary CT checked in October 2021 with a score of 0  Elevation and liver function testing.  Blood counts and electrolytes normal.    Diet is ok, watching  No red meat or dairy  Plant based butter  Increased candy and pastries over the holidays  Walking for exercise until cold almost a mile a day    Breathing is stable   Using MDI and ding well  Nasal drainage is resolved  Used [place] pot and resolved    Seeing phong in a few weeks  Emotions are stable.          Review of Systems    Objective   /74   Pulse 94   Temp 36.9 °C (98.4 °F)   Resp 16   Ht 1.651 m (5' 5\")   Wt 64.6 kg (142 lb 6.4 oz)   LMP 01/01/2008 (Approximate)   SpO2 96%   BMI 23.70 kg/m²     Physical Exam  Vitals and nursing note reviewed.   Constitutional:       Appearance: Normal appearance.   Cardiovascular:      Rate and Rhythm: Normal rate and regular rhythm.   Pulmonary:      Effort: Pulmonary effort is normal.      Breath sounds: Normal breath sounds.   Musculoskeletal:      Cervical back: Normal range of motion.   Neurological:      Mental Status: She is alert.   Psychiatric:         Mood and Affect: Mood normal.         Behavior: Behavior normal.         Thought Content: Thought content normal.         Judgment: Judgment normal.         Assessment/Plan   Problem List Items Addressed This Visit             ICD-10-CM    Extrinsic asthma without complication J45.909     Stable  Refilling medication at same dose.  Recheck in 3 months         Relevant Medications    albuterol 90 mcg/actuation " inhaler    Benign essential hypertension - Primary I10     Stable  Refilling medication at same dose.  Reviewed all labs.  Recheck in 3 months         Relevant Medications    lisinopril 20 mg tablet    Hyperlipidemia, mild E78.5     Uncontrolled  Reviewed diet.  Reviewed previous coronary calcium score of 0.  Presently has elevated LFTs, will hold off on statin.  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  Continue present meds.  Continue care per behavioral health         Bronchiectasis, uncomplicated (CMS/HCC) J47.9     Stable  Continue to monitor          Other Visit Diagnoses         Codes    Elevated LFTs     R79.89    Recheck levels and treat accordingly.  Consider ultrasound of liver if remains elevated.  Consider change in antidepressant     Relevant Orders    Hepatic function panel

## 2024-01-19 NOTE — ASSESSMENT & PLAN NOTE
Uncontrolled  Reviewed diet.  Reviewed previous coronary calcium score of 0.  Presently has elevated LFTs, will hold off on statin.  Recheck in 3 months

## 2024-01-29 ENCOUNTER — HOSPITAL ENCOUNTER (OUTPATIENT)
Dept: GASTROENTEROLOGY | Facility: HOSPITAL | Age: 64
Discharge: HOME | End: 2024-01-29
Payer: MEDICARE

## 2024-01-29 ENCOUNTER — ANESTHESIA (OUTPATIENT)
Dept: GASTROENTEROLOGY | Facility: HOSPITAL | Age: 64
End: 2024-01-29
Payer: MEDICARE

## 2024-01-29 ENCOUNTER — ANESTHESIA EVENT (OUTPATIENT)
Dept: GASTROENTEROLOGY | Facility: HOSPITAL | Age: 64
End: 2024-01-29
Payer: MEDICARE

## 2024-01-29 VITALS
DIASTOLIC BLOOD PRESSURE: 69 MMHG | WEIGHT: 140.21 LBS | TEMPERATURE: 97.7 F | RESPIRATION RATE: 14 BRPM | OXYGEN SATURATION: 94 % | SYSTOLIC BLOOD PRESSURE: 148 MMHG | HEART RATE: 83 BPM | BODY MASS INDEX: 23.36 KG/M2 | HEIGHT: 65 IN

## 2024-01-29 DIAGNOSIS — Z12.11 SCREENING FOR MALIGNANT NEOPLASM OF COLON: Primary | ICD-10-CM

## 2024-01-29 DIAGNOSIS — C18.1 PRIMARY CANCER OF APPENDIX (MULTI): ICD-10-CM

## 2024-01-29 PROBLEM — R11.2 PONV (POSTOPERATIVE NAUSEA AND VOMITING): Status: ACTIVE | Noted: 2024-01-29

## 2024-01-29 PROBLEM — Z98.890 PONV (POSTOPERATIVE NAUSEA AND VOMITING): Status: ACTIVE | Noted: 2024-01-29

## 2024-01-29 PROCEDURE — 7100000010 HC PHASE TWO TIME - EACH INCREMENTAL 1 MINUTE

## 2024-01-29 PROCEDURE — 2500000004 HC RX 250 GENERAL PHARMACY W/ HCPCS (ALT 636 FOR OP/ED): Performed by: ANESTHESIOLOGIST ASSISTANT

## 2024-01-29 PROCEDURE — 7100000009 HC PHASE TWO TIME - INITIAL BASE CHARGE

## 2024-01-29 PROCEDURE — A45378 PR COLONOSCOPY,DIAGNOSTIC: Performed by: ANESTHESIOLOGY

## 2024-01-29 PROCEDURE — 45378 DIAGNOSTIC COLONOSCOPY: CPT | Performed by: INTERNAL MEDICINE

## 2024-01-29 PROCEDURE — A45378 PR COLONOSCOPY,DIAGNOSTIC: Performed by: ANESTHESIOLOGIST ASSISTANT

## 2024-01-29 PROCEDURE — 3700000001 HC GENERAL ANESTHESIA TIME - INITIAL BASE CHARGE

## 2024-01-29 PROCEDURE — 2500000004 HC RX 250 GENERAL PHARMACY W/ HCPCS (ALT 636 FOR OP/ED): Performed by: INTERNAL MEDICINE

## 2024-01-29 PROCEDURE — 3700000002 HC GENERAL ANESTHESIA TIME - EACH INCREMENTAL 1 MINUTE

## 2024-01-29 PROCEDURE — G0105 COLORECTAL SCRN; HI RISK IND: HCPCS | Performed by: INTERNAL MEDICINE

## 2024-01-29 RX ORDER — PROPOFOL 10 MG/ML
INJECTION, EMULSION INTRAVENOUS CONTINUOUS PRN
Status: DISCONTINUED | OUTPATIENT
Start: 2024-01-29 | End: 2024-01-29

## 2024-01-29 RX ORDER — MIDAZOLAM HYDROCHLORIDE 1 MG/ML
INJECTION INTRAMUSCULAR; INTRAVENOUS AS NEEDED
Status: DISCONTINUED | OUTPATIENT
Start: 2024-01-29 | End: 2024-01-29

## 2024-01-29 RX ORDER — FENTANYL CITRATE 50 UG/ML
INJECTION, SOLUTION INTRAMUSCULAR; INTRAVENOUS AS NEEDED
Status: DISCONTINUED | OUTPATIENT
Start: 2024-01-29 | End: 2024-01-29

## 2024-01-29 RX ORDER — SODIUM CHLORIDE, SODIUM LACTATE, POTASSIUM CHLORIDE, CALCIUM CHLORIDE 600; 310; 30; 20 MG/100ML; MG/100ML; MG/100ML; MG/100ML
20 INJECTION, SOLUTION INTRAVENOUS CONTINUOUS
Status: DISCONTINUED | OUTPATIENT
Start: 2024-01-29 | End: 2024-01-30 | Stop reason: HOSPADM

## 2024-01-29 RX ADMIN — FENTANYL CITRATE 50 MCG: 50 INJECTION, SOLUTION INTRAMUSCULAR; INTRAVENOUS at 07:30

## 2024-01-29 RX ADMIN — MIDAZOLAM HYDROCHLORIDE 2 MG: 1 INJECTION INTRAMUSCULAR; INTRAVENOUS at 07:26

## 2024-01-29 RX ADMIN — SODIUM CHLORIDE, POTASSIUM CHLORIDE, SODIUM LACTATE AND CALCIUM CHLORIDE: 600; 310; 30; 20 INJECTION, SOLUTION INTRAVENOUS at 07:01

## 2024-01-29 RX ADMIN — PROPOFOL 100 MCG/KG/MIN: 10 INJECTION, EMULSION INTRAVENOUS at 07:35

## 2024-01-29 ASSESSMENT — PAIN SCALES - GENERAL
PAINLEVEL_OUTOF10: 0 - NO PAIN

## 2024-01-29 ASSESSMENT — PAIN - FUNCTIONAL ASSESSMENT
PAIN_FUNCTIONAL_ASSESSMENT: 0-10

## 2024-01-29 ASSESSMENT — COLUMBIA-SUICIDE SEVERITY RATING SCALE - C-SSRS
2. HAVE YOU ACTUALLY HAD ANY THOUGHTS OF KILLING YOURSELF?: NO
6. HAVE YOU EVER DONE ANYTHING, STARTED TO DO ANYTHING, OR PREPARED TO DO ANYTHING TO END YOUR LIFE?: NO
1. IN THE PAST MONTH, HAVE YOU WISHED YOU WERE DEAD OR WISHED YOU COULD GO TO SLEEP AND NOT WAKE UP?: NO

## 2024-01-29 NOTE — ANESTHESIA POSTPROCEDURE EVALUATION
Patient: Reta Davis    Procedure Summary       Date: 01/29/24 Room / Location: [facility]    Anesthesia Start: 0733 Anesthesia Stop: 0756    Procedure: COLONOSCOPY Diagnosis:       Primary cancer of appendix (CMS/HCC)      Screening for malignant neoplasm of colon    Scheduled Providers: Chinmay Rosenthal DO; Cipriano Obrien MD; ALFREDO Resendiz Responsible Provider: Cipriano Obrien MD    Anesthesia Type: MAC ASA Status: 3            Anesthesia Type: MAC    Vitals Value Taken Time   /69 01/29/24 0824   Temp 36.5 °C (97.7 °F) 01/29/24 0754   Pulse 83 01/29/24 0824   Resp 14 01/29/24 0824   SpO2 94 % 01/29/24 0824       Anesthesia Post Evaluation    Patient location during evaluation: PACU  Patient participation: complete - patient participated  Level of consciousness: awake and alert  Pain management: adequate  Airway patency: patent  Cardiovascular status: acceptable and hemodynamically stable  Respiratory status: acceptable, spontaneous ventilation and nonlabored ventilation  Hydration status: acceptable  Postoperative Nausea and Vomiting: none        There were no known notable events for this encounter.

## 2024-01-29 NOTE — NURSING NOTE
0754: Patient to bay with anesthesia and surgical team present. Handoff report and plan of care reviewed, all questions answered. VSS.    0758: Dr Rosenthal at bedside    0800: Family at bedside    0805: Pt tolerating sips of apple juice at this time    0815: Discharge instructions reviewed with patient and family, no further questions at this time.     0825: Peripheral IV removed with no complications.     0828: Patient dressed with family assistance.     0848: Dr Rosenthal at bedside    0850: Patient to main lobby via transport with all belongings in stable condition. Phase 2 complete.

## 2024-01-29 NOTE — DISCHARGE INSTRUCTIONS

## 2024-01-29 NOTE — ANESTHESIA PREPROCEDURE EVALUATION
Patient: Reta Davis    Procedure Information       Date/Time: 01/29/24 0730    Scheduled providers: Chinmay Rosenthal DO; Cipriano Obrien MD; ALFREDO Resendiz    Procedure: COLONOSCOPY    Location: [facility]            Relevant Problems   Anesthesia   (+) PONV (postoperative nausea and vomiting)      Cardiovascular   (+) Benign essential hypertension   (+) Hyperlipidemia, mild      GI   (+) Primary cancer of appendix (CMS/HCC)      Neuro/Psych   (+) Depression, recurrent (CMS/HCC)   (+) Generalized anxiety disorder      Pulmonary   (+) Extrinsic asthma without complication      GI/Hepatic   (+) Primary cancer of appendix (CMS/HCC)      Eyes, Ears, Nose, and Throat   (+) Sensorineural hearing loss, bilateral       Clinical information reviewed:   Tobacco  Allergies  Meds   Med Hx  Surg Hx  OB Status  Fam Hx  Soc   Hx        NPO/Void Status  Carbohydrate Drink Given Prior to Surgery? : N  Date of Last Liquid: 01/29/24  Time of Last Liquid: 0300  Date of Last Solid: 01/28/24  Time of Last Solid: 0800  Last Intake Type: Clear fluids  Time of Last Void: 0656           Past Medical History:   Diagnosis Date    Anxiety     Asthma     Carcinoma of appendix (CMS/HCC)     Depression     Essential (primary) hypertension     Kidney stone 05/25/2023      Past Surgical History:   Procedure Laterality Date    APPENDECTOMY  2017    BREAST BIOPSY      COLONOSCOPY      CYSTOSCOPY INSERTION / REMOVAL STENT / STONE  2021    SINUS SURGERY      TOTAL ABDOMINAL HYSTERECTOMY W/ BILATERAL SALPINGOOPHORECTOMY  07/03/2018    US GUIDED THYROID BIOPSY  11/14/2016    US GUIDED THYROID BIOPSY 11/14/2016 [facility] ANCILLARY LEGACY     Social History     Tobacco Use    Smoking status: Never     Passive exposure: Never    Smokeless tobacco: Never   Vaping Use    Vaping Use: Never used   Substance Use Topics    Alcohol use: Not Currently    Drug use: Not Currently      Current Outpatient Medications   Medication Instructions     "albuterol 90 mcg/actuation inhaler 1 puff, inhalation, Every 4 hours PRN    ascorbic acid (VITAMIN C) 250 mg, oral, Daily    cholecalciferol (VITAMIN D-3) 125 mcg, oral, Daily    lisinopril 20 mg, oral, Daily    montelukast (SINGULAIR) 10 mg, oral, Nightly    multivitamin tablet 1 tablet, oral, Daily    sertraline (ZOLOFT) 100 mg, oral, Daily    sod picosulf-mag ox-citric ac (Clenpiq) 10 mg-3.5 gram- 12 gram/175 mL solution Take one bottle twice as directed by the prep instructions      Allergies   Allergen Reactions    Antihistamine [Diphenhydramine Hcl] Unknown     Pt states \"I have allergy induced asthma and the antihistamines make my saliva really thick\"    Levofloxacin Other and Swelling    Prednisolone Other     thrush    Tetracyclines Rash and Unknown        Chemistry    Lab Results   Component Value Date/Time     11/07/2023 1131    K 3.9 11/07/2023 1131     11/07/2023 1131    CO2 29 11/07/2023 1131    BUN 13 11/07/2023 1131    CREATININE 0.60 11/07/2023 1131    Lab Results   Component Value Date/Time    CALCIUM 9.7 11/07/2023 1131    ALKPHOS 57 01/19/2024 1329    AST 18 01/19/2024 1329    ALT 25 01/19/2024 1329    BILITOT 0.4 01/19/2024 1329          Lab Results   Component Value Date/Time    WBC 6.8 11/07/2023 1131    HGB 13.8 11/07/2023 1131    HCT 43.8 11/07/2023 1131     11/07/2023 1131     Lab Results   Component Value Date/Time    PROTIME 12.1 03/26/2021 1241    INR 1.0 03/26/2021 1241     No results found for this or any previous visit (from the past 4464 hour(s)).  No results found for this or any previous visit from the past 1095 days.       Visit Vitals  /80   Pulse 89   Temp 36.7 °C (98.1 °F) (Tympanic)   Resp 18   Ht 1.651 m (5' 5\")   Wt 63.6 kg (140 lb 3.4 oz)   LMP 01/01/2008 (Approximate)   SpO2 95%   BMI 23.33 kg/m²   OB Status Postmenopausal   Smoking Status Never   BSA 1.71 m²        Physical Exam    Airway  Mallampati: II  TM distance: >3 FB  Neck ROM: full   "   Cardiovascular   Rhythm: regular  Rate: normal     Dental - normal exam     Pulmonary   Breath sounds clear to auscultation     Abdominal - normal exam       Other findings: 2018: Easy mask, mac 3, grade 1           Anesthesia Plan    History of general anesthesia?: yes  History of complications of general anesthesia?: no    ASA 3     MAC     intravenous induction   Anesthetic plan and risks discussed with patient.    Plan discussed with CRNA and CAA.

## 2024-01-30 ASSESSMENT — PAIN SCALES - GENERAL: PAINLEVEL_OUTOF10: 0 - NO PAIN

## 2024-02-05 ENCOUNTER — DOCUMENTATION (OUTPATIENT)
Dept: PRIMARY CARE | Facility: CLINIC | Age: 64
End: 2024-02-05

## 2024-02-05 ENCOUNTER — SOCIAL WORK (OUTPATIENT)
Dept: PRIMARY CARE | Facility: CLINIC | Age: 64
End: 2024-02-05
Payer: MEDICARE

## 2024-02-05 DIAGNOSIS — F41.1 GENERALIZED ANXIETY DISORDER: Primary | ICD-10-CM

## 2024-02-05 PROCEDURE — 99493 SBSQ PSYC COLLAB CARE MGMT: CPT | Performed by: FAMILY MEDICINE

## 2024-02-05 ASSESSMENT — ANXIETY QUESTIONNAIRES
3. WORRYING TOO MUCH ABOUT DIFFERENT THINGS: NOT AT ALL
4. TROUBLE RELAXING: NOT AT ALL
1. FEELING NERVOUS, ANXIOUS, OR ON EDGE: NOT AT ALL
7. FEELING AFRAID AS IF SOMETHING AWFUL MIGHT HAPPEN: NOT AT ALL
IF YOU CHECKED OFF ANY PROBLEMS ON THIS QUESTIONNAIRE, HOW DIFFICULT HAVE THESE PROBLEMS MADE IT FOR YOU TO DO YOUR WORK, TAKE CARE OF THINGS AT HOME, OR GET ALONG WITH OTHER PEOPLE: NOT DIFFICULT AT ALL
5. BEING SO RESTLESS THAT IT IS HARD TO SIT STILL: NOT AT ALL
6. BECOMING EASILY ANNOYED OR IRRITABLE: NOT AT ALL
2. NOT BEING ABLE TO STOP OR CONTROL WORRYING: NOT AT ALL
GAD7 TOTAL SCORE: 0

## 2024-02-05 ASSESSMENT — PATIENT HEALTH QUESTIONNAIRE - PHQ9
4. FEELING TIRED OR HAVING LITTLE ENERGY: NOT AT ALL
6. FEELING BAD ABOUT YOURSELF - OR THAT YOU ARE A FAILURE OR HAVE LET YOURSELF OR YOUR FAMILY DOWN: NOT AT ALL
3. TROUBLE FALLING OR STAYING ASLEEP: NOT AT ALL
1. LITTLE INTEREST OR PLEASURE IN DOING THINGS: NOT AT ALL
SUM OF ALL RESPONSES TO PHQ QUESTIONS 1-9: 0
2. FEELING DOWN, DEPRESSED OR HOPELESS: NOT AT ALL
SUM OF ALL RESPONSES TO PHQ9 QUESTIONS 1 & 2: 0
5. POOR APPETITE OR OVEREATING: NOT AT ALL
10. IF YOU CHECKED OFF ANY PROBLEMS, HOW DIFFICULT HAVE THESE PROBLEMS MADE IT FOR YOU TO DO YOUR WORK, TAKE CARE OF THINGS AT HOME, OR GET ALONG WITH OTHER PEOPLE: NOT DIFFICULT AT ALL
9. THOUGHTS THAT YOU WOULD BE BETTER OFF DEAD, OR OF HURTING YOURSELF: NOT AT ALL
8. MOVING OR SPEAKING SO SLOWLY THAT OTHER PEOPLE COULD HAVE NOTICED. OR THE OPPOSITE, BEING SO FIGETY OR RESTLESS THAT YOU HAVE BEEN MOVING AROUND A LOT MORE THAN USUAL: NOT AT ALL
7. TROUBLE CONCENTRATING ON THINGS, SUCH AS READING THE NEWSPAPER OR WATCHING TELEVISION: NOT AT ALL

## 2024-02-05 NOTE — PROGRESS NOTES
"Collaborative Care (CoCM)  Progress Note    Type of Interaction: In Office    Start Time: 1:00 PM    End Time: 1:44 PM    Appointment: Scheduled    Reason for Visit:   Chief Complaint   Patient presents with    Follow-up      Interval History / Patient Symptoms:     Patient Health Questionnaire-9 Score: 0 (2/5/2024  1:36 PM)  DMITRI-7 Total Score: 0 (2/5/2024  1:36 PM)    Interventions Provided: Review Progress/Termination    Progress Made: Significant    Response to Intervention:   Reta Davis is a 63 y.o. female who presents for follow up for Collaborative Care services. Pt reports she has been doing really well, considering a few changes within the family- her daughter's impending divorce and 's increased anxiety.  Pt reports that she has been \"back to normal\" and has been walking daily, unless the weather is bad, and taking deep breaths.  Pt states that all of her most recent bloodwork and tests have come back normal, and she is not worried about her zoloft causing elevated liver enzymes.  Patient states that she does not want to go off of her medication now that she knows her liver enzymes aren't being affected.  Patient's PHQ and DMITRI have both been stable at 0 for multiple months.    Discussed warning signs that pt is struggling, and identified plan if that happens- to call PCP or M to discuss re-engaging in services.    Plan:   Pt has follow up appointment with PCP in April  Continue to walk/exercise and take deep breaths, self care    Follow Up / Next Appointment:   None scheduled at this time.    "

## 2024-04-09 DIAGNOSIS — F33.9 DEPRESSION, RECURRENT (CMS-HCC): ICD-10-CM

## 2024-04-10 RX ORDER — SERTRALINE HYDROCHLORIDE 50 MG/1
100 TABLET, FILM COATED ORAL DAILY
Qty: 180 TABLET | Refills: 0 | Status: SHIPPED | OUTPATIENT
Start: 2024-04-10 | End: 2024-04-22 | Stop reason: SDUPTHER

## 2024-04-15 ENCOUNTER — APPOINTMENT (OUTPATIENT)
Dept: PRIMARY CARE | Facility: CLINIC | Age: 64
End: 2024-04-15
Payer: MEDICARE

## 2024-04-17 ENCOUNTER — APPOINTMENT (OUTPATIENT)
Dept: PRIMARY CARE | Facility: CLINIC | Age: 64
End: 2024-04-17
Payer: MEDICARE

## 2024-04-18 ENCOUNTER — LAB (OUTPATIENT)
Dept: LAB | Facility: LAB | Age: 64
End: 2024-04-18
Payer: MEDICARE

## 2024-04-18 DIAGNOSIS — I10 BENIGN ESSENTIAL HYPERTENSION: ICD-10-CM

## 2024-04-18 DIAGNOSIS — E78.5 HYPERLIPIDEMIA, MILD: ICD-10-CM

## 2024-04-18 LAB
ALBUMIN SERPL BCP-MCNC: 4.3 G/DL (ref 3.4–5)
ALP SERPL-CCNC: 49 U/L (ref 33–136)
ALT SERPL W P-5'-P-CCNC: 17 U/L (ref 7–45)
ANION GAP SERPL CALC-SCNC: 13 MMOL/L (ref 10–20)
AST SERPL W P-5'-P-CCNC: 16 U/L (ref 9–39)
BASOPHILS # BLD AUTO: 0.05 X10*3/UL (ref 0–0.1)
BASOPHILS NFR BLD AUTO: 0.8 %
BILIRUB SERPL-MCNC: 0.6 MG/DL (ref 0–1.2)
BUN SERPL-MCNC: 11 MG/DL (ref 6–23)
CALCIUM SERPL-MCNC: 9.8 MG/DL (ref 8.6–10.6)
CHLORIDE SERPL-SCNC: 107 MMOL/L (ref 98–107)
CHOLEST SERPL-MCNC: 212 MG/DL (ref 0–199)
CHOLESTEROL/HDL RATIO: 3.5
CO2 SERPL-SCNC: 29 MMOL/L (ref 21–32)
CREAT SERPL-MCNC: 0.67 MG/DL (ref 0.5–1.05)
EGFRCR SERPLBLD CKD-EPI 2021: >90 ML/MIN/1.73M*2
EOSINOPHIL # BLD AUTO: 0.17 X10*3/UL (ref 0–0.7)
EOSINOPHIL NFR BLD AUTO: 2.6 %
ERYTHROCYTE [DISTWIDTH] IN BLOOD BY AUTOMATED COUNT: 12.3 % (ref 11.5–14.5)
GLUCOSE SERPL-MCNC: 96 MG/DL (ref 74–99)
HCT VFR BLD AUTO: 43.1 % (ref 36–46)
HDLC SERPL-MCNC: 60.6 MG/DL
HGB BLD-MCNC: 13.5 G/DL (ref 12–16)
IMM GRANULOCYTES # BLD AUTO: 0.02 X10*3/UL (ref 0–0.7)
IMM GRANULOCYTES NFR BLD AUTO: 0.3 % (ref 0–0.9)
LDLC SERPL CALC-MCNC: 128 MG/DL
LYMPHOCYTES # BLD AUTO: 1.19 X10*3/UL (ref 1.2–4.8)
LYMPHOCYTES NFR BLD AUTO: 17.9 %
MCH RBC QN AUTO: 31.3 PG (ref 26–34)
MCHC RBC AUTO-ENTMCNC: 31.3 G/DL (ref 32–36)
MCV RBC AUTO: 100 FL (ref 80–100)
MONOCYTES # BLD AUTO: 0.53 X10*3/UL (ref 0.1–1)
MONOCYTES NFR BLD AUTO: 8 %
NEUTROPHILS # BLD AUTO: 4.7 X10*3/UL (ref 1.2–7.7)
NEUTROPHILS NFR BLD AUTO: 70.4 %
NON HDL CHOLESTEROL: 151 MG/DL (ref 0–149)
NRBC BLD-RTO: 0 /100 WBCS (ref 0–0)
PLATELET # BLD AUTO: 242 X10*3/UL (ref 150–450)
POTASSIUM SERPL-SCNC: 4.4 MMOL/L (ref 3.5–5.3)
PROT SERPL-MCNC: 7 G/DL (ref 6.4–8.2)
RBC # BLD AUTO: 4.32 X10*6/UL (ref 4–5.2)
SODIUM SERPL-SCNC: 145 MMOL/L (ref 136–145)
TRIGL SERPL-MCNC: 115 MG/DL (ref 0–149)
VLDL: 23 MG/DL (ref 0–40)
WBC # BLD AUTO: 6.7 X10*3/UL (ref 4.4–11.3)

## 2024-04-18 PROCEDURE — 80061 LIPID PANEL: CPT

## 2024-04-18 PROCEDURE — 36415 COLL VENOUS BLD VENIPUNCTURE: CPT

## 2024-04-18 PROCEDURE — 85025 COMPLETE CBC W/AUTO DIFF WBC: CPT

## 2024-04-18 PROCEDURE — 80053 COMPREHEN METABOLIC PANEL: CPT

## 2024-04-22 ENCOUNTER — OFFICE VISIT (OUTPATIENT)
Dept: PRIMARY CARE | Facility: CLINIC | Age: 64
End: 2024-04-22
Payer: MEDICARE

## 2024-04-22 VITALS
BODY MASS INDEX: 23.63 KG/M2 | HEART RATE: 87 BPM | TEMPERATURE: 97 F | SYSTOLIC BLOOD PRESSURE: 138 MMHG | WEIGHT: 142 LBS | OXYGEN SATURATION: 94 % | DIASTOLIC BLOOD PRESSURE: 72 MMHG

## 2024-04-22 DIAGNOSIS — L30.9 HAND ECZEMA: Primary | ICD-10-CM

## 2024-04-22 DIAGNOSIS — E78.5 HYPERLIPIDEMIA, MILD: ICD-10-CM

## 2024-04-22 DIAGNOSIS — F33.9 DEPRESSION, RECURRENT (CMS-HCC): ICD-10-CM

## 2024-04-22 DIAGNOSIS — I10 BENIGN ESSENTIAL HYPERTENSION: ICD-10-CM

## 2024-04-22 PROCEDURE — 99214 OFFICE O/P EST MOD 30 MIN: CPT | Performed by: FAMILY MEDICINE

## 2024-04-22 PROCEDURE — 3078F DIAST BP <80 MM HG: CPT | Performed by: FAMILY MEDICINE

## 2024-04-22 PROCEDURE — 3075F SYST BP GE 130 - 139MM HG: CPT | Performed by: FAMILY MEDICINE

## 2024-04-22 PROCEDURE — 1159F MED LIST DOCD IN RCRD: CPT | Performed by: FAMILY MEDICINE

## 2024-04-22 PROCEDURE — 1126F AMNT PAIN NOTED NONE PRSNT: CPT | Performed by: FAMILY MEDICINE

## 2024-04-22 PROCEDURE — 1160F RVW MEDS BY RX/DR IN RCRD: CPT | Performed by: FAMILY MEDICINE

## 2024-04-22 PROCEDURE — 1123F ACP DISCUSS/DSCN MKR DOCD: CPT | Performed by: FAMILY MEDICINE

## 2024-04-22 PROCEDURE — 1036F TOBACCO NON-USER: CPT | Performed by: FAMILY MEDICINE

## 2024-04-22 RX ORDER — LISINOPRIL 20 MG/1
20 TABLET ORAL DAILY
Qty: 90 TABLET | Refills: 1 | Status: SHIPPED | OUTPATIENT
Start: 2024-04-22

## 2024-04-22 RX ORDER — TRIAMCINOLONE ACETONIDE 1 MG/G
CREAM TOPICAL 2 TIMES DAILY
Qty: 30 G | Refills: 0 | Status: SHIPPED | OUTPATIENT
Start: 2024-04-22

## 2024-04-22 RX ORDER — SERTRALINE HYDROCHLORIDE 50 MG/1
100 TABLET, FILM COATED ORAL DAILY
Qty: 180 TABLET | Refills: 0 | Status: SHIPPED | OUTPATIENT
Start: 2024-04-22

## 2024-04-22 ASSESSMENT — PAIN SCALES - GENERAL: PAINLEVEL: 0-NO PAIN

## 2024-04-22 NOTE — ASSESSMENT & PLAN NOTE
Improving and in remission.  Continue present meds.  Follow up with PCP in 6 months, sooner with any problems or concerns.

## 2024-04-22 NOTE — PROGRESS NOTES
Subjective   Patient ID: Reta Davis is a 64 y.o. female who presents for Follow-up.    HPI    HTN: BP controlled today  Denies CP, SOB, Edema, palpitations or headache.     HYPERLIPIDEMIA: review of recent fasting labs with borderline   Last CT Calcium score = zero ( 10/26/2021)  The 10-year ASCVD risk score (Paul FLORES, et al., 2019) is: 9.5%    Values used to calculate the score:      Age: 64 years      Sex: Female      Is Non- : No      Diabetic: No      Tobacco smoker: No      Systolic Blood Pressure: 138 mmHg      Is BP treated: Yes      HDL Cholesterol: 60.6 mg/dL      Total Cholesterol: 212 mg/dL     DEPRESSION: stable on current Sertraline dosage.  Feeling like her depression is improving and she is able to function more normally.  Sleeping well  Appetite is better and starting to walk with her  to increase her physical activity.  Previously followed by our Behavioral Health Collaborative Care Team but seems to be doing well now that she has graduated from the program and doing well.     HAND ECZEMA:  comes and goes  Using OTC cortisone but not helping as much currently.    Review of Systems    Review of Systems negative except as noted in HPI and Chief complaint.     Objective             VITALS:  /72 (BP Location: Left arm, Patient Position: Sitting, BP Cuff Size: Adult)   Pulse 87   Temp 36.1 °C (97 °F) (Temporal)   Wt 64.4 kg (142 lb)   LMP 01/01/2008 (Approximate)   SpO2 94%   BMI 23.63 kg/m²      Physical Exam  Constitutional:       General: She is not in acute distress.     Appearance: Normal appearance. She is not ill-appearing.   HENT:      Head: Normocephalic and atraumatic.   Neck:      Vascular: No carotid bruit.   Cardiovascular:      Rate and Rhythm: Normal rate and regular rhythm.      Pulses: Normal pulses.      Heart sounds: Normal heart sounds. No murmur heard.     No gallop.   Pulmonary:      Effort: Pulmonary effort is normal.       Breath sounds: Normal breath sounds. No wheezing, rhonchi or rales.   Musculoskeletal:      Cervical back: Normal range of motion and neck supple. No rigidity or tenderness.   Lymphadenopathy:      Cervical: No cervical adenopathy.   Skin:     General: Skin is warm and dry.   Neurological:      Mental Status: She is alert.   Psychiatric:         Mood and Affect: Mood normal.         Behavior: Behavior normal.         Assessment/Plan   Problem List Items Addressed This Visit       Benign essential hypertension     BP Stable today.  Refilling medication at same dose.  Reviewed all labs.  Recheck in 6 months         Relevant Medications    lisinopril 20 mg tablet    Hyperlipidemia, mild     Reviewed recent labs - elevated ASCVD risk discussed.  She would like to continue with diet and exercise modifications for now.  Will repeat fasting labs prior to her next visit with Dr. Jordan in 6 months - we can start a trial of Atorvastatin prior to that visit if she would like.          Relevant Orders    Lipid Panel    Comprehensive metabolic panel    Depression, recurrent (CMS-HCC)     Improving and in remission.  Continue present meds.  Follow up with PCP in 6 months, sooner with any problems or concerns.         Relevant Medications    sertraline (Zoloft) 50 mg tablet    Hand eczema - Primary     Switching to Triamcinolone topical - use for 2 weeks max consecutively and then take 2 week break.  Continue strong moisturizers and gloves as needed  Call or return if worsening.          Relevant Medications    triamcinolone (Kenalog) 0.1 % cream       FOLLOW UP 4-6 MONTHS SOONER WITH ANY PROBLEMS OR CONCERNS.

## 2024-04-22 NOTE — ASSESSMENT & PLAN NOTE
Switching to Triamcinolone topical - use for 2 weeks max consecutively and then take 2 week break.  Continue strong moisturizers and gloves as needed  Call or return if worsening.

## 2024-04-22 NOTE — ASSESSMENT & PLAN NOTE
Reviewed recent labs - elevated ASCVD risk discussed.  She would like to continue with diet and exercise modifications for now.  Will repeat fasting labs prior to her next visit with Dr. Jordan in 6 months - we can start a trial of Atorvastatin prior to that visit if she would like.

## 2024-05-02 ENCOUNTER — LAB (OUTPATIENT)
Dept: LAB | Facility: LAB | Age: 64
End: 2024-05-02
Payer: MEDICARE

## 2024-05-02 ENCOUNTER — TELEPHONE (OUTPATIENT)
Dept: GASTROENTEROLOGY | Facility: CLINIC | Age: 64
End: 2024-05-02
Payer: MEDICARE

## 2024-05-02 DIAGNOSIS — R19.7 DIARRHEA, UNSPECIFIED TYPE: ICD-10-CM

## 2024-05-02 DIAGNOSIS — R19.7 DIARRHEA, UNSPECIFIED TYPE: Primary | ICD-10-CM

## 2024-05-02 PROCEDURE — 87493 C DIFF AMPLIFIED PROBE: CPT

## 2024-05-02 PROCEDURE — 82653 EL-1 FECAL QUANTITATIVE: CPT

## 2024-05-02 NOTE — TELEPHONE ENCOUNTER
Doing OK with chronic liquid stools  Colonoscopy unrevealing   C diff and Pancreatic Elastase ordered    Chinmay Rosenthal, DO

## 2024-05-02 NOTE — TELEPHONE ENCOUNTER
----- Message from Adelia Tapia MA sent at 5/1/2024  1:17 PM EDT -----  Regarding: diarrhea  Hi Dr. Rosenthal     Patient has has explosive diarrhea for 2 weeks. Her PCP is on leave at this time and she is looking for a recommendation.

## 2024-05-03 LAB — C DIF TOX TCDA+TCDB STL QL NAA+PROBE: NOT DETECTED

## 2024-05-06 ENCOUNTER — TELEPHONE (OUTPATIENT)
Dept: GASTROENTEROLOGY | Facility: CLINIC | Age: 64
End: 2024-05-06
Payer: MEDICARE

## 2024-05-06 LAB — ELASTASE PANC STL-MCNT: 452 UG/G

## 2024-05-06 NOTE — TELEPHONE ENCOUNTER
Patient called in and stated diarrhea has improved and she is currently taking a daily probiotic. She should continue probiotic and pastora diet?

## 2024-05-14 ENCOUNTER — TELEPHONE (OUTPATIENT)
Dept: GASTROENTEROLOGY | Facility: CLINIC | Age: 64
End: 2024-05-14
Payer: MEDICARE

## 2024-05-14 NOTE — TELEPHONE ENCOUNTER
Call returned.  Left voice mail.      Chinmay Rosenthal,     ----- Message from Camilla Grayson RN sent at 5/14/2024  9:42 AM EDT -----  Regarding: FW: med question    ----- Message -----  From: Adelia Tapia MA  Sent: 5/13/2024  11:58 AM EDT  To: Camilla Grayson RN  Subject: med question                                     Hi Camilla,  Patient called in and would like to know if her anxiety/ depression medication could have caused her stomach issues.

## 2024-07-17 ENCOUNTER — LAB (OUTPATIENT)
Dept: LAB | Facility: CLINIC | Age: 64
End: 2024-07-17
Payer: MEDICARE

## 2024-07-17 DIAGNOSIS — C18.1 PRIMARY CANCER OF APPENDIX (MULTI): ICD-10-CM

## 2024-07-17 LAB
ALBUMIN SERPL BCP-MCNC: 4.4 G/DL (ref 3.4–5)
ALP SERPL-CCNC: 51 U/L (ref 33–136)
ALT SERPL W P-5'-P-CCNC: 15 U/L (ref 7–45)
ANION GAP SERPL CALC-SCNC: 15 MMOL/L (ref 10–20)
AST SERPL W P-5'-P-CCNC: 17 U/L (ref 9–39)
BASOPHILS # BLD AUTO: 0.04 X10*3/UL (ref 0–0.1)
BASOPHILS NFR BLD AUTO: 0.8 %
BILIRUB SERPL-MCNC: 0.6 MG/DL (ref 0–1.2)
BUN SERPL-MCNC: 15 MG/DL (ref 6–23)
CALCIUM SERPL-MCNC: 10 MG/DL (ref 8.6–10.6)
CANCER AG19-9 SERPL-ACNC: 18.82 U/ML
CEA SERPL-MCNC: 1.1 UG/L
CHLORIDE SERPL-SCNC: 102 MMOL/L (ref 98–107)
CO2 SERPL-SCNC: 30 MMOL/L (ref 21–32)
CREAT SERPL-MCNC: 0.67 MG/DL (ref 0.5–1.05)
EGFRCR SERPLBLD CKD-EPI 2021: >90 ML/MIN/1.73M*2
EOSINOPHIL # BLD AUTO: 0.25 X10*3/UL (ref 0–0.7)
EOSINOPHIL NFR BLD AUTO: 5.2 %
ERYTHROCYTE [DISTWIDTH] IN BLOOD BY AUTOMATED COUNT: 11.8 % (ref 11.5–14.5)
GLUCOSE SERPL-MCNC: 99 MG/DL (ref 74–99)
HCT VFR BLD AUTO: 42.3 % (ref 36–46)
HGB BLD-MCNC: 13.5 G/DL (ref 12–16)
IMM GRANULOCYTES # BLD AUTO: 0.01 X10*3/UL (ref 0–0.7)
IMM GRANULOCYTES NFR BLD AUTO: 0.2 % (ref 0–0.9)
LYMPHOCYTES # BLD AUTO: 1.37 X10*3/UL (ref 1.2–4.8)
LYMPHOCYTES NFR BLD AUTO: 28.3 %
MCH RBC QN AUTO: 31.5 PG (ref 26–34)
MCHC RBC AUTO-ENTMCNC: 31.9 G/DL (ref 32–36)
MCV RBC AUTO: 99 FL (ref 80–100)
MONOCYTES # BLD AUTO: 0.35 X10*3/UL (ref 0.1–1)
MONOCYTES NFR BLD AUTO: 7.2 %
NEUTROPHILS # BLD AUTO: 2.82 X10*3/UL (ref 1.2–7.7)
NEUTROPHILS NFR BLD AUTO: 58.3 %
NRBC BLD-RTO: ABNORMAL /100{WBCS}
PLATELET # BLD AUTO: 227 X10*3/UL (ref 150–450)
POTASSIUM SERPL-SCNC: 4.5 MMOL/L (ref 3.5–5.3)
PROT SERPL-MCNC: 7.1 G/DL (ref 6.4–8.2)
RBC # BLD AUTO: 4.29 X10*6/UL (ref 4–5.2)
SODIUM SERPL-SCNC: 142 MMOL/L (ref 136–145)
WBC # BLD AUTO: 4.8 X10*3/UL (ref 4.4–11.3)

## 2024-07-17 PROCEDURE — 36415 COLL VENOUS BLD VENIPUNCTURE: CPT

## 2024-07-17 PROCEDURE — 82378 CARCINOEMBRYONIC ANTIGEN: CPT | Performed by: NURSE PRACTITIONER

## 2024-07-17 PROCEDURE — 86301 IMMUNOASSAY TUMOR CA 19-9: CPT | Performed by: NURSE PRACTITIONER

## 2024-07-17 PROCEDURE — 84075 ASSAY ALKALINE PHOSPHATASE: CPT | Performed by: NURSE PRACTITIONER

## 2024-07-17 PROCEDURE — 85025 COMPLETE CBC W/AUTO DIFF WBC: CPT

## 2024-07-19 ENCOUNTER — APPOINTMENT (OUTPATIENT)
Dept: HEMATOLOGY/ONCOLOGY | Facility: CLINIC | Age: 64
End: 2024-07-19
Payer: MEDICARE

## 2024-07-23 ENCOUNTER — TELEMEDICINE (OUTPATIENT)
Dept: HEMATOLOGY/ONCOLOGY | Facility: CLINIC | Age: 64
End: 2024-07-23
Payer: MEDICARE

## 2024-07-23 DIAGNOSIS — C18.1 PRIMARY CANCER OF APPENDIX (MULTI): ICD-10-CM

## 2024-07-23 PROCEDURE — 99213 OFFICE O/P EST LOW 20 MIN: CPT | Performed by: NURSE PRACTITIONER

## 2024-07-23 PROCEDURE — 1123F ACP DISCUSS/DSCN MKR DOCD: CPT | Performed by: NURSE PRACTITIONER

## 2024-07-23 NOTE — PROGRESS NOTES
Patient Visit Information:   Telehealth surveillance visit   Visit Type: Follow Up Visit    Pt consented to telephone visit.     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, MMR nl     - 1/18/18 FOLFOX C1        - 2/01/18 C2, 2/15/18 C3,  3/01/18 C4 5FU bolus reduced to 300mg/m2 and oxali 75mg/m2 d/t Sfx),  3/15/18 C5       - delayed 1wk C6 for elevated LFTs, given 4/5  - 4/2018 CT shows [facility] but new portal vein thrombosis      - started on anticoagulation but then had  bleeding so held  - C7  5/3/18 oxaliplatin held for CIPN, bolus at         - C8 5/17/18,  C9 5/31,   - developed PVT 6/2018, decided to move forward with surgery  - 7/3/18 debulking surgery and HIPEC        Current Tx: surveillance  Last Imaging: Nov 2023  Genetics: MMR nl        History of Present Illness:      ID Statement:    NOAM GORDON is a 63 year old Female        Chief Complaint: Appendiceal carcinoma   Interval History:    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 Total abdominal hysterectomy with salpingo/oophrectomay 07/03/18 with intraperitoneal mitomycin. Pathology from surgery negative for malignancy.     Returns for active surveillance - now 6 yrs out from surgical resection   - colonoscopy Jan 2024  - negative, needs repeat in 3 yrs   -Had bout of diarrhea for 2 weeks in April - stool studies normal  -Otherwise is feeling well, without complaints   - Appt good, wt stable.   -Energy level is good.    -No fevers, chills, chest pain, shortness of breath, abdominal pain, nausea, vomiting, rashes or masses.   -ROS as above. Remainder of 10 point review of systems elicited and otherwise unremarkable.          Physical Exam:  deferred - telehealth visit        Lab on 07/17/2024   Component  Date Value Ref Range Status    WBC 07/17/2024 4.8  4.4 - 11.3 x10*3/uL Final    nRBC 07/17/2024    Final    Not Measured    RBC 07/17/2024 4.29  4.00 - 5.20 x10*6/uL Final    Hemoglobin 07/17/2024 13.5  12.0 - 16.0 g/dL Final    Hematocrit 07/17/2024 42.3  36.0 - 46.0 % Final    MCV 07/17/2024 99  80 - 100 fL Final    MCH 07/17/2024 31.5  26.0 - 34.0 pg Final    MCHC 07/17/2024 31.9 (L)  32.0 - 36.0 g/dL Final    RDW 07/17/2024 11.8  11.5 - 14.5 % Final    Platelets 07/17/2024 227  150 - 450 x10*3/uL Final    Neutrophils % 07/17/2024 58.3  40.0 - 80.0 % Final    Immature Granulocytes %, Automated 07/17/2024 0.2  0.0 - 0.9 % Final    Immature Granulocyte Count (IG) includes promyelocytes, myelocytes and metamyelocytes but does not include bands. Percent differential counts (%) should be interpreted in the context of the absolute cell counts (cells/UL).    Lymphocytes % 07/17/2024 28.3  13.0 - 44.0 % Final    Monocytes % 07/17/2024 7.2  2.0 - 10.0 % Final    Eosinophils % 07/17/2024 5.2  0.0 - 6.0 % Final    Basophils % 07/17/2024 0.8  0.0 - 2.0 % Final    Neutrophils Absolute 07/17/2024 2.82  1.20 - 7.70 x10*3/uL Final    Percent differential counts (%) should be interpreted in the context of the absolute cell counts (cells/uL).    Immature Granulocytes Absolute, Au* 07/17/2024 0.01  0.00 - 0.70 x10*3/uL Final    Lymphocytes Absolute 07/17/2024 1.37  1.20 - 4.80 x10*3/uL Final    Monocytes Absolute 07/17/2024 0.35  0.10 - 1.00 x10*3/uL Final    Eosinophils Absolute 07/17/2024 0.25  0.00 - 0.70 x10*3/uL Final    Basophils Absolute 07/17/2024 0.04  0.00 - 0.10 x10*3/uL Final    Glucose 07/17/2024 99  74 - 99 mg/dL Final    Sodium 07/17/2024 142  136 - 145 mmol/L Final    Potassium 07/17/2024 4.5  3.5 - 5.3 mmol/L Final    Chloride 07/17/2024 102  98 - 107 mmol/L Final    Bicarbonate 07/17/2024 30  21 - 32 mmol/L Final    Anion Gap 07/17/2024 15  10 - 20 mmol/L Final    Urea Nitrogen 07/17/2024 15  6 - 23 mg/dL Final     Creatinine 07/17/2024 0.67  0.50 - 1.05 mg/dL Final    eGFR 07/17/2024 >90  >60 mL/min/1.73m*2 Final    Calculations of estimated GFR are performed using the 2021 CKD-EPI Study Refit equation without the race variable for the IDMS-Traceable creatinine methods.  https://jasn.asnjournals.org/content/early/2021/09/22/ASN.5683497219    Calcium 07/17/2024 10.0  8.6 - 10.6 mg/dL Final    Albumin 07/17/2024 4.4  3.4 - 5.0 g/dL Final    Alkaline Phosphatase 07/17/2024 51  33 - 136 U/L Final    Total Protein 07/17/2024 7.1  6.4 - 8.2 g/dL Final    AST 07/17/2024 17  9 - 39 U/L Final    Bilirubin, Total 07/17/2024 0.6  0.0 - 1.2 mg/dL Final    ALT 07/17/2024 15  7 - 45 U/L Final    Patients treated with Sulfasalazine may generate falsely decreased results for ALT.    Carcinoembryonic AG 07/17/2024 1.1  ug/L Final    Cancer AG 19-9 07/17/2024 18.82  <35.00 U/mL Final    Assessment and Plan:      IMPRESSION: CT - 11/10/23   Appendiceal cancer restaging exam:  1. Status post partial right hemicolectomy with no locoregional  recurrence or distant metastatic disease.  2. Bibasilar bronchiectasis with chronic peribronchial wall  thickening, bibasilar tree-in-bud nodularity, and slight worsening in  bibasilar mucous plugging and left lower lobe atelectasis. Correlate  with bronchiolitis.    Assessment and Plan:   Assessment:    Mrs. Davis is a 65 y/o woman with met appendiceal cancer, s/p surgery for appendectomy. 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  No need for imaging or tumor markers after 5 yrs per NCCN guidelines.     # Preventative medicine:   - Has established care with PCP, following with them for HTN and asthma/ sob      #Anxiety - improved     weaning zoloft     # Hx of Portal Vein Thrombus:   - No longer on rivaroxaban due to hematuria and bleeding  - s/p lovenox x 1yr  - CT with resolution

## 2024-09-30 ENCOUNTER — LAB (OUTPATIENT)
Dept: LAB | Facility: LAB | Age: 64
End: 2024-09-30
Payer: MEDICARE

## 2024-09-30 DIAGNOSIS — E78.5 HYPERLIPIDEMIA, MILD: ICD-10-CM

## 2024-09-30 LAB
ALBUMIN SERPL BCP-MCNC: 4.4 G/DL (ref 3.4–5)
ALP SERPL-CCNC: 60 U/L (ref 33–136)
ALT SERPL W P-5'-P-CCNC: 15 U/L (ref 7–45)
ANION GAP SERPL CALC-SCNC: 12 MMOL/L (ref 10–20)
AST SERPL W P-5'-P-CCNC: 17 U/L (ref 9–39)
BILIRUB SERPL-MCNC: 0.6 MG/DL (ref 0–1.2)
BUN SERPL-MCNC: 12 MG/DL (ref 6–23)
CALCIUM SERPL-MCNC: 10 MG/DL (ref 8.6–10.6)
CHLORIDE SERPL-SCNC: 104 MMOL/L (ref 98–107)
CHOLEST SERPL-MCNC: 233 MG/DL (ref 0–199)
CHOLESTEROL/HDL RATIO: 3.8
CO2 SERPL-SCNC: 31 MMOL/L (ref 21–32)
CREAT SERPL-MCNC: 0.68 MG/DL (ref 0.5–1.05)
EGFRCR SERPLBLD CKD-EPI 2021: >90 ML/MIN/1.73M*2
GLUCOSE SERPL-MCNC: 93 MG/DL (ref 74–99)
HDLC SERPL-MCNC: 61.4 MG/DL
LDLC SERPL CALC-MCNC: 150 MG/DL
NON HDL CHOLESTEROL: 172 MG/DL (ref 0–149)
POTASSIUM SERPL-SCNC: 5.1 MMOL/L (ref 3.5–5.3)
PROT SERPL-MCNC: 7.3 G/DL (ref 6.4–8.2)
SODIUM SERPL-SCNC: 142 MMOL/L (ref 136–145)
TRIGL SERPL-MCNC: 107 MG/DL (ref 0–149)
VLDL: 21 MG/DL (ref 0–40)

## 2024-09-30 PROCEDURE — 36415 COLL VENOUS BLD VENIPUNCTURE: CPT

## 2024-09-30 PROCEDURE — 80061 LIPID PANEL: CPT

## 2024-09-30 PROCEDURE — 80053 COMPREHEN METABOLIC PANEL: CPT

## 2024-10-04 ENCOUNTER — HOSPITAL ENCOUNTER (OUTPATIENT)
Dept: RADIOLOGY | Facility: CLINIC | Age: 64
Discharge: HOME | End: 2024-10-04
Payer: MEDICARE

## 2024-10-04 DIAGNOSIS — Z00.00 HEALTHCARE MAINTENANCE: ICD-10-CM

## 2024-10-04 PROCEDURE — 77067 SCR MAMMO BI INCL CAD: CPT

## 2024-10-07 ENCOUNTER — APPOINTMENT (OUTPATIENT)
Dept: PRIMARY CARE | Facility: CLINIC | Age: 64
End: 2024-10-07
Payer: MEDICARE

## 2024-10-07 VITALS
WEIGHT: 151 LBS | DIASTOLIC BLOOD PRESSURE: 78 MMHG | SYSTOLIC BLOOD PRESSURE: 134 MMHG | BODY MASS INDEX: 25.16 KG/M2 | OXYGEN SATURATION: 95 % | HEART RATE: 86 BPM | HEIGHT: 65 IN | TEMPERATURE: 98 F

## 2024-10-07 DIAGNOSIS — I10 BENIGN ESSENTIAL HYPERTENSION: ICD-10-CM

## 2024-10-07 DIAGNOSIS — F33.9 DEPRESSION, RECURRENT (CMS-HCC): ICD-10-CM

## 2024-10-07 DIAGNOSIS — F41.1 GENERALIZED ANXIETY DISORDER: ICD-10-CM

## 2024-10-07 DIAGNOSIS — C18.1 PRIMARY CANCER OF APPENDIX (MULTI): ICD-10-CM

## 2024-10-07 DIAGNOSIS — Z00.00 MEDICARE ANNUAL WELLNESS VISIT, SUBSEQUENT: Primary | ICD-10-CM

## 2024-10-07 DIAGNOSIS — J45.20 MILD INTERMITTENT EXTRINSIC ASTHMA WITHOUT COMPLICATION (HHS-HCC): ICD-10-CM

## 2024-10-07 DIAGNOSIS — J45.40 MODERATE PERSISTENT EXTRINSIC ASTHMA WITHOUT COMPLICATION (HHS-HCC): ICD-10-CM

## 2024-10-07 DIAGNOSIS — E78.5 HYPERLIPIDEMIA, MILD: ICD-10-CM

## 2024-10-07 PROCEDURE — 3075F SYST BP GE 130 - 139MM HG: CPT | Performed by: FAMILY MEDICINE

## 2024-10-07 PROCEDURE — G0439 PPPS, SUBSEQ VISIT: HCPCS | Performed by: FAMILY MEDICINE

## 2024-10-07 PROCEDURE — 1126F AMNT PAIN NOTED NONE PRSNT: CPT | Performed by: FAMILY MEDICINE

## 2024-10-07 PROCEDURE — 1160F RVW MEDS BY RX/DR IN RCRD: CPT | Performed by: FAMILY MEDICINE

## 2024-10-07 PROCEDURE — 3008F BODY MASS INDEX DOCD: CPT | Performed by: FAMILY MEDICINE

## 2024-10-07 PROCEDURE — 1170F FXNL STATUS ASSESSED: CPT | Performed by: FAMILY MEDICINE

## 2024-10-07 PROCEDURE — 1159F MED LIST DOCD IN RCRD: CPT | Performed by: FAMILY MEDICINE

## 2024-10-07 PROCEDURE — 99214 OFFICE O/P EST MOD 30 MIN: CPT | Performed by: FAMILY MEDICINE

## 2024-10-07 PROCEDURE — 1158F ADVNC CARE PLAN TLK DOCD: CPT | Performed by: FAMILY MEDICINE

## 2024-10-07 PROCEDURE — 1036F TOBACCO NON-USER: CPT | Performed by: FAMILY MEDICINE

## 2024-10-07 PROCEDURE — 1123F ACP DISCUSS/DSCN MKR DOCD: CPT | Performed by: FAMILY MEDICINE

## 2024-10-07 PROCEDURE — 3078F DIAST BP <80 MM HG: CPT | Performed by: FAMILY MEDICINE

## 2024-10-07 RX ORDER — ALBUTEROL SULFATE 90 UG/1
1 INHALANT RESPIRATORY (INHALATION) EVERY 4 HOURS PRN
Qty: 18 G | Refills: 3 | Status: SHIPPED | OUTPATIENT
Start: 2024-10-07 | End: 2025-01-05

## 2024-10-07 RX ORDER — LISINOPRIL 20 MG/1
20 TABLET ORAL DAILY
Qty: 90 TABLET | Refills: 1 | Status: SHIPPED | OUTPATIENT
Start: 2024-10-07

## 2024-10-07 ASSESSMENT — PATIENT HEALTH QUESTIONNAIRE - PHQ9
1. LITTLE INTEREST OR PLEASURE IN DOING THINGS: NOT AT ALL
SUM OF ALL RESPONSES TO PHQ9 QUESTIONS 1 AND 2: 0
2. FEELING DOWN, DEPRESSED OR HOPELESS: NOT AT ALL
SUM OF ALL RESPONSES TO PHQ9 QUESTIONS 1 AND 2: 0
1. LITTLE INTEREST OR PLEASURE IN DOING THINGS: NOT AT ALL
2. FEELING DOWN, DEPRESSED OR HOPELESS: NOT AT ALL

## 2024-10-07 ASSESSMENT — ANXIETY QUESTIONNAIRES
1. FEELING NERVOUS, ANXIOUS, OR ON EDGE: NOT AT ALL
2. NOT BEING ABLE TO STOP OR CONTROL WORRYING: NOT AT ALL
3. WORRYING TOO MUCH ABOUT DIFFERENT THINGS: NOT AT ALL
5. BEING SO RESTLESS THAT IT IS HARD TO SIT STILL: NOT AT ALL
IF YOU CHECKED OFF ANY PROBLEMS ON THIS QUESTIONNAIRE, HOW DIFFICULT HAVE THESE PROBLEMS MADE IT FOR YOU TO DO YOUR WORK, TAKE CARE OF THINGS AT HOME, OR GET ALONG WITH OTHER PEOPLE: NOT DIFFICULT AT ALL
4. TROUBLE RELAXING: NOT AT ALL
6. BECOMING EASILY ANNOYED OR IRRITABLE: NOT AT ALL
GAD7 TOTAL SCORE: 0
7. FEELING AFRAID AS IF SOMETHING AWFUL MIGHT HAPPEN: NOT AT ALL

## 2024-10-07 ASSESSMENT — ACTIVITIES OF DAILY LIVING (ADL)
GROCERY_SHOPPING: INDEPENDENT
DRESSING: INDEPENDENT
MANAGING_FINANCES: INDEPENDENT
TAKING_MEDICATION: INDEPENDENT
BATHING: INDEPENDENT
DOING_HOUSEWORK: INDEPENDENT

## 2024-10-07 ASSESSMENT — PAIN SCALES - GENERAL: PAINLEVEL: 0-NO PAIN

## 2024-10-07 ASSESSMENT — ENCOUNTER SYMPTOMS
DEPRESSION: 0
LOSS OF SENSATION IN FEET: 0
OCCASIONAL FEELINGS OF UNSTEADINESS: 0

## 2024-10-07 NOTE — ASSESSMENT & PLAN NOTE
Stable  Reviewed coronary calcium score.  Discussed statin use, patient declines at this time.  Continue diet changes.  Recheck in 6 months

## 2024-10-07 NOTE — ASSESSMENT & PLAN NOTE
Orders:    albuterol 90 mcg/actuation inhaler; Inhale 1 puff every 4 hours if needed for wheezing.

## 2024-10-07 NOTE — PROGRESS NOTES
Subjective   Reason for Visit: Reta Davis is an 64 y.o. female here for a Medicare Wellness visit.     Past Medical, Surgical, and Family History reviewed and updated in chart.    Reviewed all medications by prescribing practitioner or clinical pharmacist (such as prescriptions, OTCs, herbal therapies and supplements) and documented in the medical record.    Here for general check and wellness exam.     Weaned off sertraline  Was having constat diarrhea  Saw Dr Mcmahon and   Had just started a new probiotic  All testing was normal.    Mood is stable off her meds  Does not fell like she needs it.  Taking vit D, multi    Using Mdi more frequently for cough and occ SOB  Breathing clearer I puff weekly occ every other day    Dog sits and feels some related.  Allergies are worse in the winter    Diet is good, son with high cholesterol and needs to change diet  Walking more around track  Wanting to get her treadmil back  Stationary bike   Sleeping well.     Home BP checks are in the lower 130's/70's  Denies chest pain, shortness of breath, lightheaded, dizziness or headaches.     PAP- 11/22 nl   MAMM- 10/04/2024   DEXA 11-   Colon- 1/24   CT cardiac- 10/21 (0)  Flu- Declined             Medicare Wellness Billing Compliance Satisfied    *This is a visual tool to show completion of required items on the day of the visit. Green checks will only appear on the date of visit.    Review all medications by prescribing practitioner or clinical pharmacist (such as prescriptions, OTCs, herbal therapies and supplements) documented in the medical record    Past Medical, Surgical, and Family History reviewed and updated in chart    Tobacco Use Reviewed    Alcohol Use Reviewed    Illicit Drug Use Reviewed    PHQ2/9    Falls in Last Year Reviewed    Home Safety Risk Factors Reviewed    Cognitive Impairment Reviewed    Patient Self Assessment and Health Status    Current Diet Reviewed    Exercise Frequency    ADL -  "Hearing Impairment    ADL - Bathing    ADL - Dressing    ADL - Walks in Home    IADL - Managing Finances    IADL - Grocery Shopping    IADL - Taking Medications    IADL - Doing Housework      Patient Care Team:  Sara Rodriguez Pla, DO as PCP - General  Sara Rodriguez Pla, DO as PCP [facility]     Review of Systems    Objective   Vitals:  /78   Pulse 86   Temp 36.7 °C (98 °F) (Temporal)   Ht 1.651 m (5' 5\")   Wt 68.5 kg (151 lb)   LMP 01/01/2008 (Approximate)   SpO2 95%   BMI 25.13 kg/m²       Physical Exam  Vitals and nursing note reviewed.   Constitutional:       Appearance: Normal appearance.   Cardiovascular:      Rate and Rhythm: Normal rate and regular rhythm.   Pulmonary:      Effort: Pulmonary effort is normal.      Breath sounds: Normal breath sounds.   Musculoskeletal:      Cervical back: Normal range of motion.   Neurological:      Mental Status: She is alert.   Psychiatric:         Mood and Affect: Mood normal.         Behavior: Behavior normal.         Thought Content: Thought content normal.         Judgment: Judgment normal.         Assessment & Plan  Medicare annual wellness visit, subsequent  Health screenings up-to-date.  Declines flu vaccine.  Advanced directives reviewed       Benign essential hypertension  Stable  Refilling meds at same dose.  Reviewed previous labs.  Continue exercise and weight loss efforts.  Recheck in 6 months  Orders:    lisinopril 20 mg tablet; Take 1 tablet (20 mg) by mouth once daily.    Hyperlipidemia, mild  Stable  Reviewed coronary calcium score.  Discussed statin use, patient declines at this time.  Continue diet changes.  Recheck in 6 months       Primary cancer of appendix (Multi)  Stable  Continue care per oncology       Depression, recurrent (CMS-HCC)  Stable without the use of meds.  Continue to monitor       Generalized anxiety disorder  Stable  Recheck in 6 months       Mild intermittent extrinsic asthma without " complication ([facility])  Stable with the as needed use of MDI.  Refilling at same dose.  Return if symptoms worsen       Moderate persistent extrinsic asthma without complication ([facility])    Orders:    albuterol 90 mcg/actuation inhaler; Inhale 1 puff every 4 hours if needed for wheezing.

## 2024-10-07 NOTE — ASSESSMENT & PLAN NOTE
Stable  Refilling meds at same dose.  Reviewed previous labs.  Continue exercise and weight loss efforts.  Recheck in 6 months  Orders:    lisinopril 20 mg tablet; Take 1 tablet (20 mg) by mouth once daily.

## 2025-02-08 ENCOUNTER — HOSPITAL ENCOUNTER (INPATIENT)
Facility: HOSPITAL | Age: 65
End: 2025-02-08
Attending: EMERGENCY MEDICINE | Admitting: INTERNAL MEDICINE
Payer: MEDICARE

## 2025-02-08 ENCOUNTER — APPOINTMENT (OUTPATIENT)
Dept: RADIOLOGY | Facility: HOSPITAL | Age: 65
DRG: 194 | End: 2025-02-08
Payer: MEDICARE

## 2025-02-08 ENCOUNTER — APPOINTMENT (OUTPATIENT)
Dept: CARDIOLOGY | Facility: HOSPITAL | Age: 65
DRG: 194 | End: 2025-02-08
Payer: MEDICARE

## 2025-02-08 DIAGNOSIS — J18.9 PNEUMONIA DUE TO INFECTIOUS ORGANISM, UNSPECIFIED LATERALITY, UNSPECIFIED PART OF LUNG: Primary | ICD-10-CM

## 2025-02-08 LAB
ALBUMIN SERPL BCP-MCNC: 3.6 G/DL (ref 3.4–5)
ALP SERPL-CCNC: 133 U/L (ref 33–136)
ALT SERPL W P-5'-P-CCNC: 266 U/L (ref 7–45)
AMMONIA PLAS-SCNC: 17 UMOL/L (ref 16–53)
ANION GAP SERPL CALC-SCNC: 15 MMOL/L (ref 10–20)
AST SERPL W P-5'-P-CCNC: 336 U/L (ref 9–39)
BASOPHILS # BLD MANUAL: 0 X10*3/UL (ref 0–0.1)
BASOPHILS NFR BLD MANUAL: 0 %
BILIRUB SERPL-MCNC: 0.6 MG/DL (ref 0–1.2)
BUN SERPL-MCNC: 27 MG/DL (ref 6–23)
BURR CELLS BLD QL SMEAR: ABNORMAL
CALCIUM SERPL-MCNC: 9.1 MG/DL (ref 8.6–10.3)
CHLORIDE SERPL-SCNC: 100 MMOL/L (ref 98–107)
CO2 SERPL-SCNC: 29 MMOL/L (ref 21–32)
CREAT SERPL-MCNC: 0.97 MG/DL (ref 0.5–1.05)
D DIMER PPP FEU-MCNC: 872 NG/ML FEU
EGFRCR SERPLBLD CKD-EPI 2021: 65 ML/MIN/1.73M*2
EOSINOPHIL # BLD MANUAL: 0 X10*3/UL (ref 0–0.7)
EOSINOPHIL NFR BLD MANUAL: 0 %
ERYTHROCYTE [DISTWIDTH] IN BLOOD BY AUTOMATED COUNT: 12.9 % (ref 11.5–14.5)
GLUCOSE SERPL-MCNC: 142 MG/DL (ref 74–99)
HCT VFR BLD AUTO: 39.7 % (ref 36–46)
HGB BLD-MCNC: 13 G/DL (ref 12–16)
IMM GRANULOCYTES # BLD AUTO: 0.13 X10*3/UL (ref 0–0.7)
IMM GRANULOCYTES NFR BLD AUTO: 1.6 % (ref 0–0.9)
LYMPHOCYTES # BLD MANUAL: 0.58 X10*3/UL (ref 1.2–4.8)
LYMPHOCYTES NFR BLD MANUAL: 7 %
MCH RBC QN AUTO: 31.1 PG (ref 26–34)
MCHC RBC AUTO-ENTMCNC: 32.7 G/DL (ref 32–36)
MCV RBC AUTO: 95 FL (ref 80–100)
METAMYELOCYTES # BLD MANUAL: 0.08 X10*3/UL
METAMYELOCYTES NFR BLD MANUAL: 1 %
MONOCYTES # BLD MANUAL: 0.33 X10*3/UL (ref 0.1–1)
MONOCYTES NFR BLD MANUAL: 4 %
NEUTROPHILS # BLD MANUAL: 7.23 X10*3/UL (ref 1.2–7.7)
NEUTS BAND # BLD MANUAL: 1.25 X10*3/UL (ref 0–0.7)
NEUTS BAND NFR BLD MANUAL: 15 %
NEUTS SEG # BLD MANUAL: 5.98 X10*3/UL (ref 1.2–7)
NEUTS SEG NFR BLD MANUAL: 72 %
NEUTS VAC BLD QL SMEAR: PRESENT
NRBC BLD-RTO: 0 /100 WBCS (ref 0–0)
PLATELET # BLD AUTO: 212 X10*3/UL (ref 150–450)
POTASSIUM SERPL-SCNC: 3.7 MMOL/L (ref 3.5–5.3)
PROT SERPL-MCNC: 7 G/DL (ref 6.4–8.2)
RBC # BLD AUTO: 4.18 X10*6/UL (ref 4–5.2)
RBC MORPH BLD: ABNORMAL
SODIUM SERPL-SCNC: 140 MMOL/L (ref 136–145)
TOTAL CELLS COUNTED BLD: 100
TOXIC GRANULES BLD QL SMEAR: PRESENT
VARIANT LYMPHS # BLD MANUAL: 0.08 X10*3/UL (ref 0–0.5)
VARIANT LYMPHS NFR BLD: 1 %
WBC # BLD AUTO: 8.3 X10*3/UL (ref 4.4–11.3)

## 2025-02-08 PROCEDURE — 86709 HEPATITIS A IGM ANTIBODY: CPT | Mod: AHULAB | Performed by: INTERNAL MEDICINE

## 2025-02-08 PROCEDURE — 85379 FIBRIN DEGRADATION QUANT: CPT | Performed by: EMERGENCY MEDICINE

## 2025-02-08 PROCEDURE — 99285 EMERGENCY DEPT VISIT HI MDM: CPT | Mod: 25 | Performed by: EMERGENCY MEDICINE

## 2025-02-08 PROCEDURE — 82140 ASSAY OF AMMONIA: CPT | Performed by: EMERGENCY MEDICINE

## 2025-02-08 PROCEDURE — 2500000004 HC RX 250 GENERAL PHARMACY W/ HCPCS (ALT 636 FOR OP/ED): Performed by: EMERGENCY MEDICINE

## 2025-02-08 PROCEDURE — 85007 BL SMEAR W/DIFF WBC COUNT: CPT | Performed by: EMERGENCY MEDICINE

## 2025-02-08 PROCEDURE — 71046 X-RAY EXAM CHEST 2 VIEWS: CPT

## 2025-02-08 PROCEDURE — 93005 ELECTROCARDIOGRAM TRACING: CPT

## 2025-02-08 PROCEDURE — 2500000004 HC RX 250 GENERAL PHARMACY W/ HCPCS (ALT 636 FOR OP/ED): Mod: JZ | Performed by: INTERNAL MEDICINE

## 2025-02-08 PROCEDURE — 71046 X-RAY EXAM CHEST 2 VIEWS: CPT | Performed by: RADIOLOGY

## 2025-02-08 PROCEDURE — 36415 COLL VENOUS BLD VENIPUNCTURE: CPT | Performed by: EMERGENCY MEDICINE

## 2025-02-08 PROCEDURE — 2550000001 HC RX 255 CONTRASTS: Performed by: EMERGENCY MEDICINE

## 2025-02-08 PROCEDURE — 71275 CT ANGIOGRAPHY CHEST: CPT

## 2025-02-08 PROCEDURE — 96375 TX/PRO/DX INJ NEW DRUG ADDON: CPT

## 2025-02-08 PROCEDURE — 84075 ASSAY ALKALINE PHOSPHATASE: CPT | Performed by: EMERGENCY MEDICINE

## 2025-02-08 PROCEDURE — 2500000002 HC RX 250 W HCPCS SELF ADMINISTERED DRUGS (ALT 637 FOR MEDICARE OP, ALT 636 FOR OP/ED): Performed by: INTERNAL MEDICINE

## 2025-02-08 PROCEDURE — 71275 CT ANGIOGRAPHY CHEST: CPT | Performed by: RADIOLOGY

## 2025-02-08 PROCEDURE — 96365 THER/PROPH/DIAG IV INF INIT: CPT

## 2025-02-08 PROCEDURE — 85027 COMPLETE CBC AUTOMATED: CPT | Performed by: EMERGENCY MEDICINE

## 2025-02-08 PROCEDURE — 80053 COMPREHEN METABOLIC PANEL: CPT | Performed by: EMERGENCY MEDICINE

## 2025-02-08 PROCEDURE — 1200000002 HC GENERAL ROOM WITH TELEMETRY DAILY

## 2025-02-08 RX ORDER — OSELTAMIVIR PHOSPHATE 30 MG/1
30 CAPSULE ORAL 2 TIMES DAILY
Status: DISPENSED | OUTPATIENT
Start: 2025-02-08

## 2025-02-08 RX ORDER — IPRATROPIUM BROMIDE AND ALBUTEROL SULFATE 2.5; .5 MG/3ML; MG/3ML
3 SOLUTION RESPIRATORY (INHALATION) EVERY 2 HOUR PRN
Status: ACTIVE | OUTPATIENT
Start: 2025-02-08

## 2025-02-08 RX ORDER — IPRATROPIUM BROMIDE AND ALBUTEROL SULFATE 2.5; .5 MG/3ML; MG/3ML
3 SOLUTION RESPIRATORY (INHALATION)
Status: DISCONTINUED | OUTPATIENT
Start: 2025-02-08 | End: 2025-02-08

## 2025-02-08 RX ORDER — CEFTRIAXONE 1 G/50ML
1 INJECTION, SOLUTION INTRAVENOUS EVERY 24 HOURS
Status: DISPENSED | OUTPATIENT
Start: 2025-02-09

## 2025-02-08 RX ORDER — POLYETHYLENE GLYCOL 3350 17 G/17G
17 POWDER, FOR SOLUTION ORAL DAILY
Status: DISPENSED | OUTPATIENT
Start: 2025-02-08

## 2025-02-08 RX ORDER — ONDANSETRON HYDROCHLORIDE 2 MG/ML
4 INJECTION, SOLUTION INTRAVENOUS EVERY 6 HOURS PRN
Status: DISCONTINUED | OUTPATIENT
Start: 2025-02-08 | End: 2025-02-09 | Stop reason: SDUPTHER

## 2025-02-08 RX ORDER — TALC
3 POWDER (GRAM) TOPICAL NIGHTLY PRN
Status: DISCONTINUED | OUTPATIENT
Start: 2025-02-08 | End: 2025-02-09 | Stop reason: SDUPTHER

## 2025-02-08 RX ORDER — ACETAMINOPHEN 325 MG/1
650 TABLET ORAL EVERY 6 HOURS PRN
Status: DISCONTINUED | OUTPATIENT
Start: 2025-02-08 | End: 2025-02-09

## 2025-02-08 RX ORDER — BENZONATATE 100 MG/1
100 CAPSULE ORAL 3 TIMES DAILY PRN
Status: ACTIVE | OUTPATIENT
Start: 2025-02-08

## 2025-02-08 RX ORDER — PANTOPRAZOLE SODIUM 40 MG/1
40 TABLET, DELAYED RELEASE ORAL
Status: DISCONTINUED | OUTPATIENT
Start: 2025-02-09 | End: 2025-02-09 | Stop reason: SDUPTHER

## 2025-02-08 RX ORDER — ACETAMINOPHEN 160 MG/5ML
500 SUSPENSION ORAL EVERY 4 HOURS PRN
Status: ON HOLD | COMMUNITY

## 2025-02-08 RX ORDER — TRAMADOL HYDROCHLORIDE 50 MG/1
50 TABLET ORAL EVERY 6 HOURS PRN
Status: ACTIVE | OUTPATIENT
Start: 2025-02-08

## 2025-02-08 RX ADMIN — AZITHROMYCIN MONOHYDRATE 500 MG: 500 INJECTION, POWDER, LYOPHILIZED, FOR SOLUTION INTRAVENOUS at 18:21

## 2025-02-08 RX ADMIN — CEFTRIAXONE 2 G: 2 INJECTION, POWDER, FOR SOLUTION INTRAMUSCULAR; INTRAVENOUS at 17:42

## 2025-02-08 RX ADMIN — METHYLPREDNISOLONE SODIUM SUCCINATE 40 MG: 40 INJECTION, POWDER, FOR SOLUTION INTRAMUSCULAR; INTRAVENOUS at 22:00

## 2025-02-08 RX ADMIN — IOHEXOL 75 ML: 350 INJECTION, SOLUTION INTRAVENOUS at 16:39

## 2025-02-08 RX ADMIN — OSELTAMAVIR PHOSPHATE 30 MG: 30 CAPSULE ORAL at 22:00

## 2025-02-08 SDOH — SOCIAL STABILITY: SOCIAL INSECURITY: ABUSE: ADULT

## 2025-02-08 SDOH — SOCIAL STABILITY: SOCIAL INSECURITY: WITHIN THE LAST YEAR, HAVE YOU BEEN HUMILIATED OR EMOTIONALLY ABUSED IN OTHER WAYS BY YOUR PARTNER OR EX-PARTNER?: NO

## 2025-02-08 SDOH — ECONOMIC STABILITY: FOOD INSECURITY: WITHIN THE PAST 12 MONTHS, THE FOOD YOU BOUGHT JUST DIDN'T LAST AND YOU DIDN'T HAVE MONEY TO GET MORE.: NEVER TRUE

## 2025-02-08 SDOH — ECONOMIC STABILITY: FOOD INSECURITY: HOW HARD IS IT FOR YOU TO PAY FOR THE VERY BASICS LIKE FOOD, HOUSING, MEDICAL CARE, AND HEATING?: NOT HARD AT ALL

## 2025-02-08 SDOH — ECONOMIC STABILITY: FOOD INSECURITY: WITHIN THE PAST 12 MONTHS, YOU WORRIED THAT YOUR FOOD WOULD RUN OUT BEFORE YOU GOT THE MONEY TO BUY MORE.: NEVER TRUE

## 2025-02-08 SDOH — SOCIAL STABILITY: SOCIAL INSECURITY: WITHIN THE LAST YEAR, HAVE YOU BEEN AFRAID OF YOUR PARTNER OR EX-PARTNER?: NO

## 2025-02-08 SDOH — SOCIAL STABILITY: SOCIAL INSECURITY
WITHIN THE LAST YEAR, HAVE YOU BEEN RAPED OR FORCED TO HAVE ANY KIND OF SEXUAL ACTIVITY BY YOUR PARTNER OR EX-PARTNER?: NO

## 2025-02-08 SDOH — SOCIAL STABILITY: SOCIAL INSECURITY
WITHIN THE LAST YEAR, HAVE YOU BEEN KICKED, HIT, SLAPPED, OR OTHERWISE PHYSICALLY HURT BY YOUR PARTNER OR EX-PARTNER?: NO

## 2025-02-08 SDOH — SOCIAL STABILITY: SOCIAL INSECURITY: WERE YOU ABLE TO COMPLETE ALL THE BEHAVIORAL HEALTH SCREENINGS?: YES

## 2025-02-08 SDOH — SOCIAL STABILITY: SOCIAL INSECURITY: HAS ANYONE EVER THREATENED TO HURT YOUR FAMILY OR YOUR PETS?: NO

## 2025-02-08 SDOH — ECONOMIC STABILITY: HOUSING INSECURITY: IN THE LAST 12 MONTHS, WAS THERE A TIME WHEN YOU WERE NOT ABLE TO PAY THE MORTGAGE OR RENT ON TIME?: NO

## 2025-02-08 SDOH — ECONOMIC STABILITY: INCOME INSECURITY: IN THE PAST 12 MONTHS HAS THE ELECTRIC, GAS, OIL, OR WATER COMPANY THREATENED TO SHUT OFF SERVICES IN YOUR HOME?: NO

## 2025-02-08 SDOH — ECONOMIC STABILITY: HOUSING INSECURITY: AT ANY TIME IN THE PAST 12 MONTHS, WERE YOU HOMELESS OR LIVING IN A SHELTER (INCLUDING NOW)?: NO

## 2025-02-08 SDOH — SOCIAL STABILITY: SOCIAL INSECURITY: ARE YOU OR HAVE YOU BEEN THREATENED OR ABUSED PHYSICALLY, EMOTIONALLY, OR SEXUALLY BY ANYONE?: NO

## 2025-02-08 SDOH — ECONOMIC STABILITY: TRANSPORTATION INSECURITY: IN THE PAST 12 MONTHS, HAS LACK OF TRANSPORTATION KEPT YOU FROM MEDICAL APPOINTMENTS OR FROM GETTING MEDICATIONS?: NO

## 2025-02-08 SDOH — SOCIAL STABILITY: SOCIAL INSECURITY: DOES ANYONE TRY TO KEEP YOU FROM HAVING/CONTACTING OTHER FRIENDS OR DOING THINGS OUTSIDE YOUR HOME?: NO

## 2025-02-08 SDOH — SOCIAL STABILITY: SOCIAL INSECURITY: HAVE YOU HAD ANY THOUGHTS OF HARMING ANYONE ELSE?: NO

## 2025-02-08 SDOH — SOCIAL STABILITY: SOCIAL INSECURITY: DO YOU FEEL ANYONE HAS EXPLOITED OR TAKEN ADVANTAGE OF YOU FINANCIALLY OR OF YOUR PERSONAL PROPERTY?: NO

## 2025-02-08 SDOH — ECONOMIC STABILITY: HOUSING INSECURITY: IN THE PAST 12 MONTHS, HOW MANY TIMES HAVE YOU MOVED WHERE YOU WERE LIVING?: 1

## 2025-02-08 SDOH — SOCIAL STABILITY: SOCIAL INSECURITY: DO YOU FEEL UNSAFE GOING BACK TO THE PLACE WHERE YOU ARE LIVING?: NO

## 2025-02-08 SDOH — SOCIAL STABILITY: SOCIAL INSECURITY: ARE THERE ANY APPARENT SIGNS OF INJURIES/BEHAVIORS THAT COULD BE RELATED TO ABUSE/NEGLECT?: NO

## 2025-02-08 SDOH — SOCIAL STABILITY: SOCIAL INSECURITY: HAVE YOU HAD THOUGHTS OF HARMING ANYONE ELSE?: NO

## 2025-02-08 ASSESSMENT — COGNITIVE AND FUNCTIONAL STATUS - GENERAL
WALKING IN HOSPITAL ROOM: A LITTLE
DAILY ACTIVITIY SCORE: 24
MOVING FROM LYING ON BACK TO SITTING ON SIDE OF FLAT BED WITH BEDRAILS: A LITTLE
STANDING UP FROM CHAIR USING ARMS: A LITTLE
TURNING FROM BACK TO SIDE WHILE IN FLAT BAD: A LITTLE
PATIENT BASELINE BEDBOUND: NO
CLIMB 3 TO 5 STEPS WITH RAILING: A LOT
MOVING TO AND FROM BED TO CHAIR: A LITTLE
MOBILITY SCORE: 17

## 2025-02-08 ASSESSMENT — LIFESTYLE VARIABLES
HOW MANY STANDARD DRINKS CONTAINING ALCOHOL DO YOU HAVE ON A TYPICAL DAY: PATIENT DOES NOT DRINK
SKIP TO QUESTIONS 9-10: 1
AUDIT-C TOTAL SCORE: 0
AUDIT-C TOTAL SCORE: 0
HOW OFTEN DO YOU HAVE 6 OR MORE DRINKS ON ONE OCCASION: NEVER
HOW OFTEN DO YOU HAVE A DRINK CONTAINING ALCOHOL: NEVER

## 2025-02-08 ASSESSMENT — ACTIVITIES OF DAILY LIVING (ADL)
DRESSING YOURSELF: INDEPENDENT
GROOMING: INDEPENDENT
LACK_OF_TRANSPORTATION: NO
HEARING - RIGHT EAR: DIFFICULTY WITH NOISE
HEARING - LEFT EAR: DIFFICULTY WITH NOISE
WALKS IN HOME: INDEPENDENT
LACK_OF_TRANSPORTATION: NO
JUDGMENT_ADEQUATE_SAFELY_COMPLETE_DAILY_ACTIVITIES: YES
PATIENT'S MEMORY ADEQUATE TO SAFELY COMPLETE DAILY ACTIVITIES?: YES
TOILETING: INDEPENDENT
BATHING: INDEPENDENT
ADEQUATE_TO_COMPLETE_ADL: YES
FEEDING YOURSELF: INDEPENDENT

## 2025-02-08 ASSESSMENT — PATIENT HEALTH QUESTIONNAIRE - PHQ9
SUM OF ALL RESPONSES TO PHQ9 QUESTIONS 1 & 2: 1
2. FEELING DOWN, DEPRESSED OR HOPELESS: NOT AT ALL
1. LITTLE INTEREST OR PLEASURE IN DOING THINGS: SEVERAL DAYS

## 2025-02-08 ASSESSMENT — PAIN SCALES - GENERAL: PAINLEVEL_OUTOF10: 0 - NO PAIN

## 2025-02-08 NOTE — ED PROVIDER NOTES
HPI   Chief Complaint   Patient presents with    low pulse ox       HPI  Patient is a 64-year-old female with past medical history significant for asthma, remote appendiceal cancer status post surgical resection who presented to the emergency room for hypoxia in the setting of influenza.  According to family she started getting sick around Tuesday with a cough that was productive of yellowish sputum.  She had fevers on and off.  She denies any chest pain or shortness of breath but did have an episode of emesis today and was feeling very tired overall.  She went to urgent care and tested positive for influenza B today.  She was also found to be hypoxic so she was sent for further evaluation.  She normally does not require oxygen supplementation and is now requiring at least 2 L via nasal cannula.  She has not felt short of breath to the point of needing to take breathing treatments at home.  Patient did not have a flu shot this year.  She denies any leg pain or swelling.      PMHx: As above  PSHx: As above  FamilyHx: Sick contacts  SocialHx: Denies  Allergies: Per EMR  Medications: See Medication Reconciliation     ROS  As above otherwise denies      Physical Exam    GENERAL: Awake and Alert, No Acute Distress  HEENT: AT/NC, PERRL, EOMI, Normal Oropharynx, No Signs of Dehydration  NECK: Normal Inspection, No JVD  CARDIOVASCULAR: TRR, No M/R/G  RESPIRATORY: Crackles in the left lower lung base but otherwise CTA Bilaterally, No Wheezes, Rales or Rhonchi, Chest Wall Non-tender  ABDOMEN: Soft, non-tender abdomen, Normal Bowel Sounds, No Distention  BACK: No CVA Tenderness  SKIN: Normal Color, Warm, Dry, No Rashes   EXTREMITIES: Non-Tender, Full ROM, No Pedal Edema  NEURO: A&O x 3, Normal Motor and Sensation, Normal Mood and Affect    Nursing Assessment and Vitals Reviewed    EKG showed normal sinus rhythm at 97 bpm.  There are no significant interval prolongations or ischemic ST changes.  No T wave inversions or axis  deviation.    Medical Decision  Patient is a 64-year-old female with past medical history significant for asthma, remote appendiceal cancer status post surgical resection who presented to the emergency room for hypoxia in the setting of influenza.  According to family she started getting sick around Tuesday with a cough that was productive of yellowish sputum.  She had fevers on and off.  She denies any chest pain or shortness of breath but did have an episode of emesis today and was feeling very tired overall.  She went to urgent care and tested positive for influenza B today.  She was also found to be hypoxic so she was sent for further evaluation.  She normally does not require oxygen supplementation and is now requiring at least 2 L via nasal cannula.  She has not felt short of breath to the point of needing to take breathing treatments at home.  Patient did not have a flu shot this year.  She denies any leg pain or swelling.    On evaluation she is nontoxic and in no acute distress.  She is tachycardic and hypoxic with some crackles in the left lower lung base.  No leg pain or swelling.  Will perform a workup including CT imaging giving her remote history of cancer.  Anticipate admission for new oxygen requirement.    Workup for patient included labs that revealed slight elevation in her ALT and AST.  No emergent electrolyte imbalance.  D-dimer is elevated and CT angio PE is ordered.  She has no leukocytosis or anemia.  Chest x-ray showed left lower lobe pneumonia and emphysematous changes.  CT angio PE showed worsening left lower lobe infiltrate with signs of bronchiectasis as well as new left upper and right lung infiltrates concerning for multifocal pneumonia.  No signs of PE detected.  Patient has remained in stable condition and is receiving antibiotics.  She is admitted for further workup and management.              Patient History   Past Medical History:   Diagnosis Date    Anxiety     Asthma      Carcinoma of appendix (Multi)     Depression     Essential (primary) hypertension     Kidney stone 05/25/2023     Past Surgical History:   Procedure Laterality Date    APPENDECTOMY  2017    BREAST BIOPSY Left 2018    Fibroadenoma    BREAST BIOPSY Left 1990    COLONOSCOPY      CYSTOSCOPY INSERTION / REMOVAL STENT / STONE  2021    SINUS SURGERY      TOTAL ABDOMINAL HYSTERECTOMY W/ BILATERAL SALPINGOOPHORECTOMY  07/03/2018    US GUIDED THYROID BIOPSY  11/14/2016    US GUIDED THYROID BIOPSY 11/14/2016 [facility] ANCILLARY LEGACY     Family History   Problem Relation Name Age of Onset    Breast cancer Mother  63    Heart failure Mother      Hypertension Mother      Stroke Mother      Heart attack Mother      Other (malignant neoplasm of breast) Mother      Heart attack Father      Stroke Father      Hypertension Father      Hypertension Sister       Social History     Tobacco Use    Smoking status: Never     Passive exposure: Never    Smokeless tobacco: Never   Vaping Use    Vaping status: Never Used   Substance Use Topics    Alcohol use: Not Currently    Drug use: Not Currently       Physical Exam   ED Triage Vitals [02/08/25 1509]   Temperature Heart Rate Respirations BP   36.4 °C (97.6 °F) (!) 107 16 121/65      Pulse Ox Temp Source Heart Rate Source Patient Position   (!) 88 % Temporal Monitor Sitting      BP Location FiO2 (%)     Left arm --       Physical Exam      ED Course & MDM   Diagnoses as of 02/08/25 1818   Pneumonia due to infectious organism, unspecified laterality, unspecified part of lung                 No data recorded     [place] Coma Scale Score: 15 (02/08/25 1510 : Edwige Smith, EMT)                           Medical Decision Making      Procedure  Procedures     Shyann Mack MD  02/08/25 1819

## 2025-02-08 NOTE — ED TRIAGE NOTES
Pt tested for flu and was positive. Pt stated feeling sick on Monday and has a low pulse ox here 88% RA.

## 2025-02-08 NOTE — PROGRESS NOTES
"Pharmacy Medication History     Source of Information: PATIENT'S SON    Additional concerns with the patient's PTA list. ~~~~PATIENT HAS DIFFICULTY SWALLOWING AND REQUESTS ANY MEDICATIONS GIVEN BE IN LIQUID FORM IF POSSIBLE~~~    The following updates were made to the Prior to Admission medication list:     Medications ADDED:   ACETAMINOPHEN 160 MG/5 ML  Medications CHANGED:  N/A  Medications REMOVED:   N/A  Medications NOT TAKING:   N/A    Allergy reviewed : Yes    Meds 2 Beds : No    Outpatient pharmacy confirmed and updated in chart : Yes    Pharmacy name: [facility]    The list below reflectives the updated PTA list. Please review each medication in order reconciliation for additional clarification and justification.    Prior to Admission Medications   Prescriptions Last Dose Informant     acetaminophen (Tylenol) 160 mg/5 mL (5 mL) suspension 2/8/2025 at  7:30 AM      Sig: Take 15 mL (480 mg) by mouth every 4 hours if needed for mild pain (1 - 3) or fever (temp greater than 38.0 C).   albuterol 90 mcg/actuation inhaler       Sig: Inhale 1 puff every 4 hours if needed for wheezing.   cholecalciferol (Vitamin D-3) 125 MCG (5000 UT) capsule 2/8/2025 at  8:30 AM      Sig: Take 1 capsule (125 mcg) by mouth once daily.   lisinopril 20 mg tablet 2/8/2025 at  8:30 AM      Sig: Take 1 tablet (20 mg) by mouth once daily.   multivitamin tablet 2/7/2025      Sig: Take 1 tablet by mouth once daily.      Facility-Administered Medications: None       The list below reflectives the updated allergy list. Please review each documented allergy for additional clarification and justification.    Allergies   Allergen Reactions    Antihistamine [Diphenhydramine Hcl] Unknown     Pt states \"I have allergy induced asthma and the antihistamines make my saliva really thick\"    Levofloxacin Other and Swelling    Prednisolone Other     thrush    Tetracyclines Rash and Unknown          02/08/25 at 5:23 PM - Zonia Lazaro     "

## 2025-02-09 ENCOUNTER — APPOINTMENT (OUTPATIENT)
Dept: RADIOLOGY | Facility: HOSPITAL | Age: 65
DRG: 194 | End: 2025-02-09
Payer: MEDICARE

## 2025-02-09 VITALS
DIASTOLIC BLOOD PRESSURE: 67 MMHG | HEART RATE: 88 BPM | SYSTOLIC BLOOD PRESSURE: 124 MMHG | BODY MASS INDEX: 25.16 KG/M2 | OXYGEN SATURATION: 96 % | TEMPERATURE: 97.5 F | WEIGHT: 151 LBS | RESPIRATION RATE: 18 BRPM | HEIGHT: 65 IN

## 2025-02-09 LAB
ANION GAP SERPL CALC-SCNC: 14 MMOL/L (ref 10–20)
APPEARANCE UR: ABNORMAL
BILIRUB UR STRIP.AUTO-MCNC: NEGATIVE MG/DL
BUN SERPL-MCNC: 32 MG/DL (ref 6–23)
CALCIUM SERPL-MCNC: 9.3 MG/DL (ref 8.6–10.3)
CHLORIDE SERPL-SCNC: 99 MMOL/L (ref 98–107)
CO2 SERPL-SCNC: 29 MMOL/L (ref 21–32)
COLOR UR: YELLOW
CREAT SERPL-MCNC: 0.88 MG/DL (ref 0.5–1.05)
EGFRCR SERPLBLD CKD-EPI 2021: 73 ML/MIN/1.73M*2
ERYTHROCYTE [DISTWIDTH] IN BLOOD BY AUTOMATED COUNT: 12.9 % (ref 11.5–14.5)
GLUCOSE SERPL-MCNC: 249 MG/DL (ref 74–99)
GLUCOSE UR STRIP.AUTO-MCNC: ABNORMAL MG/DL
HCT VFR BLD AUTO: 39.1 % (ref 36–46)
HGB BLD-MCNC: 13.1 G/DL (ref 12–16)
HYALINE CASTS #/AREA URNS AUTO: ABNORMAL /LPF
KETONES UR STRIP.AUTO-MCNC: NEGATIVE MG/DL
LEUKOCYTE ESTERASE UR QL STRIP.AUTO: ABNORMAL
MCH RBC QN AUTO: 31.6 PG (ref 26–34)
MCHC RBC AUTO-ENTMCNC: 33.5 G/DL (ref 32–36)
MCV RBC AUTO: 94 FL (ref 80–100)
MIXED CELL CASTS #/AREA UR COMP ASSIST: ABNORMAL /LPF
MUCOUS THREADS #/AREA URNS AUTO: ABNORMAL /LPF
NITRITE UR QL STRIP.AUTO: NEGATIVE
NRBC BLD-RTO: 0 /100 WBCS (ref 0–0)
PH UR STRIP.AUTO: 5.5 [PH]
PLATELET # BLD AUTO: 261 X10*3/UL (ref 150–450)
POTASSIUM SERPL-SCNC: 4.1 MMOL/L (ref 3.5–5.3)
PROT UR STRIP.AUTO-MCNC: ABNORMAL MG/DL
RBC # BLD AUTO: 4.15 X10*6/UL (ref 4–5.2)
RBC # UR STRIP.AUTO: ABNORMAL MG/DL
RBC #/AREA URNS AUTO: ABNORMAL /HPF
SODIUM SERPL-SCNC: 138 MMOL/L (ref 136–145)
SP GR UR STRIP.AUTO: 1.03
SQUAMOUS #/AREA URNS AUTO: ABNORMAL /HPF
TRANS CELLS #/AREA UR COMP ASSIST: ABNORMAL /HPF
UROBILINOGEN UR STRIP.AUTO-MCNC: NORMAL MG/DL
WBC # BLD AUTO: 9.9 X10*3/UL (ref 4.4–11.3)
WBC #/AREA URNS AUTO: ABNORMAL /HPF

## 2025-02-09 PROCEDURE — 87449 NOS EACH ORGANISM AG IA: CPT | Mod: AHULAB

## 2025-02-09 PROCEDURE — 85027 COMPLETE CBC AUTOMATED: CPT | Performed by: INTERNAL MEDICINE

## 2025-02-09 PROCEDURE — 76705 ECHO EXAM OF ABDOMEN: CPT

## 2025-02-09 PROCEDURE — 1200000002 HC GENERAL ROOM WITH TELEMETRY DAILY

## 2025-02-09 PROCEDURE — 80048 BASIC METABOLIC PNL TOTAL CA: CPT | Performed by: INTERNAL MEDICINE

## 2025-02-09 PROCEDURE — 2500000001 HC RX 250 WO HCPCS SELF ADMINISTERED DRUGS (ALT 637 FOR MEDICARE OP): Performed by: INTERNAL MEDICINE

## 2025-02-09 PROCEDURE — 87899 AGENT NOS ASSAY W/OPTIC: CPT | Mod: AHULAB

## 2025-02-09 PROCEDURE — 2500000002 HC RX 250 W HCPCS SELF ADMINISTERED DRUGS (ALT 637 FOR MEDICARE OP, ALT 636 FOR OP/ED): Performed by: INTERNAL MEDICINE

## 2025-02-09 PROCEDURE — 81001 URINALYSIS AUTO W/SCOPE: CPT | Performed by: INTERNAL MEDICINE

## 2025-02-09 PROCEDURE — 99222 1ST HOSP IP/OBS MODERATE 55: CPT | Performed by: INTERNAL MEDICINE

## 2025-02-09 PROCEDURE — 76705 ECHO EXAM OF ABDOMEN: CPT | Performed by: RADIOLOGY

## 2025-02-09 PROCEDURE — 36415 COLL VENOUS BLD VENIPUNCTURE: CPT | Performed by: INTERNAL MEDICINE

## 2025-02-09 PROCEDURE — 2500000004 HC RX 250 GENERAL PHARMACY W/ HCPCS (ALT 636 FOR OP/ED): Performed by: INTERNAL MEDICINE

## 2025-02-09 RX ORDER — PANTOPRAZOLE SODIUM 40 MG/1
40 TABLET, DELAYED RELEASE ORAL
Status: ACTIVE | OUTPATIENT
Start: 2025-02-10

## 2025-02-09 RX ORDER — PANTOPRAZOLE SODIUM 40 MG/10ML
40 INJECTION, POWDER, LYOPHILIZED, FOR SOLUTION INTRAVENOUS
Status: ACTIVE | OUTPATIENT
Start: 2025-02-10

## 2025-02-09 RX ORDER — ONDANSETRON HYDROCHLORIDE 2 MG/ML
4 INJECTION, SOLUTION INTRAVENOUS EVERY 8 HOURS PRN
Status: ACTIVE | OUTPATIENT
Start: 2025-02-09

## 2025-02-09 RX ORDER — ACETAMINOPHEN 160 MG/5ML
650 SOLUTION ORAL EVERY 4 HOURS PRN
Status: ACTIVE | OUTPATIENT
Start: 2025-02-09

## 2025-02-09 RX ORDER — LISINOPRIL 20 MG/1
20 TABLET ORAL DAILY
Status: DISPENSED | OUTPATIENT
Start: 2025-02-09

## 2025-02-09 RX ORDER — TALC
3 POWDER (GRAM) TOPICAL NIGHTLY PRN
Status: ACTIVE | OUTPATIENT
Start: 2025-02-09

## 2025-02-09 RX ORDER — POLYETHYLENE GLYCOL 3350 17 G/17G
17 POWDER, FOR SOLUTION ORAL DAILY PRN
Status: ACTIVE | OUTPATIENT
Start: 2025-02-09

## 2025-02-09 RX ORDER — GUAIFENESIN 600 MG/1
600 TABLET, EXTENDED RELEASE ORAL EVERY 12 HOURS PRN
Status: ACTIVE | OUTPATIENT
Start: 2025-02-09

## 2025-02-09 RX ORDER — ACETAMINOPHEN 650 MG/1
650 SUPPOSITORY RECTAL EVERY 4 HOURS PRN
Status: ACTIVE | OUTPATIENT
Start: 2025-02-09

## 2025-02-09 RX ORDER — ONDANSETRON 4 MG/1
4 TABLET, FILM COATED ORAL EVERY 8 HOURS PRN
Status: ACTIVE | OUTPATIENT
Start: 2025-02-09

## 2025-02-09 RX ORDER — ACETAMINOPHEN 325 MG/1
650 TABLET ORAL EVERY 4 HOURS PRN
Status: ACTIVE | OUTPATIENT
Start: 2025-02-09

## 2025-02-09 RX ORDER — NYSTATIN 100000 [USP'U]/ML
5 SUSPENSION ORAL 3 TIMES DAILY
Status: DISPENSED | OUTPATIENT
Start: 2025-02-09

## 2025-02-09 RX ORDER — ENOXAPARIN SODIUM 100 MG/ML
40 INJECTION SUBCUTANEOUS EVERY 24 HOURS
Status: DISPENSED | OUTPATIENT
Start: 2025-02-09

## 2025-02-09 RX ADMIN — METHYLPREDNISOLONE SODIUM SUCCINATE 40 MG: 40 INJECTION, POWDER, FOR SOLUTION INTRAMUSCULAR; INTRAVENOUS at 10:12

## 2025-02-09 RX ADMIN — LISINOPRIL 20 MG: 20 TABLET ORAL at 10:11

## 2025-02-09 RX ADMIN — PANTOPRAZOLE SODIUM 40 MG: 40 TABLET, DELAYED RELEASE ORAL at 06:03

## 2025-02-09 RX ADMIN — CEFTRIAXONE SODIUM 1 G: 1 INJECTION, SOLUTION INTRAVENOUS at 10:20

## 2025-02-09 RX ADMIN — NYSTATIN 500000 UNITS: 100000 SUSPENSION ORAL at 08:54

## 2025-02-09 RX ADMIN — ENOXAPARIN SODIUM 40 MG: 40 INJECTION SUBCUTANEOUS at 21:02

## 2025-02-09 RX ADMIN — AZITHROMYCIN MONOHYDRATE 500 MG: 500 INJECTION, POWDER, LYOPHILIZED, FOR SOLUTION INTRAVENOUS at 10:57

## 2025-02-09 RX ADMIN — NYSTATIN 500000 UNITS: 100000 SUSPENSION ORAL at 21:02

## 2025-02-09 RX ADMIN — NYSTATIN 500000 UNITS: 100000 SUSPENSION ORAL at 17:42

## 2025-02-09 RX ADMIN — OSELTAMAVIR PHOSPHATE 30 MG: 30 CAPSULE ORAL at 08:55

## 2025-02-09 RX ADMIN — OSELTAMAVIR PHOSPHATE 30 MG: 30 CAPSULE ORAL at 21:02

## 2025-02-09 ASSESSMENT — COGNITIVE AND FUNCTIONAL STATUS - GENERAL
MOVING FROM LYING ON BACK TO SITTING ON SIDE OF FLAT BED WITH BEDRAILS: A LITTLE
MOVING TO AND FROM BED TO CHAIR: A LITTLE
STANDING UP FROM CHAIR USING ARMS: A LITTLE
CLIMB 3 TO 5 STEPS WITH RAILING: A LITTLE
TURNING FROM BACK TO SIDE WHILE IN FLAT BAD: A LITTLE
WALKING IN HOSPITAL ROOM: A LITTLE
DAILY ACTIVITIY SCORE: 24
MOBILITY SCORE: 18

## 2025-02-09 ASSESSMENT — PAIN SCALES - GENERAL
PAINLEVEL_OUTOF10: 0 - NO PAIN
PAINLEVEL_OUTOF10: 0 - NO PAIN

## 2025-02-09 ASSESSMENT — PAIN SCALES - WONG BAKER: WONGBAKER_NUMERICALRESPONSE: NO HURT

## 2025-02-09 NOTE — CARE PLAN
The patient's goals for the shift include      The clinical goals for the shift include PT TO REMAIN HDS    Over the shift, the patient did not make progress toward the following goals. Barriers to progression include n/a. Recommendations to address these barriers include n/a.

## 2025-02-09 NOTE — CARE PLAN
The patient's goals for the shift include  to be free from injury    The clinical goals for the shift include PT TO REMAIN HDS    Over the shift, the patient did  make progress toward the following goals.

## 2025-02-09 NOTE — H&P
Reta Davis is a 64 y.o. female   HPI   Patient with a past medical history of pretension history of appendiceal carcinoma status post omentectomy and chemotherapy was doing well until a few days ago when she started having upper respite tract symptoms  The patient developed a cough with productive phlegm and worsening shortness of breath along with loss of appetite  She went to the urgent care center where she tested positive for the influenza and was also noted to be hypoxic therefore she was advised admission to the hospital    Patient's family members have also been sick with the flu  We also noted elevated LFTs  Patient denies any nausea vomiting or diarrhea    Past Medical History  Past Medical History:   Diagnosis Date    Anxiety     Asthma     Carcinoma of appendix (Multi)     Depression     Essential (primary) hypertension     Kidney stone 05/25/2023       Surgical History  Past Surgical History:   Procedure Laterality Date    APPENDECTOMY  2017    BREAST BIOPSY Left 2018    Fibroadenoma    BREAST BIOPSY Left 1990    COLONOSCOPY      CYSTOSCOPY INSERTION / REMOVAL STENT / STONE  2021    SINUS SURGERY      TOTAL ABDOMINAL HYSTERECTOMY W/ BILATERAL SALPINGOOPHORECTOMY  07/03/2018    US GUIDED THYROID BIOPSY  11/14/2016    US GUIDED THYROID BIOPSY 11/14/2016 [facility] ANCILLARY LEGACY        Social History  She reports that she has never smoked. She has never been exposed to tobacco smoke. She has never used smokeless tobacco. She reports that she does not currently use alcohol. She reports that she does not currently use drugs.    Family History  Family History   Problem Relation Name Age of Onset    Breast cancer Mother  63    Heart failure Mother      Hypertension Mother      Stroke Mother      Heart attack Mother      Other (malignant neoplasm of breast) Mother      Heart attack Father      Stroke Father      Hypertension Father      Hypertension Sister          Allergies  Antihistamine [diphenhydramine hcl],  Levofloxacin, Prednisolone, and Tetracyclines    Review of Systems     Constitutional: Poorly  Eyes: no blurred vision and no diplopia.   ENT: no hearing loss, no tinnitus, no earache, no sore throat, no hoarseness and no swollen glands in the neck.   Cardiovascular: no chest pain, no tightness or heavy pressure, no shortness of breath, no palpitations and no lower extremity edema.   Respiratory: Cough  Gastrointestinal: no change in bowel habits, no diarrhea, no constipation, no bloody stools, no nausea, no vomiting, no abdominal pain, no signs and symptoms of ulcer disease, no andrew colored stools and no intolerance to fatty foods.   Genitourinary: no urinary frequency, no dysuria, no hematuria, no burning sensation during urination, urinary stream is not smaller and urinary stream does not start and stop.   Musculoskeletal: no arthralgias, no joint stiffness, no muscle weakness, no back pain and no difficulty walking.   Skin: no rashes, no change in skin color and pigmentation, no skin lesions and no skin lumps.   Neurological: no headaches, no dizziness, no seizures, no tingling, no numbness, no signs and symptoms of stroke and no limb weakness.   Psychiatric: no confusion, no memory lapses or loss, no depression and no sleep disturbances.   Endocrine: no goiter, no thyroid disorder, no diabetes mellitus, no excessive thirst, no dry skin, no cold intolerance, no heat intolerance and no increased urinary frequency.   Hematologic/Lymphatic: is not slow to heal, does not bleed easily, does not bruise easily, no thrombophlebitis, no anemia and no history of blood transfusion.   All other systems have been reviewed and are negative for complaint.     Vitals:    02/09/25 0900   BP: 119/61   Pulse: 86   Resp:    Temp: 36.4 °C (97.5 °F)   SpO2: 94%        Scheduled medications  azithromycin, 500 mg, intravenous, Daily  cefTRIAXone, 1 g, intravenous, q24h  enoxaparin, 40 mg, subcutaneous, q24h  lisinopril, 20 mg, oral,  Daily  methylPREDNISolone sodium succinate (PF), 40 mg, intravenous, q12h  nystatin, 5 mL, Swish & Swallow, TID  oseltamivir, 30 mg, oral, BID  [START ON 2/10/2025] pantoprazole, 40 mg, oral, Daily before breakfast   Or  [START ON 2/10/2025] pantoprazole, 40 mg, intravenous, Daily before breakfast  polyethylene glycol, 17 g, oral, Daily      Continuous medications     PRN medications  PRN medications: acetaminophen **OR** acetaminophen **OR** acetaminophen, benzonatate, guaiFENesin, ipratropium-albuteroL, melatonin, ondansetron **OR** ondansetron, oxygen, polyethylene glycol, traMADol    Results from last 7 days   Lab Units 02/09/25  1010 02/08/25  1538   WBC AUTO x10*3/uL 9.9 8.3   HEMOGLOBIN g/dL 13.1 13.0   HEMATOCRIT % 39.1 39.7   PLATELETS AUTO x10*3/uL 261 212     Results from last 7 days   Lab Units 02/09/25  1010 02/08/25  1538   SODIUM mmol/L 138 140   POTASSIUM mmol/L 4.1 3.7   CHLORIDE mmol/L 99 100   CO2 mmol/L 29 29   BUN mg/dL 32* 27*   CREATININE mg/dL 0.88 0.97   CALCIUM mg/dL 9.3 9.1   PROTEIN TOTAL g/dL  --  7.0   BILIRUBIN TOTAL mg/dL  --  0.6   ALK PHOS U/L  --  133   ALT U/L  --  266*   AST U/L  --  336*   GLUCOSE mg/dL 249* 142*            CT angio chest for pulmonary embolism   Final Result   1. Interval worsening of left lower lobe infiltrate and left lung   volume loss with associated bronchiectasis when compared to previous   exam.   2. Interval development of left upper lobe and right lung milder   infiltrate as above. Clinical correlation and follow-up recommended   to document resolution.   3. No evidence for acute pulmonary embolic disease.             MACRO:   None        Signed by: Melinda Martins 2/8/2025 5:46 PM   Dictation workstation:   [id number]      XR chest 2 views   Final Result   1. Left basilar airspace disease and small effusion compatible with   pneumonia. Radiographic follow-up to resolution in 2-3 weeks is   advised.   2. Underlying emphysematous changes                   MACRO:   None        Signed by: Andrew Roper 2/8/2025 4:01 PM   Dictation workstation:   [id number]      US abdomen limited liver    (Results Pending)       Physical Exam      Constitutional   General appearance: Alert and in no acute distress.   Eyes   Inspection of eyes: Sclera and conjunctiva were normal.    Pupil exam: Pupils were equal in size. Extraocular movements were intact.   Pulmonary   Respiratory assessment: No respiratory distress, normal respiratory rhythm and effort.    Auscultation of Lungs: Basilar crackles  Cardiovascular   Auscultation of heart: Apical pulse normal, heart rate and rhythm normal, normal S1 and S2, no murmurs and no pericardial rub.    Exam for edema: No peripheral edema.   Abdomen   Abdominal Exam: No bruits, normal bowel sounds, soft, non-tender, no abdominal mass palpated.    Liver and Spleen exam: No hepato-splenomegaly.   Musculoskeletal     Inspection/palpation of joints, bones and muscles: No joint swelling. Normal movement of all extremities.   Skin   Skin inspection: Normal skin color and pigmentation, normal skin turgor and no visible rash.   Neurologic   Cranial nerves: Nerves 2-12 were intact, no focal neuro defects.   Psychiatric   Orientation: Oriented to person, place, and time.    Mood and affect: Normal.      Assessment/Plan      Influenza B  #Pneumonia  #Transaminitis  Continue Tamiflu and antibiotics  Continue DuoNebs  Check Legionella/strep pneumo    Check LFTs tomorrow  Gallbladder/liver ultrasound especially with a history of appendiceal carcinoma in the past    #Hypertension  Stable continue home medications    *Hyperglycemia  From the Solu-Medrol  Patient is minimally wheezy  We will taper off

## 2025-02-10 ENCOUNTER — APPOINTMENT (OUTPATIENT)
Dept: RADIOLOGY | Facility: HOSPITAL | Age: 65
DRG: 194 | End: 2025-02-10
Payer: MEDICARE

## 2025-02-10 LAB
ALBUMIN SERPL BCP-MCNC: 3.3 G/DL (ref 3.4–5)
ALP SERPL-CCNC: 173 U/L (ref 33–136)
ALT SERPL W P-5'-P-CCNC: 771 U/L (ref 7–45)
ANION GAP SERPL CALC-SCNC: 15 MMOL/L (ref 10–20)
AST SERPL W P-5'-P-CCNC: 496 U/L (ref 9–39)
BILIRUB SERPL-MCNC: 0.3 MG/DL (ref 0–1.2)
BUN SERPL-MCNC: 36 MG/DL (ref 6–23)
CALCIUM SERPL-MCNC: 9.2 MG/DL (ref 8.6–10.3)
CHLORIDE SERPL-SCNC: 100 MMOL/L (ref 98–107)
CO2 SERPL-SCNC: 27 MMOL/L (ref 21–32)
CREAT SERPL-MCNC: 0.87 MG/DL (ref 0.5–1.05)
EGFRCR SERPLBLD CKD-EPI 2021: 74 ML/MIN/1.73M*2
ERYTHROCYTE [DISTWIDTH] IN BLOOD BY AUTOMATED COUNT: 12.5 % (ref 11.5–14.5)
EST. AVERAGE GLUCOSE BLD GHB EST-MCNC: 137 MG/DL
GLUCOSE SERPL-MCNC: 245 MG/DL (ref 74–99)
HAV IGM SER QL: NONREACTIVE
HBA1C MFR BLD: 6.4 %
HBV CORE IGM SER QL: NONREACTIVE
HBV SURFACE AG SERPL QL IA: NONREACTIVE
HCT VFR BLD AUTO: 37.5 % (ref 36–46)
HCV AB SER QL: NONREACTIVE
HGB BLD-MCNC: 12.3 G/DL (ref 12–16)
MCH RBC QN AUTO: 31.1 PG (ref 26–34)
MCHC RBC AUTO-ENTMCNC: 32.8 G/DL (ref 32–36)
MCV RBC AUTO: 95 FL (ref 80–100)
NRBC BLD-RTO: 0 /100 WBCS (ref 0–0)
PLATELET # BLD AUTO: 290 X10*3/UL (ref 150–450)
POTASSIUM SERPL-SCNC: 3.9 MMOL/L (ref 3.5–5.3)
PROT SERPL-MCNC: 7.5 G/DL (ref 6.4–8.2)
RBC # BLD AUTO: 3.95 X10*6/UL (ref 4–5.2)
SODIUM SERPL-SCNC: 138 MMOL/L (ref 136–145)
WBC # BLD AUTO: 12.8 X10*3/UL (ref 4.4–11.3)

## 2025-02-10 PROCEDURE — 83036 HEMOGLOBIN GLYCOSYLATED A1C: CPT | Mod: AHULAB | Performed by: INTERNAL MEDICINE

## 2025-02-10 PROCEDURE — 99232 SBSQ HOSP IP/OBS MODERATE 35: CPT | Performed by: INTERNAL MEDICINE

## 2025-02-10 PROCEDURE — 99221 1ST HOSP IP/OBS SF/LOW 40: CPT

## 2025-02-10 PROCEDURE — 74177 CT ABD & PELVIS W/CONTRAST: CPT

## 2025-02-10 PROCEDURE — 2500000001 HC RX 250 WO HCPCS SELF ADMINISTERED DRUGS (ALT 637 FOR MEDICARE OP): Performed by: INTERNAL MEDICINE

## 2025-02-10 PROCEDURE — 2500000004 HC RX 250 GENERAL PHARMACY W/ HCPCS (ALT 636 FOR OP/ED): Performed by: INTERNAL MEDICINE

## 2025-02-10 PROCEDURE — 36415 COLL VENOUS BLD VENIPUNCTURE: CPT | Performed by: INTERNAL MEDICINE

## 2025-02-10 PROCEDURE — 85027 COMPLETE CBC AUTOMATED: CPT | Performed by: INTERNAL MEDICINE

## 2025-02-10 PROCEDURE — 2550000001 HC RX 255 CONTRASTS: Performed by: INTERNAL MEDICINE

## 2025-02-10 PROCEDURE — 80053 COMPREHEN METABOLIC PANEL: CPT | Performed by: INTERNAL MEDICINE

## 2025-02-10 PROCEDURE — 74177 CT ABD & PELVIS W/CONTRAST: CPT | Performed by: RADIOLOGY

## 2025-02-10 PROCEDURE — 1200000002 HC GENERAL ROOM WITH TELEMETRY DAILY

## 2025-02-10 PROCEDURE — 2500000002 HC RX 250 W HCPCS SELF ADMINISTERED DRUGS (ALT 637 FOR MEDICARE OP, ALT 636 FOR OP/ED): Performed by: INTERNAL MEDICINE

## 2025-02-10 RX ORDER — AZITHROMYCIN 500 MG/1
500 TABLET, FILM COATED ORAL EVERY 24 HOURS
Status: DISCONTINUED | OUTPATIENT
Start: 2025-02-11 | End: 2025-02-12

## 2025-02-10 RX ADMIN — NYSTATIN 500000 UNITS: 100000 SUSPENSION ORAL at 20:18

## 2025-02-10 RX ADMIN — NYSTATIN 500000 UNITS: 100000 SUSPENSION ORAL at 15:10

## 2025-02-10 RX ADMIN — AZITHROMYCIN MONOHYDRATE 500 MG: 500 INJECTION, POWDER, LYOPHILIZED, FOR SOLUTION INTRAVENOUS at 10:31

## 2025-02-10 RX ADMIN — OSELTAMAVIR PHOSPHATE 30 MG: 30 CAPSULE ORAL at 20:18

## 2025-02-10 RX ADMIN — NYSTATIN 500000 UNITS: 100000 SUSPENSION ORAL at 08:42

## 2025-02-10 RX ADMIN — IOHEXOL 75 ML: 350 INJECTION, SOLUTION INTRAVENOUS at 09:15

## 2025-02-10 RX ADMIN — OSELTAMAVIR PHOSPHATE 30 MG: 30 CAPSULE ORAL at 08:42

## 2025-02-10 RX ADMIN — LISINOPRIL 20 MG: 20 TABLET ORAL at 08:42

## 2025-02-10 RX ADMIN — PANTOPRAZOLE SODIUM 40 MG: 40 TABLET, DELAYED RELEASE ORAL at 06:37

## 2025-02-10 RX ADMIN — CEFTRIAXONE SODIUM 1 G: 1 INJECTION, SOLUTION INTRAVENOUS at 08:43

## 2025-02-10 RX ADMIN — ENOXAPARIN SODIUM 40 MG: 40 INJECTION SUBCUTANEOUS at 20:18

## 2025-02-10 RX ADMIN — POLYETHYLENE GLYCOL 3350 17 G: 17 POWDER, FOR SOLUTION ORAL at 09:12

## 2025-02-10 SDOH — SOCIAL STABILITY: SOCIAL INSECURITY: ARE YOU MARRIED, WIDOWED, DIVORCED, SEPARATED, NEVER MARRIED, OR LIVING WITH A PARTNER?: MARRIED

## 2025-02-10 SDOH — ECONOMIC STABILITY: TRANSPORTATION INSECURITY: IN THE PAST 12 MONTHS, HAS LACK OF TRANSPORTATION KEPT YOU FROM MEDICAL APPOINTMENTS OR FROM GETTING MEDICATIONS?: NO

## 2025-02-10 SDOH — HEALTH STABILITY: MENTAL HEALTH: HOW MANY DRINKS CONTAINING ALCOHOL DO YOU HAVE ON A TYPICAL DAY WHEN YOU ARE DRINKING?: PATIENT DOES NOT DRINK

## 2025-02-10 SDOH — ECONOMIC STABILITY: FOOD INSECURITY: WITHIN THE PAST 12 MONTHS, YOU WORRIED THAT YOUR FOOD WOULD RUN OUT BEFORE YOU GOT THE MONEY TO BUY MORE.: NEVER TRUE

## 2025-02-10 SDOH — ECONOMIC STABILITY: INCOME INSECURITY: IN THE PAST 12 MONTHS HAS THE ELECTRIC, GAS, OIL, OR WATER COMPANY THREATENED TO SHUT OFF SERVICES IN YOUR HOME?: NO

## 2025-02-10 SDOH — SOCIAL STABILITY: SOCIAL INSECURITY: WITHIN THE LAST YEAR, HAVE YOU BEEN AFRAID OF YOUR PARTNER OR EX-PARTNER?: NO

## 2025-02-10 SDOH — ECONOMIC STABILITY: FOOD INSECURITY: WITHIN THE PAST 12 MONTHS, THE FOOD YOU BOUGHT JUST DIDN'T LAST AND YOU DIDN'T HAVE MONEY TO GET MORE.: NEVER TRUE

## 2025-02-10 SDOH — SOCIAL STABILITY: SOCIAL NETWORK: HOW OFTEN DO YOU ATTEND CHURCH OR RELIGIOUS SERVICES?: MORE THAN 4 TIMES PER YEAR

## 2025-02-10 SDOH — HEALTH STABILITY: PHYSICAL HEALTH
HOW OFTEN DO YOU NEED TO HAVE SOMEONE HELP YOU WHEN YOU READ INSTRUCTIONS, PAMPHLETS, OR OTHER WRITTEN MATERIAL FROM YOUR DOCTOR OR PHARMACY?: NEVER

## 2025-02-10 SDOH — HEALTH STABILITY: MENTAL HEALTH: HOW OFTEN DO YOU HAVE A DRINK CONTAINING ALCOHOL?: NEVER

## 2025-02-10 SDOH — SOCIAL STABILITY: SOCIAL INSECURITY: WITHIN THE LAST YEAR, HAVE YOU BEEN HUMILIATED OR EMOTIONALLY ABUSED IN OTHER WAYS BY YOUR PARTNER OR EX-PARTNER?: NO

## 2025-02-10 SDOH — ECONOMIC STABILITY: FOOD INSECURITY: HOW HARD IS IT FOR YOU TO PAY FOR THE VERY BASICS LIKE FOOD, HOUSING, MEDICAL CARE, AND HEATING?: NOT HARD AT ALL

## 2025-02-10 SDOH — HEALTH STABILITY: MENTAL HEALTH: HOW OFTEN DO YOU HAVE SIX OR MORE DRINKS ON ONE OCCASION?: NEVER

## 2025-02-10 SDOH — ECONOMIC STABILITY: HOUSING INSECURITY: IN THE LAST 12 MONTHS, WAS THERE A TIME WHEN YOU WERE NOT ABLE TO PAY THE MORTGAGE OR RENT ON TIME?: NO

## 2025-02-10 SDOH — SOCIAL STABILITY: SOCIAL NETWORK
DO YOU BELONG TO ANY CLUBS OR ORGANIZATIONS SUCH AS CHURCH GROUPS, UNIONS, FRATERNAL OR ATHLETIC GROUPS, OR SCHOOL GROUPS?: YES

## 2025-02-10 SDOH — ECONOMIC STABILITY: HOUSING INSECURITY: AT ANY TIME IN THE PAST 12 MONTHS, WERE YOU HOMELESS OR LIVING IN A SHELTER (INCLUDING NOW)?: NO

## 2025-02-10 SDOH — ECONOMIC STABILITY: HOUSING INSECURITY: IN THE PAST 12 MONTHS, HOW MANY TIMES HAVE YOU MOVED WHERE YOU WERE LIVING?: 1

## 2025-02-10 SDOH — SOCIAL STABILITY: SOCIAL NETWORK: HOW OFTEN DO YOU GET TOGETHER WITH FRIENDS OR RELATIVES?: MORE THAN THREE TIMES A WEEK

## 2025-02-10 SDOH — SOCIAL STABILITY: SOCIAL NETWORK: HOW OFTEN DO YOU ATTEND MEETINGS OF THE CLUBS OR ORGANIZATIONS YOU BELONG TO?: MORE THAN 4 TIMES PER YEAR

## 2025-02-10 SDOH — SOCIAL STABILITY: SOCIAL NETWORK
IN A TYPICAL WEEK, HOW MANY TIMES DO YOU TALK ON THE PHONE WITH FAMILY, FRIENDS, OR NEIGHBORS?: MORE THAN THREE TIMES A WEEK

## 2025-02-10 ASSESSMENT — ENCOUNTER SYMPTOMS
MUSCULOSKELETAL NEGATIVE: 1
HEMATOLOGIC/LYMPHATIC NEGATIVE: 1
ENDOCRINE NEGATIVE: 1
LIGHT-HEADEDNESS: 1
ALLERGIC/IMMUNOLOGIC NEGATIVE: 1
FEVER: 1
CARDIOVASCULAR NEGATIVE: 1
PSYCHIATRIC NEGATIVE: 1
GASTROINTESTINAL NEGATIVE: 1
EYES NEGATIVE: 1
CHILLS: 1
SHORTNESS OF BREATH: 1
APPETITE CHANGE: 1

## 2025-02-10 ASSESSMENT — COGNITIVE AND FUNCTIONAL STATUS - GENERAL
MOVING FROM LYING ON BACK TO SITTING ON SIDE OF FLAT BED WITH BEDRAILS: A LITTLE
CLIMB 3 TO 5 STEPS WITH RAILING: A LITTLE
CLIMB 3 TO 5 STEPS WITH RAILING: A LITTLE
MOVING TO AND FROM BED TO CHAIR: A LITTLE
DAILY ACTIVITIY SCORE: 24
TURNING FROM BACK TO SIDE WHILE IN FLAT BAD: A LITTLE
MOVING TO AND FROM BED TO CHAIR: A LITTLE
WALKING IN HOSPITAL ROOM: A LITTLE
DAILY ACTIVITIY SCORE: 24
STANDING UP FROM CHAIR USING ARMS: A LITTLE
MOBILITY SCORE: 18
WALKING IN HOSPITAL ROOM: A LITTLE
MOBILITY SCORE: 18
MOVING FROM LYING ON BACK TO SITTING ON SIDE OF FLAT BED WITH BEDRAILS: A LITTLE
STANDING UP FROM CHAIR USING ARMS: A LITTLE
TURNING FROM BACK TO SIDE WHILE IN FLAT BAD: A LITTLE

## 2025-02-10 ASSESSMENT — LIFESTYLE VARIABLES
SKIP TO QUESTIONS 9-10: 1
AUDIT-C TOTAL SCORE: 0

## 2025-02-10 ASSESSMENT — ACTIVITIES OF DAILY LIVING (ADL): LACK_OF_TRANSPORTATION: NO

## 2025-02-10 ASSESSMENT — PAIN SCALES - GENERAL
PAINLEVEL_OUTOF10: 0 - NO PAIN

## 2025-02-10 ASSESSMENT — PAIN - FUNCTIONAL ASSESSMENT
PAIN_FUNCTIONAL_ASSESSMENT: 0-10

## 2025-02-10 ASSESSMENT — PAIN SCALES - WONG BAKER: WONGBAKER_NUMERICALRESPONSE: NO HURT

## 2025-02-10 NOTE — CARE PLAN
The patient's goals for the shift include  no falls    The clinical goals for the shift include Pt will remain safe this shift.      Problem: Respiratory  Goal: No signs of respiratory distress (eg. Use of accessory muscles. Peds grunting)  Outcome: Progressing     Problem: Respiratory  Goal: No signs of respiratory distress (eg. Use of accessory muscles. Peds grunting)  Outcome: Progressing     Problem: Pain - Adult  Goal: Verbalizes/displays adequate comfort level or baseline comfort level  Outcome: Progressing

## 2025-02-10 NOTE — PROGRESS NOTES
Reta Davis is a 64 y.o. female on day 2 of admission presenting with Pneumonia due to infectious organism, unspecified laterality, unspecified part of lung.    Met with patient in room with spouse and . Asked to return so she is able to receive communion. Will attempt to return to discuss DC planning  Plan: admitted for URI, pneumonia receiving IV abx. Will return home with spouse when stable for DC from hospital.  Disposition: Home with   Barrier: IV abx  ADOD:  1-2 days       02/10/25 3619   Discharge Planning   Living Arrangements Spouse/significant other   Support Systems Spouse/significant other   Assistance Needed independent   Type of Residence Private residence   Number of Stairs to Enter Residence 2   Number of Stairs Within Residence 0   Do you have animals or pets at home? No   Who is requesting discharge planning? Provider   Home or Post Acute Services None   Expected Discharge Disposition Home   Does the patient need discharge transport arranged? No   Patient Choice   Patient / Family choosing to utilize agency / facility established prior to hospitalization No   Stroke Family Assessment   Stroke Family Assessment Needed No   Intensity of Service   Intensity of Service 0-30 min       Blanca Harmon RN

## 2025-02-10 NOTE — CARE PLAN
The patient's goals for the shift include      The clinical goals for the shift include to remisn HDS      Problem: Safety - Adult  Goal: Free from fall injury  Outcome: Progressing

## 2025-02-10 NOTE — PROGRESS NOTES
Reta Davis is a 64 y.o. female     Patient feels okay  LFTs are rising  Ultrasound normal  Will check a CT abdomen and pelvis    Review of Systems     Constitutional: no fever, no chills, not feeling poorly, not feeling tired   Cardiovascular: no chest pain   Respiratory: Cough with shortness of breath  Gastrointestinal: no abdominal pain, no constipation, no melena, no nausea, no diarrhea, no vomiting and no blood in stools.   Neurological: no headache,   All other systems have been reviewed and are negative for complaint.       Vitals:    02/10/25 1402   BP: 127/74   Pulse: 86   Resp: 18   Temp: 36.6 °C (97.9 °F)   SpO2: 97%        Scheduled medications  [START ON 2/11/2025] azithromycin, 500 mg, oral, q24h  cefTRIAXone, 1 g, intravenous, q24h  enoxaparin, 40 mg, subcutaneous, q24h  lisinopril, 20 mg, oral, Daily  nystatin, 5 mL, Swish & Swallow, TID  oseltamivir, 30 mg, oral, BID  pantoprazole, 40 mg, oral, Daily before breakfast   Or  pantoprazole, 40 mg, intravenous, Daily before breakfast  polyethylene glycol, 17 g, oral, Daily      Continuous medications     PRN medications  PRN medications: acetaminophen **OR** acetaminophen **OR** acetaminophen, benzonatate, guaiFENesin, ipratropium-albuteroL, melatonin, ondansetron **OR** ondansetron, oxygen, polyethylene glycol, traMADol    Lab Review   Results from last 7 days   Lab Units 02/10/25  0538 02/09/25  1010 02/08/25  1538   WBC AUTO x10*3/uL 12.8* 9.9 8.3   HEMOGLOBIN g/dL 12.3 13.1 13.0   HEMATOCRIT % 37.5 39.1 39.7   PLATELETS AUTO x10*3/uL 290 261 212     Results from last 7 days   Lab Units 02/10/25  0538 02/09/25  1010 02/08/25  1538   SODIUM mmol/L 138 138 140   POTASSIUM mmol/L 3.9 4.1 3.7   CHLORIDE mmol/L 100 99 100   CO2 mmol/L 27 29 29   BUN mg/dL 36* 32* 27*   CREATININE mg/dL 0.87 0.88 0.97   CALCIUM mg/dL 9.2 9.3 9.1   PROTEIN TOTAL g/dL 7.5  --  7.0   BILIRUBIN TOTAL mg/dL 0.3  --  0.6   ALK PHOS U/L 173*  --  133   ALT U/L 771*  --  266*    AST U/L 496*  --  336*   GLUCOSE mg/dL 245* 249* 142*            CT abdomen pelvis w IV contrast   Final Result   Contracted thick-walled gallbladder. No significant biliary   dilatation.        Fluid distention of the right colon which could reflect nonspecific   colitis or ileus. Follow-up as clinically warranted.        Bibasilar bronchiectasis and partial consolidation of the visualized   left lung base.        Too small to characterize hypodensities in each kidney.        Additional findings as described above.        MACRO:   None.        Signed by: Stacia Maurice 2/10/2025 9:42 AM   Dictation workstation:   [id number]      US abdomen limited liver   Final Result   Unremarkable ultrasound of the right upper quadrant.        MACRO:   None.        Signed by: Vishal Almazan 2/9/2025 4:21 PM   Dictation workstation:   [id number]      CT angio chest for pulmonary embolism   Final Result   1. Interval worsening of left lower lobe infiltrate and left lung   volume loss with associated bronchiectasis when compared to previous   exam.   2. Interval development of left upper lobe and right lung milder   infiltrate as above. Clinical correlation and follow-up recommended   to document resolution.   3. No evidence for acute pulmonary embolic disease.             MACRO:   None        Signed by: Melinda Martins 2/8/2025 5:46 PM   Dictation workstation:   [id number]      XR chest 2 views   Final Result   1. Left basilar airspace disease and small effusion compatible with   pneumonia. Radiographic follow-up to resolution in 2-3 weeks is   advised.   2. Underlying emphysematous changes                  MACRO:   None        Signed by: Andrew Roper 2/8/2025 4:01 PM   Dictation workstation:   [id number]            Physical Exam    Constitutional   General appearance: Alert and in no acute distress.   Pulmonary   Respiratory assessment: On nasal cannula bilateral rhonchi  Cardiovascular   Auscultation of heart: Apical  pulse normal, heart rate and rhythm normal, normal S1 and S2, no murmurs and no pericardial rub.    Exam for edema: No peripheral edema.   Abdomen   Abdominal Exam: No bruits, normal bowel sounds, soft, non-tender, no abdominal mass palpated.    Liver and Spleen exam: No hepato-splenomegaly.   Musculoskeletal     Inspection/palpation of joints, bones and muscles: No joint swelling. Normal movement of all extremities.   Neurologic   Cranial nerves: Nerves 2-12 were intact, no focal neuro defects.         Assessment/Plan      Influenza B  #Pneumonia  #Transaminitis  Continue Tamiflu and antibiotics  Continue DuoNebs  Check Legionella/strep pneumo     #Transaminitis  Rising LFTs  CT shows a gallbladder-contracted  Patient is asymptomatic  Ultrasound was okay  ?  From pneumonia  Will get GI input    #Hypertension  Stable continue home medications     *Hyperglycemia  We stopped the Solu-Medrol  Check A1c

## 2025-02-11 ENCOUNTER — APPOINTMENT (OUTPATIENT)
Dept: RADIOLOGY | Facility: HOSPITAL | Age: 65
DRG: 194 | End: 2025-02-11
Payer: MEDICARE

## 2025-02-11 LAB
ALBUMIN SERPL BCP-MCNC: 3.2 G/DL (ref 3.4–5)
ALP SERPL-CCNC: 153 U/L (ref 33–136)
ALT SERPL W P-5'-P-CCNC: 731 U/L (ref 7–45)
ANION GAP SERPL CALC-SCNC: 12 MMOL/L (ref 10–20)
AST SERPL W P-5'-P-CCNC: 182 U/L (ref 9–39)
ATRIAL RATE: 97 BPM
BILIRUB SERPL-MCNC: 0.4 MG/DL (ref 0–1.2)
BUN SERPL-MCNC: 22 MG/DL (ref 6–23)
CALCIUM SERPL-MCNC: 8.9 MG/DL (ref 8.6–10.3)
CHLORIDE SERPL-SCNC: 103 MMOL/L (ref 98–107)
CO2 SERPL-SCNC: 30 MMOL/L (ref 21–32)
CREAT SERPL-MCNC: 0.75 MG/DL (ref 0.5–1.05)
EGFRCR SERPLBLD CKD-EPI 2021: 88 ML/MIN/1.73M*2
GLUCOSE SERPL-MCNC: 118 MG/DL (ref 74–99)
HOLD SPECIMEN: NORMAL
LEGIONELLA AG UR QL: NEGATIVE
P AXIS: 68 DEGREES
P OFFSET: 195 MS
P ONSET: 143 MS
POTASSIUM SERPL-SCNC: 4.2 MMOL/L (ref 3.5–5.3)
PR INTERVAL: 158 MS
PROT SERPL-MCNC: 6.8 G/DL (ref 6.4–8.2)
Q ONSET: 222 MS
QRS COUNT: 16 BEATS
QRS DURATION: 78 MS
QT INTERVAL: 310 MS
QTC CALCULATION(BAZETT): 393 MS
QTC FREDERICIA: 363 MS
R AXIS: 41 DEGREES
S PNEUM AG UR QL: NEGATIVE
SODIUM SERPL-SCNC: 141 MMOL/L (ref 136–145)
T AXIS: 46 DEGREES
T OFFSET: 377 MS
VENTRICULAR RATE: 97 BPM

## 2025-02-11 PROCEDURE — 1100000001 HC PRIVATE ROOM DAILY

## 2025-02-11 PROCEDURE — 71046 X-RAY EXAM CHEST 2 VIEWS: CPT | Performed by: RADIOLOGY

## 2025-02-11 PROCEDURE — 2500000004 HC RX 250 GENERAL PHARMACY W/ HCPCS (ALT 636 FOR OP/ED): Performed by: INTERNAL MEDICINE

## 2025-02-11 PROCEDURE — 80053 COMPREHEN METABOLIC PANEL: CPT | Performed by: INTERNAL MEDICINE

## 2025-02-11 PROCEDURE — 99231 SBSQ HOSP IP/OBS SF/LOW 25: CPT

## 2025-02-11 PROCEDURE — 99232 SBSQ HOSP IP/OBS MODERATE 35: CPT | Performed by: INTERNAL MEDICINE

## 2025-02-11 PROCEDURE — 2500000001 HC RX 250 WO HCPCS SELF ADMINISTERED DRUGS (ALT 637 FOR MEDICARE OP): Performed by: PHARMACIST

## 2025-02-11 PROCEDURE — 36415 COLL VENOUS BLD VENIPUNCTURE: CPT | Performed by: INTERNAL MEDICINE

## 2025-02-11 PROCEDURE — 2500000002 HC RX 250 W HCPCS SELF ADMINISTERED DRUGS (ALT 637 FOR MEDICARE OP, ALT 636 FOR OP/ED)

## 2025-02-11 PROCEDURE — 71046 X-RAY EXAM CHEST 2 VIEWS: CPT

## 2025-02-11 PROCEDURE — 2500000002 HC RX 250 W HCPCS SELF ADMINISTERED DRUGS (ALT 637 FOR MEDICARE OP, ALT 636 FOR OP/ED): Performed by: PHARMACIST

## 2025-02-11 PROCEDURE — 2500000001 HC RX 250 WO HCPCS SELF ADMINISTERED DRUGS (ALT 637 FOR MEDICARE OP): Performed by: INTERNAL MEDICINE

## 2025-02-11 RX ORDER — OSELTAMIVIR PHOSPHATE 75 MG/1
75 CAPSULE ORAL 2 TIMES DAILY
Status: COMPLETED | OUTPATIENT
Start: 2025-02-11 | End: 2025-02-13

## 2025-02-11 RX ORDER — FLUTICASONE PROPIONATE 50 MCG
2 SPRAY, SUSPENSION (ML) NASAL DAILY
Status: DISCONTINUED | OUTPATIENT
Start: 2025-02-11 | End: 2025-02-13 | Stop reason: HOSPADM

## 2025-02-11 RX ADMIN — ENOXAPARIN SODIUM 40 MG: 40 INJECTION SUBCUTANEOUS at 20:29

## 2025-02-11 RX ADMIN — NYSTATIN 500000 UNITS: 100000 SUSPENSION ORAL at 15:15

## 2025-02-11 RX ADMIN — AZITHROMYCIN 500 MG: 500 TABLET, FILM COATED ORAL at 08:48

## 2025-02-11 RX ADMIN — FLUTICASONE PROPIONATE 2 SPRAY: 50 SPRAY, METERED NASAL at 11:23

## 2025-02-11 RX ADMIN — LISINOPRIL 20 MG: 20 TABLET ORAL at 08:48

## 2025-02-11 RX ADMIN — OSELTAMAVIR PHOSPHATE 75 MG: 75 CAPSULE ORAL at 20:29

## 2025-02-11 RX ADMIN — OSELTAMAVIR PHOSPHATE 75 MG: 75 CAPSULE ORAL at 08:47

## 2025-02-11 RX ADMIN — NYSTATIN 500000 UNITS: 100000 SUSPENSION ORAL at 20:29

## 2025-02-11 RX ADMIN — NYSTATIN 500000 UNITS: 100000 SUSPENSION ORAL at 08:47

## 2025-02-11 RX ADMIN — PANTOPRAZOLE SODIUM 40 MG: 40 TABLET, DELAYED RELEASE ORAL at 06:23

## 2025-02-11 RX ADMIN — CEFTRIAXONE SODIUM 1 G: 1 INJECTION, SOLUTION INTRAVENOUS at 09:18

## 2025-02-11 RX ADMIN — POLYETHYLENE GLYCOL 3350 17 G: 17 POWDER, FOR SOLUTION ORAL at 08:48

## 2025-02-11 ASSESSMENT — PAIN SCALES - GENERAL
PAINLEVEL_OUTOF10: 0 - NO PAIN

## 2025-02-11 ASSESSMENT — COGNITIVE AND FUNCTIONAL STATUS - GENERAL
STANDING UP FROM CHAIR USING ARMS: A LITTLE
DAILY ACTIVITIY SCORE: 24
MOBILITY SCORE: 18
WALKING IN HOSPITAL ROOM: A LITTLE
MOVING TO AND FROM BED TO CHAIR: A LITTLE
CLIMB 3 TO 5 STEPS WITH RAILING: A LITTLE
TURNING FROM BACK TO SIDE WHILE IN FLAT BAD: A LITTLE
MOVING FROM LYING ON BACK TO SITTING ON SIDE OF FLAT BED WITH BEDRAILS: A LITTLE

## 2025-02-11 ASSESSMENT — PAIN - FUNCTIONAL ASSESSMENT
PAIN_FUNCTIONAL_ASSESSMENT: 0-10

## 2025-02-11 NOTE — PROGRESS NOTES
02/11/25 0907   Discharge Planning   Expected Discharge Disposition Home     Once med ready plan is to discharge home, GI consulted for transaminitis need to monitor patients   LFT's.

## 2025-02-11 NOTE — CARE PLAN
The patient's goals for the shift include      The clinical goals for the shift include Pt will remain safe this shift.    Over the shift, the patient did not make progress toward the following goals. Barriers to progression include . Recommendations to address these barriers include .

## 2025-02-11 NOTE — PROGRESS NOTES
Reta Davis is a 64 y.o. female on day 3 of admission presenting with Pneumonia due to infectious organism, unspecified laterality, unspecified part of lung.      Subjective   No acute events overnight.  Patient seen and examined at bedside.  Patient still has a lot of questions about her pneumonia.  Ask about the results of her today's chest x-ray.  That LFTs trending down.        Objective     Last Recorded Vitals  /73 (Patient Position: Lying)   Pulse 76   Temp 36.7 °C (98.1 °F) (Temporal)   Resp 18   Wt 68.5 kg (151 lb)   SpO2 98%   Intake/Output last 3 Shifts:    Intake/Output Summary (Last 24 hours) at 2/11/2025 1544  Last data filed at 2/11/2025 1254  Gross per 24 hour   Intake 1200 ml   Output 500 ml   Net 700 ml       Admission Weight  Weight: 68.5 kg (151 lb) (02/08/25 1509)    Daily Weight  02/08/25 : 68.5 kg (151 lb)    Image Results  ECG 12 lead  Normal sinus rhythm  Possible Left atrial enlargement  Borderline ECG  When compared with ECG of 26-MAR-2021 12:29,  No significant change was found  XR chest 2 views  Narrative: Interpreted By:  Zully Curtis,   STUDY:  XR CHEST 2 VIEWS;  2/11/2025 11:08 am      INDICATION:  Signs/Symptoms:sob.      COMPARISON:  02/08/2025      ACCESSION NUMBER(S):  [id number]      ORDERING CLINICIAN:  BRENT CHAND      FINDINGS:  streaky density is noted in the left lung base. The heart is not  enlarged. No pleural effusion or pneumothorax is noted.      Impression: Streaky density in left lung base, which may be related to infiltrate  or atelectasis.          MACRO:  None      Signed by: Zully Curtis 2/11/2025 11:33 AM  Dictation workstation:   [id number]      Physical Exam  Vitals reviewed.   Constitutional:       Appearance: She is obese. She is ill-appearing.   HENT:      Head: Atraumatic.   Cardiovascular:      Rate and Rhythm: Normal rate and regular rhythm.      Pulses: Normal pulses.      Heart sounds: Normal heart sounds.   Pulmonary:      Effort:  Pulmonary effort is normal.      Breath sounds: Normal breath sounds.   Abdominal:      General: Bowel sounds are normal. There is no distension.      Palpations: Abdomen is soft.      Tenderness: There is no abdominal tenderness. There is no guarding or rebound.   Musculoskeletal:         General: Normal range of motion.      Cervical back: Neck supple.   Skin:     General: Skin is warm and dry.   Neurological:      General: No focal deficit present.      Mental Status: She is alert and oriented to person, place, and time.   Psychiatric:         Mood and Affect: Mood normal.         Behavior: Behavior normal.         Relevant Results             Scheduled medications  azithromycin, 500 mg, oral, q24h  cefTRIAXone, 1 g, intravenous, q24h  enoxaparin, 40 mg, subcutaneous, q24h  fluticasone, 2 spray, Each Nostril, Daily  lisinopril, 20 mg, oral, Daily  nystatin, 5 mL, Swish & Swallow, TID  oseltamivir, 75 mg, oral, BID  pantoprazole, 40 mg, oral, Daily before breakfast   Or  pantoprazole, 40 mg, intravenous, Daily before breakfast  polyethylene glycol, 17 g, oral, Daily      Continuous medications     PRN medications  PRN medications: acetaminophen **OR** acetaminophen **OR** acetaminophen, benzonatate, guaiFENesin, ipratropium-albuteroL, melatonin, ondansetron **OR** ondansetron, oxygen, polyethylene glycol, traMADol       Results for orders placed or performed during the hospital encounter of 02/08/25 (from the past 24 hours)   SST TOP   Result Value Ref Range    Extra Tube Hold for add-ons.    Comprehensive Metabolic Panel   Result Value Ref Range    Glucose 118 (H) 74 - 99 mg/dL    Sodium 141 136 - 145 mmol/L    Potassium 4.2 3.5 - 5.3 mmol/L    Chloride 103 98 - 107 mmol/L    Bicarbonate 30 21 - 32 mmol/L    Anion Gap 12 10 - 20 mmol/L    Urea Nitrogen 22 6 - 23 mg/dL    Creatinine 0.75 0.50 - 1.05 mg/dL    eGFR 88 >60 mL/min/1.73m*2    Calcium 8.9 8.6 - 10.3 mg/dL    Albumin 3.2 (L) 3.4 - 5.0 g/dL    Alkaline  Phosphatase 153 (H) 33 - 136 U/L    Total Protein 6.8 6.4 - 8.2 g/dL     (H) 9 - 39 U/L    Bilirubin, Total 0.4 0.0 - 1.2 mg/dL     (H) 7 - 45 U/L     *Note: Due to a large number of results and/or encounters for the requested time period, some results have not been displayed. A complete set of results can be found in Results Review.    ECG 12 lead    Result Date: 2/11/2025  Normal sinus rhythm Possible Left atrial enlargement Borderline ECG When compared with ECG of 26-MAR-2021 12:29, No significant change was found    XR chest 2 views    Result Date: 2/11/2025  Interpreted By:  Zully Curtis, STUDY: XR CHEST 2 VIEWS;  2/11/2025 11:08 am   INDICATION: Signs/Symptoms:sob.   COMPARISON: 02/08/2025   ACCESSION NUMBER(S): [id number]   ORDERING CLINICIAN: BRENT CHAND   FINDINGS: streaky density is noted in the left lung base. The heart is not enlarged. No pleural effusion or pneumothorax is noted.       Streaky density in left lung base, which may be related to infiltrate or atelectasis.     MACRO: None   Signed by: Zully Curtis 2/11/2025 11:33 AM Dictation workstation:   [id number]    CT abdomen pelvis w IV contrast    Result Date: 2/10/2025  Interpreted By:  Stacia Maurice, STUDY: CT ABDOMEN PELVIS W IV CONTRAST;  2/10/2025 9:20 am   INDICATION: Signs/Symptoms:Transaminitis.     COMPARISON: 11/10/2023   ACCESSION NUMBER(S): [id number]   ORDERING CLINICIAN: JO ANN LACY   TECHNIQUE: CT of the abdomen and pelvis was performed. Sagittal and coronal reconstructions were generated.  75 ML Omnipaque 350 intravenous contrast given for the exam.   FINDINGS:     ABDOMINAL ORGANS:   LIVER: No focal lesion   GALL BLADDER AND BILIARY TREE: Contracted thick-walled gallbladder. No significant biliary dilatation.   SPLEEN: No focal lesion   PANCREAS: No focal lesion   ADRENALS: No adrenal mass   KIDNEYS AND URETERS: Subcentimeter hypodensities in each kidney. No hydronephrosis   BOWEL: No abnormally dilated  small bowel loops. Right lower quadrant suture line again seen. Distended fluid-filled right colon. Distal colon diverticulosis.   PERITONEUM, RETROPERITONEUM, NODES: No significant free fluid. No free air. No significant retroperitoneal lymphadenopathy. 0.8 cm soft tissue density possible lymph lymph node or diverticulum adjacent to the sigmoid colon image 107/150. Nodular soft tissue thickening contiguous with the distal sigmoid colon and left vaginal cuff image 114 and 115/150 similar to the previous exam.   VESSELS:  Scattered atherosclerotic calcifications. No abdominal aortic aneurysm.   PELVIS: Urinary bladder is moderately distended without focal wall thickening. Stable presumed surgical absence of the uterus again with soft tissue thickening contiguous with the vaginal cuff.   ABDOMINAL WALL: No sizable abdominal wall hernia.   BONES: Multifocal presumed degenerative changes. Mild scoliosis.   LOWER CHEST: Patchy bibasilar opacities and subtotal consolidation of the visualized left lower lobe. Bibasilar bronchiectasis and bronchial wall thickening.       Contracted thick-walled gallbladder. No significant biliary dilatation.   Fluid distention of the right colon which could reflect nonspecific colitis or ileus. Follow-up as clinically warranted.   Bibasilar bronchiectasis and partial consolidation of the visualized left lung base.   Too small to characterize hypodensities in each kidney.   Additional findings as described above.   MACRO: None.   Signed by: Stacia Maurice 2/10/2025 9:42 AM Dictation workstation:   [id number]    US abdomen limited liver    Result Date: 2/9/2025  Interpreted By:  Vishal Almazan, STUDY: US ABDOMEN LIMITED LIVER;  2/9/2025 3:34 pm   INDICATION: Signs/Symptoms:Transaminitis.     COMPARISON: CT chest, abdomen and pelvis of 10/26/2022.   ACCESSION NUMBER(S): [id number]   ORDERING CLINICIAN: JO ANN LACY   TECHNIQUE: Real-time sonographic evaluation of the right upper quadrant  was performed.   FINDINGS: LIVER: The liver is homogeneous in echotexture. No focal hepatic lesion is identified.  Liver measures 15 cm in sagittal dimension.   GALLBLADDER: The gallbladder is nondistended and demonstrates no evidence of gallstones, wall thickening or surrounding fluid. The gallbladder wall measures 2 mm. Sonographic Santana's sign is negative.   BILIARY TREE: Common bile duct is not dilated measuring 4 mm in diameter.   PANCREAS: The visualized pancreas is unremarkable in appearance. Pancreatic distal body and tail are obscured by bowel gas.   RIGHT KIDNEY: Right kidney measures  10.3cm in length.  No right hydronephrosis is seen.       Unremarkable ultrasound of the right upper quadrant.   MACRO: None.   Signed by: Vishal Almazan 2/9/2025 4:21 PM Dictation workstation:   [id number]    CT angio chest for pulmonary embolism    Result Date: 2/8/2025  Interpreted By:  Melinda Martins, STUDY: CT ANGIO CHEST FOR PULMONARY EMBOLISM;  2/8/2025 4:53 pm   INDICATION: Signs/Symptoms:sob and pain.   COMPARISON: 11/10/2023   ACCESSION NUMBER(S): [id number]   ORDERING CLINICIAN: CHIRAG PEDERSON   TECHNIQUE: Helical data acquisition of the chest was obtained after intravenous administration of 75 mL Omnipaque 350, as per PE protocol. Images were reformatted in coronal and sagittal planes. Axial and coronal maximum intensity projection (MIP) images were created and reviewed.   FINDINGS: POTENTIAL LIMITATIONS OF THE STUDY: None   HEART AND VESSELS:   There are no discrete filling defects within main pulmonary artery and its branches to suggest acute pulmonary embolism.   Main pulmonary artery and its branches are normal in caliber.   The thoracic aorta normal in course and caliber.   No coronary artery calcifications are seen. Please note, the study is not optimized for evaluation of coronary arteries.   The cardiac chambers are not enlarged.   Trace amount of pericardial effusion or pericardial  thickening similar to prior.   MEDIASTINUM AND FEROZ, LOWER NECK AND AXILLA:   The visualized thyroid gland is within normal limits.   No evidence of thoracic lymphadenopathy by CT criteria. Subcentimeter nonspecific mediastinal lymph nodes none of which reaches pathological size by CT imaging criteria. The largest in the AP window measures 9 mm in short axis.   Tiny hiatal hernia. Esophagus otherwise within normal limits.   LUNGS AND AIRWAYS: The trachea and central airways are patent. No endobronchial lesion is seen.   Diffuse, patchy, ground-glass and nodular infiltrate within right upper lobe and to a lesser extent, left upper and right lower lobes, new since previous exam. Similar infiltrate/consolidation within the left lower lobe with associated bronchiectasis and bronchial wall thickening, worse when compared to previous exam associated. There is redemonstration of left lower lobe volume loss. No pleural effusion. No pneumothorax.     UPPER ABDOMEN: The visualized subdiaphragmatic structures demonstrate no remarkable findings.   CHEST WALL AND OSSEOUS STRUCTURES:   Chest wall is within normal limits. No acute osseous pathology.There are no suspicious osseous lesions.       1. Interval worsening of left lower lobe infiltrate and left lung volume loss with associated bronchiectasis when compared to previous exam. 2. Interval development of left upper lobe and right lung milder infiltrate as above. Clinical correlation and follow-up recommended to document resolution. 3. No evidence for acute pulmonary embolic disease.     MACRO: None   Signed by: Melinda Martins 2/8/2025 5:46 PM Dictation workstation:   [id number]    XR chest 2 views    Result Date: 2/8/2025  Interpreted By:  Andrew Roper, STUDY: XR CHEST 2 VIEWS;  2/8/2025 3:55 pm   INDICATION: Signs/Symptoms:hypoxia.     COMPARISON: 02/17/2023   ACCESSION NUMBER(S): [id number]   ORDERING CLINICIAN: CHIRAG PEDERSON   FINDINGS:          CARDIOMEDIASTINAL SILHOUETTE: Cardiomediastinal silhouette is normal in size and configuration.   LUNGS: The lungs are hyperinflated. There is dense airspace disease at the left lung base. There is a small left effusion. The right lung is clear..   ABDOMEN: No remarkable upper abdominal findings.   BONES: No acute osseous changes.       1. Left basilar airspace disease and small effusion compatible with pneumonia. Radiographic follow-up to resolution in 2-3 weeks is advised. 2. Underlying emphysematous changes       MACRO: None   Signed by: Andrew Roper 2/8/2025 4:01 PM Dictation workstation:   [id number]    Assessment/Plan                  Assessment & Plan  Pneumonia due to infectious organism, unspecified laterality, unspecified part of lung    Reta Davis is a 64 y.o. 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   GI service was consulted for transaminitis.  LFTs trending down.  Most likely DILI.  Viral hepatitis workup negative.      -Diet as tolerated  -Avoid hepatotoxic medication if possible  -Continue to trend LFTs  -Supportive care as per primary team  -GI will sign off.  Please reach out if any questions further assistance needed        Case discussed with Dr. Trista Cruz, APRN-CNP

## 2025-02-11 NOTE — PROGRESS NOTES
Reta Davis is a 64 y.o. female     LFT shows slight improvement  Complains of bilateral hearing loss because of congestion from the cold  We do not have any otoscope on the floors for me to do an ear exam    Review of Systems     Constitutional: no fever, no chills, not feeling poorly, not feeling tired   Cardiovascular: no chest pain   Respiratory: Cough with shortness of breath  Gastrointestinal: no abdominal pain, no constipation, no melena, no nausea, no diarrhea, no vomiting and no blood in stools.   Neurological: no headache,   All other systems have been reviewed and are negative for complaint.       Vitals:    02/11/25 1254   BP: 124/73   Pulse: 76   Resp: 18   Temp: 36.7 °C (98.1 °F)   SpO2: 98%        Scheduled medications  azithromycin, 500 mg, oral, q24h  cefTRIAXone, 1 g, intravenous, q24h  enoxaparin, 40 mg, subcutaneous, q24h  fluticasone, 2 spray, Each Nostril, Daily  lisinopril, 20 mg, oral, Daily  nystatin, 5 mL, Swish & Swallow, TID  oseltamivir, 75 mg, oral, BID  pantoprazole, 40 mg, oral, Daily before breakfast   Or  pantoprazole, 40 mg, intravenous, Daily before breakfast  polyethylene glycol, 17 g, oral, Daily      Continuous medications     PRN medications  PRN medications: acetaminophen **OR** acetaminophen **OR** acetaminophen, benzonatate, guaiFENesin, ipratropium-albuteroL, melatonin, ondansetron **OR** ondansetron, oxygen, polyethylene glycol, traMADol    Lab Review   Results from last 7 days   Lab Units 02/10/25  0538 02/09/25  1010 02/08/25  1538   WBC AUTO x10*3/uL 12.8* 9.9 8.3   HEMOGLOBIN g/dL 12.3 13.1 13.0   HEMATOCRIT % 37.5 39.1 39.7   PLATELETS AUTO x10*3/uL 290 261 212     Results from last 7 days   Lab Units 02/11/25  0613 02/10/25  0538 02/09/25  1010 02/08/25  1538   SODIUM mmol/L 141 138 138 140   POTASSIUM mmol/L 4.2 3.9 4.1 3.7   CHLORIDE mmol/L 103 100 99 100   CO2 mmol/L 30 27 29 29   BUN mg/dL 22 36* 32* 27*   CREATININE mg/dL 0.75 0.87 0.88 0.97   CALCIUM mg/dL  8.9 9.2 9.3 9.1   PROTEIN TOTAL g/dL 6.8 7.5  --  7.0   BILIRUBIN TOTAL mg/dL 0.4 0.3  --  0.6   ALK PHOS U/L 153* 173*  --  133   ALT U/L 731* 771*  --  266*   AST U/L 182* 496*  --  336*   GLUCOSE mg/dL 118* 245* 249* 142*            XR chest 2 views   Final Result   Streaky density in left lung base, which may be related to infiltrate   or atelectasis.             MACRO:   None        Signed by: Zully Curtis 2/11/2025 11:33 AM   Dictation workstation:   [id number]      CT abdomen pelvis w IV contrast   Final Result   Contracted thick-walled gallbladder. No significant biliary   dilatation.        Fluid distention of the right colon which could reflect nonspecific   colitis or ileus. Follow-up as clinically warranted.        Bibasilar bronchiectasis and partial consolidation of the visualized   left lung base.        Too small to characterize hypodensities in each kidney.        Additional findings as described above.        MACRO:   None.        Signed by: Stacia Maurice 2/10/2025 9:42 AM   Dictation workstation:   [id number]      US abdomen limited liver   Final Result   Unremarkable ultrasound of the right upper quadrant.        MACRO:   None.        Signed by: Vishal Almazan 2/9/2025 4:21 PM   Dictation workstation:   [id number]      CT angio chest for pulmonary embolism   Final Result   1. Interval worsening of left lower lobe infiltrate and left lung   volume loss with associated bronchiectasis when compared to previous   exam.   2. Interval development of left upper lobe and right lung milder   infiltrate as above. Clinical correlation and follow-up recommended   to document resolution.   3. No evidence for acute pulmonary embolic disease.             MACRO:   None        Signed by: Melinda Martins 2/8/2025 5:46 PM   Dictation workstation:   [id number]      XR chest 2 views   Final Result   1. Left basilar airspace disease and small effusion compatible with   pneumonia. Radiographic follow-up to  resolution in 2-3 weeks is   advised.   2. Underlying emphysematous changes                  MACRO:   None        Signed by: Andrew Roper 2/8/2025 4:01 PM   Dictation workstation:   [id number]            Physical Exam    Constitutional   General appearance: Alert and in no acute distress.   Pulmonary   Respiratory assessment: On nasal cannula bilateral rhonchi  Cardiovascular   Auscultation of heart: Apical pulse normal, heart rate and rhythm normal, normal S1 and S2, no murmurs and no pericardial rub.    Exam for edema: No peripheral edema.   Abdomen   Abdominal Exam: No bruits, normal bowel sounds, soft, non-tender, no abdominal mass palpated.    Liver and Spleen exam: No hepato-splenomegaly.   Musculoskeletal     Inspection/palpation of joints, bones and muscles: No joint swelling. Normal movement of all extremities.   Neurologic   Cranial nerves: Nerves 2-12 were intact, no focal neuro defects.         Assessment/Plan      Influenza B  #Pneumonia  #Transaminitis  Continue Tamiflu and antibiotics  Continue DuoNebs  Strep pneumo and Legionella negative       #Transaminitis  LFTs slightly lower today  ?  TESSA I  Continue to trend liver function tests    #Hypertension  Stable continue home medications     *Hyperglycemia  A1c of 6.4 suggests prediabetes

## 2025-02-12 LAB
ALBUMIN SERPL BCP-MCNC: 3.1 G/DL (ref 3.4–5)
ALP SERPL-CCNC: 129 U/L (ref 33–136)
ALT SERPL W P-5'-P-CCNC: 427 U/L (ref 7–45)
ANION GAP SERPL CALC-SCNC: 11 MMOL/L (ref 10–20)
AST SERPL W P-5'-P-CCNC: 41 U/L (ref 9–39)
BILIRUB SERPL-MCNC: 0.4 MG/DL (ref 0–1.2)
BUN SERPL-MCNC: 16 MG/DL (ref 6–23)
CALCIUM SERPL-MCNC: 8.8 MG/DL (ref 8.6–10.3)
CHLORIDE SERPL-SCNC: 104 MMOL/L (ref 98–107)
CO2 SERPL-SCNC: 30 MMOL/L (ref 21–32)
CREAT SERPL-MCNC: 0.7 MG/DL (ref 0.5–1.05)
EGFRCR SERPLBLD CKD-EPI 2021: >90 ML/MIN/1.73M*2
ERYTHROCYTE [DISTWIDTH] IN BLOOD BY AUTOMATED COUNT: 12.6 % (ref 11.5–14.5)
GLUCOSE SERPL-MCNC: 132 MG/DL (ref 74–99)
HCT VFR BLD AUTO: 37.5 % (ref 36–46)
HGB BLD-MCNC: 11.9 G/DL (ref 12–16)
MCH RBC QN AUTO: 31.1 PG (ref 26–34)
MCHC RBC AUTO-ENTMCNC: 31.7 G/DL (ref 32–36)
MCV RBC AUTO: 98 FL (ref 80–100)
NRBC BLD-RTO: 0 /100 WBCS (ref 0–0)
PLATELET # BLD AUTO: 296 X10*3/UL (ref 150–450)
POTASSIUM SERPL-SCNC: 4.3 MMOL/L (ref 3.5–5.3)
PROT SERPL-MCNC: 6.7 G/DL (ref 6.4–8.2)
RBC # BLD AUTO: 3.83 X10*6/UL (ref 4–5.2)
SODIUM SERPL-SCNC: 141 MMOL/L (ref 136–145)
WBC # BLD AUTO: 11.1 X10*3/UL (ref 4.4–11.3)

## 2025-02-12 PROCEDURE — 1100000001 HC PRIVATE ROOM DAILY

## 2025-02-12 PROCEDURE — 80053 COMPREHEN METABOLIC PANEL: CPT | Performed by: INTERNAL MEDICINE

## 2025-02-12 PROCEDURE — 85027 COMPLETE CBC AUTOMATED: CPT | Performed by: INTERNAL MEDICINE

## 2025-02-12 PROCEDURE — 99232 SBSQ HOSP IP/OBS MODERATE 35: CPT | Performed by: INTERNAL MEDICINE

## 2025-02-12 PROCEDURE — 36415 COLL VENOUS BLD VENIPUNCTURE: CPT | Performed by: INTERNAL MEDICINE

## 2025-02-12 PROCEDURE — 2500000001 HC RX 250 WO HCPCS SELF ADMINISTERED DRUGS (ALT 637 FOR MEDICARE OP): Performed by: PHARMACIST

## 2025-02-12 PROCEDURE — 2500000004 HC RX 250 GENERAL PHARMACY W/ HCPCS (ALT 636 FOR OP/ED): Performed by: INTERNAL MEDICINE

## 2025-02-12 PROCEDURE — 99232 SBSQ HOSP IP/OBS MODERATE 35: CPT

## 2025-02-12 PROCEDURE — 2500000002 HC RX 250 W HCPCS SELF ADMINISTERED DRUGS (ALT 637 FOR MEDICARE OP, ALT 636 FOR OP/ED): Performed by: PHARMACIST

## 2025-02-12 PROCEDURE — 2500000001 HC RX 250 WO HCPCS SELF ADMINISTERED DRUGS (ALT 637 FOR MEDICARE OP): Performed by: INTERNAL MEDICINE

## 2025-02-12 PROCEDURE — 2500000002 HC RX 250 W HCPCS SELF ADMINISTERED DRUGS (ALT 637 FOR MEDICARE OP, ALT 636 FOR OP/ED)

## 2025-02-12 RX ADMIN — AZITHROMYCIN 500 MG: 500 TABLET, FILM COATED ORAL at 08:31

## 2025-02-12 RX ADMIN — ENOXAPARIN SODIUM 40 MG: 40 INJECTION SUBCUTANEOUS at 20:57

## 2025-02-12 RX ADMIN — OSELTAMAVIR PHOSPHATE 75 MG: 75 CAPSULE ORAL at 08:31

## 2025-02-12 RX ADMIN — PANTOPRAZOLE SODIUM 40 MG: 40 TABLET, DELAYED RELEASE ORAL at 06:24

## 2025-02-12 RX ADMIN — NYSTATIN 500000 UNITS: 100000 SUSPENSION ORAL at 20:57

## 2025-02-12 RX ADMIN — CEFTRIAXONE SODIUM 1 G: 1 INJECTION, SOLUTION INTRAVENOUS at 08:30

## 2025-02-12 RX ADMIN — NYSTATIN 500000 UNITS: 100000 SUSPENSION ORAL at 14:55

## 2025-02-12 RX ADMIN — FLUTICASONE PROPIONATE 2 SPRAY: 50 SPRAY, METERED NASAL at 08:31

## 2025-02-12 RX ADMIN — NYSTATIN 500000 UNITS: 100000 SUSPENSION ORAL at 08:30

## 2025-02-12 RX ADMIN — LISINOPRIL 20 MG: 20 TABLET ORAL at 08:31

## 2025-02-12 RX ADMIN — OSELTAMAVIR PHOSPHATE 75 MG: 75 CAPSULE ORAL at 20:57

## 2025-02-12 ASSESSMENT — COGNITIVE AND FUNCTIONAL STATUS - GENERAL
TURNING FROM BACK TO SIDE WHILE IN FLAT BAD: A LITTLE
STANDING UP FROM CHAIR USING ARMS: A LITTLE
MOVING FROM LYING ON BACK TO SITTING ON SIDE OF FLAT BED WITH BEDRAILS: A LITTLE
STANDING UP FROM CHAIR USING ARMS: A LITTLE
CLIMB 3 TO 5 STEPS WITH RAILING: A LITTLE
DRESSING REGULAR LOWER BODY CLOTHING: A LITTLE
MOVING TO AND FROM BED TO CHAIR: A LITTLE
MOVING FROM LYING ON BACK TO SITTING ON SIDE OF FLAT BED WITH BEDRAILS: A LITTLE
CLIMB 3 TO 5 STEPS WITH RAILING: A LITTLE
WALKING IN HOSPITAL ROOM: A LITTLE
MOBILITY SCORE: 18
MOBILITY SCORE: 18
DAILY ACTIVITIY SCORE: 23
MOVING TO AND FROM BED TO CHAIR: A LITTLE
WALKING IN HOSPITAL ROOM: A LITTLE
TURNING FROM BACK TO SIDE WHILE IN FLAT BAD: A LITTLE
DAILY ACTIVITIY SCORE: 24

## 2025-02-12 ASSESSMENT — PAIN SCALES - GENERAL
PAINLEVEL_OUTOF10: 0 - NO PAIN
PAINLEVEL_OUTOF10: 0 - NO PAIN

## 2025-02-12 ASSESSMENT — PAIN - FUNCTIONAL ASSESSMENT: PAIN_FUNCTIONAL_ASSESSMENT: 0-10

## 2025-02-12 ASSESSMENT — PAIN SCALES - WONG BAKER: WONGBAKER_NUMERICALRESPONSE: NO HURT

## 2025-02-12 NOTE — PROGRESS NOTES
Medicine PA-C follow up note    Subjective:  Seen with  at bedside, feeling much better than when she originally arrived. Still has some ongoing trouble hearing due to ear congestion. Weaned oxygen to 1L NC. Hopeful for early morning dc tomorrow     Additional information:      Vitals (Last 24 Hours):  Heart Rate:  [73-85]   Temp:  [36.2 °C (97.2 °F)-36.5 °C (97.7 °F)]   Resp:  [18]   BP: (129-139)/(63-71)   SpO2:  [92 %-98 %]       I have reviewed all imaging reports and labs pertinent to this visit / presenting problem    PHYSICAL EXAM:  Constitutional: NAD, alert and cooperative  Eyes: no icterus  ENMT: mucous membranes moist, no lesions  Head/Neck: supple  Respiratory/Thorax: Rhonchi heard bilaterally, + cough, on 1L NC  Cardiovascular: RRR, no murmurs heard  Gastrointestinal: ND/S/NT  : no Bartholomew, no SP/flank discomfort  Musculoskeletal: no joint swelling, ROM intact  Extremities: no edema  Neurological: non-focal  Skin: warm and dry  Psych: calm, stable mood     MEDS:  Scheduled meds  cefTRIAXone, 1 g, intravenous, q24h  enoxaparin, 40 mg, subcutaneous, q24h  fluticasone, 2 spray, Each Nostril, Daily  lisinopril, 20 mg, oral, Daily  nystatin, 5 mL, Swish & Swallow, TID  oseltamivir, 75 mg, oral, BID  pantoprazole, 40 mg, oral, Daily before breakfast   Or  pantoprazole, 40 mg, intravenous, Daily before breakfast  polyethylene glycol, 17 g, oral, Daily        Continuous meds       PRN meds  PRN medications: acetaminophen **OR** acetaminophen **OR** acetaminophen, benzonatate, guaiFENesin, ipratropium-albuteroL, melatonin, ondansetron **OR** ondansetron, oxygen, polyethylene glycol, traMADol      ASSESSMENT/PLAN:  Reta Davis is a 64 year old female with PMHx of asthma, appendiceal carcinoma s/p surgical resection who presented to [facility] ED after being found flu + and hypoxic at urgent care. She was subsequently admitted for further management of symptoms.     Pneumonia   Hypoxia   Influenza B  - CXR with  concerns for PNA   - CT PE - No PE, interval worsening of LLL infiltrate and left lung volume loss with associated bronchiectasis, CAROLYN and R lung with mild infiltrates  - Received azithromycin, on ceftriaxone   - S. Pneumo, legionella urine antigen negative   - Continue Tamiflu, PRN tessalon perles, flonase, mucinex, duo-nebs   - Wean oxygen as able, pt down to 1L today (2-12-25)    Transaminitis   - RUQ ultrasound unremarkable   - CT abd/pel with concerns for colitis but no reason for elevated LFTs   - Hepatitic viral panel negative   - LFTs down trending, continue to monitor    - Seen by GI who suspects DILI    HTN  - BP acceptable   - Continue home lisinopril     Prediabetes   - A1c 6.4  - Follow up with PCP     Other comorbidities as above  - Continue medications as ordered and adjust based on clinical course     VTE / GI prophylaxis   - Subcutaneous Lovenox, PPI, bowel regimen in place     Discharge planning  - HNN     Discussed with Dr. Luna and the interdisciplinary team     Jen Cardona PA-C

## 2025-02-12 NOTE — CARE PLAN
The patient's goals for the shift include  NO FALLS    The clinical goals for the shift include safety measured maintained      Problem: Safety - Adult  Goal: Free from fall injury  Outcome: Progressing     Problem: Discharge Planning  Goal: Discharge to home or other facility with appropriate resources  Outcome: Progressing     Problem: Chronic Conditions and Co-morbidities  Goal: Patient's chronic conditions and co-morbidity symptoms are monitored and maintained or improved  Outcome: Progressing     Problem: Nutrition  Goal: Nutrient intake appropriate for maintaining nutritional needs  Outcome: Progressing     Problem: Respiratory  Goal: Clear secretions with interventions this shift  Outcome: Progressing     Problem: Fall/Injury  Goal: Not fall by end of shift  Outcome: Progressing

## 2025-02-12 NOTE — PROGRESS NOTES
Reta Davis is a 64 y.o. female     LFTs improving  Down to 2 L of oxygen  Continues to have bilateral ear congestion and decreased hearing    Review of Systems     Constitutional: no fever, no chills, not feeling poorly, not feeling tired   Cardiovascular: no chest pain   Respiratory: Cough with shortness of breath  Gastrointestinal: no abdominal pain, no constipation, no melena, no nausea, no diarrhea, no vomiting and no blood in stools.   Neurological: no headache,   All other systems have been reviewed and are negative for complaint.       Vitals:    02/12/25 1230   BP:    Pulse:    Resp:    Temp:    SpO2: 95%        Scheduled medications  cefTRIAXone, 1 g, intravenous, q24h  enoxaparin, 40 mg, subcutaneous, q24h  fluticasone, 2 spray, Each Nostril, Daily  lisinopril, 20 mg, oral, Daily  nystatin, 5 mL, Swish & Swallow, TID  oseltamivir, 75 mg, oral, BID  pantoprazole, 40 mg, oral, Daily before breakfast   Or  pantoprazole, 40 mg, intravenous, Daily before breakfast  polyethylene glycol, 17 g, oral, Daily      Continuous medications     PRN medications  PRN medications: acetaminophen **OR** acetaminophen **OR** acetaminophen, benzonatate, guaiFENesin, ipratropium-albuteroL, melatonin, ondansetron **OR** ondansetron, oxygen, polyethylene glycol, traMADol    Lab Review   Results from last 7 days   Lab Units 02/12/25  0520 02/10/25  0538 02/09/25  1010   WBC AUTO x10*3/uL 11.1 12.8* 9.9   HEMOGLOBIN g/dL 11.9* 12.3 13.1   HEMATOCRIT % 37.5 37.5 39.1   PLATELETS AUTO x10*3/uL 296 290 261     Results from last 7 days   Lab Units 02/12/25  0520 02/11/25  0613 02/10/25  0538   SODIUM mmol/L 141 141 138   POTASSIUM mmol/L 4.3 4.2 3.9   CHLORIDE mmol/L 104 103 100   CO2 mmol/L 30 30 27   BUN mg/dL 16 22 36*   CREATININE mg/dL 0.70 0.75 0.87   CALCIUM mg/dL 8.8 8.9 9.2   PROTEIN TOTAL g/dL 6.7 6.8 7.5   BILIRUBIN TOTAL mg/dL 0.4 0.4 0.3   ALK PHOS U/L 129 153* 173*   ALT U/L 427* 731* 771*   AST U/L 41* 182* 496*    GLUCOSE mg/dL 132* 118* 245*            XR chest 2 views   Final Result   Streaky density in left lung base, which may be related to infiltrate   or atelectasis.             MACRO:   None        Signed by: Zully Curtis 2/11/2025 11:33 AM   Dictation workstation:   [id number]      CT abdomen pelvis w IV contrast   Final Result   Contracted thick-walled gallbladder. No significant biliary   dilatation.        Fluid distention of the right colon which could reflect nonspecific   colitis or ileus. Follow-up as clinically warranted.        Bibasilar bronchiectasis and partial consolidation of the visualized   left lung base.        Too small to characterize hypodensities in each kidney.        Additional findings as described above.        MACRO:   None.        Signed by: Stacia Maurice 2/10/2025 9:42 AM   Dictation workstation:   [id number]      US abdomen limited liver   Final Result   Unremarkable ultrasound of the right upper quadrant.        MACRO:   None.        Signed by: Vishal Almazan 2/9/2025 4:21 PM   Dictation workstation:   [id number]      CT angio chest for pulmonary embolism   Final Result   1. Interval worsening of left lower lobe infiltrate and left lung   volume loss with associated bronchiectasis when compared to previous   exam.   2. Interval development of left upper lobe and right lung milder   infiltrate as above. Clinical correlation and follow-up recommended   to document resolution.   3. No evidence for acute pulmonary embolic disease.             MACRO:   None        Signed by: Melinda Martins 2/8/2025 5:46 PM   Dictation workstation:   [id number]      XR chest 2 views   Final Result   1. Left basilar airspace disease and small effusion compatible with   pneumonia. Radiographic follow-up to resolution in 2-3 weeks is   advised.   2. Underlying emphysematous changes                  MACRO:   None        Signed by: Andrew Roper 2/8/2025 4:01 PM   Dictation workstation:   [id number]             Physical Exam    Constitutional   General appearance: Alert and in no acute distress.   Pulmonary   Respiratory assessment: On nasal cannula bilateral rhonchi  Cardiovascular   Auscultation of heart: Apical pulse normal, heart rate and rhythm normal, normal S1 and S2, no murmurs and no pericardial rub.    Exam for edema: No peripheral edema.   Abdomen   Abdominal Exam: No bruits, normal bowel sounds, soft, non-tender, no abdominal mass palpated.    Liver and Spleen exam: No hepato-splenomegaly.   Musculoskeletal     Inspection/palpation of joints, bones and muscles: No joint swelling. Normal movement of all extremities.   Neurologic   Cranial nerves: Nerves 2-12 were intact, no focal neuro defects.         Assessment/Plan      Influenza B  #Pneumonia  #Transaminitis  Liver functions continue to improve  Wean oxygen as able  Hopefully home tomorrow       #Transaminitis  LFTs slightly lower today  ?  TESSA I  LFTs are improving    #Hypertension  Stable continue home medications     *Hyperglycemia  A1c of 6.4 suggests prediabetes

## 2025-02-13 ENCOUNTER — PHARMACY VISIT (OUTPATIENT)
Dept: PHARMACY | Facility: CLINIC | Age: 65
End: 2025-02-13
Payer: COMMERCIAL

## 2025-02-13 VITALS
SYSTOLIC BLOOD PRESSURE: 132 MMHG | OXYGEN SATURATION: 95 % | HEIGHT: 65 IN | RESPIRATION RATE: 17 BRPM | BODY MASS INDEX: 25.16 KG/M2 | TEMPERATURE: 97.5 F | DIASTOLIC BLOOD PRESSURE: 72 MMHG | HEART RATE: 85 BPM | WEIGHT: 151 LBS

## 2025-02-13 LAB
ALBUMIN SERPL BCP-MCNC: 3 G/DL (ref 3.4–5)
ALP SERPL-CCNC: 116 U/L (ref 33–136)
ALT SERPL W P-5'-P-CCNC: 272 U/L (ref 7–45)
ANION GAP SERPL CALC-SCNC: 11 MMOL/L (ref 10–20)
AST SERPL W P-5'-P-CCNC: 18 U/L (ref 9–39)
BILIRUB SERPL-MCNC: 0.5 MG/DL (ref 0–1.2)
BUN SERPL-MCNC: 13 MG/DL (ref 6–23)
CALCIUM SERPL-MCNC: 8.6 MG/DL (ref 8.6–10.3)
CHLORIDE SERPL-SCNC: 103 MMOL/L (ref 98–107)
CO2 SERPL-SCNC: 31 MMOL/L (ref 21–32)
CREAT SERPL-MCNC: 0.65 MG/DL (ref 0.5–1.05)
EGFRCR SERPLBLD CKD-EPI 2021: >90 ML/MIN/1.73M*2
ERYTHROCYTE [DISTWIDTH] IN BLOOD BY AUTOMATED COUNT: 12.4 % (ref 11.5–14.5)
GLUCOSE SERPL-MCNC: 116 MG/DL (ref 74–99)
HCT VFR BLD AUTO: 36.6 % (ref 36–46)
HGB BLD-MCNC: 11.6 G/DL (ref 12–16)
MCH RBC QN AUTO: 30.6 PG (ref 26–34)
MCHC RBC AUTO-ENTMCNC: 31.7 G/DL (ref 32–36)
MCV RBC AUTO: 97 FL (ref 80–100)
NRBC BLD-RTO: 0 /100 WBCS (ref 0–0)
PLATELET # BLD AUTO: 310 X10*3/UL (ref 150–450)
POTASSIUM SERPL-SCNC: 4.9 MMOL/L (ref 3.5–5.3)
PROT SERPL-MCNC: 6 G/DL (ref 6.4–8.2)
RBC # BLD AUTO: 3.79 X10*6/UL (ref 4–5.2)
SODIUM SERPL-SCNC: 140 MMOL/L (ref 136–145)
WBC # BLD AUTO: 10.6 X10*3/UL (ref 4.4–11.3)

## 2025-02-13 PROCEDURE — RXMED WILLOW AMBULATORY MEDICATION CHARGE

## 2025-02-13 PROCEDURE — 85027 COMPLETE CBC AUTOMATED: CPT | Performed by: INTERNAL MEDICINE

## 2025-02-13 PROCEDURE — 36415 COLL VENOUS BLD VENIPUNCTURE: CPT | Performed by: INTERNAL MEDICINE

## 2025-02-13 PROCEDURE — 80053 COMPREHEN METABOLIC PANEL: CPT | Performed by: INTERNAL MEDICINE

## 2025-02-13 PROCEDURE — 99239 HOSP IP/OBS DSCHRG MGMT >30: CPT | Performed by: INTERNAL MEDICINE

## 2025-02-13 PROCEDURE — 2500000002 HC RX 250 W HCPCS SELF ADMINISTERED DRUGS (ALT 637 FOR MEDICARE OP, ALT 636 FOR OP/ED): Performed by: PHARMACIST

## 2025-02-13 PROCEDURE — 2500000004 HC RX 250 GENERAL PHARMACY W/ HCPCS (ALT 636 FOR OP/ED): Performed by: INTERNAL MEDICINE

## 2025-02-13 PROCEDURE — 2500000001 HC RX 250 WO HCPCS SELF ADMINISTERED DRUGS (ALT 637 FOR MEDICARE OP): Performed by: INTERNAL MEDICINE

## 2025-02-13 RX ORDER — AMOXICILLIN AND CLAVULANATE POTASSIUM 875; 125 MG/1; MG/1
1 TABLET, FILM COATED ORAL 2 TIMES DAILY
Qty: 4 TABLET | Refills: 0 | Status: SHIPPED | OUTPATIENT
Start: 2025-02-13 | End: 2025-02-15

## 2025-02-13 RX ORDER — NYSTATIN 100000 [USP'U]/ML
5 SUSPENSION ORAL 2 TIMES DAILY
Qty: 60 ML | Refills: 0 | Status: SHIPPED | OUTPATIENT
Start: 2025-02-13

## 2025-02-13 RX ADMIN — LISINOPRIL 20 MG: 20 TABLET ORAL at 08:45

## 2025-02-13 RX ADMIN — PANTOPRAZOLE SODIUM 40 MG: 40 TABLET, DELAYED RELEASE ORAL at 06:16

## 2025-02-13 RX ADMIN — FLUTICASONE PROPIONATE 2 SPRAY: 50 SPRAY, METERED NASAL at 08:46

## 2025-02-13 RX ADMIN — NYSTATIN 500000 UNITS: 100000 SUSPENSION ORAL at 08:46

## 2025-02-13 RX ADMIN — OSELTAMAVIR PHOSPHATE 75 MG: 75 CAPSULE ORAL at 08:45

## 2025-02-13 ASSESSMENT — COGNITIVE AND FUNCTIONAL STATUS - GENERAL
DAILY ACTIVITIY SCORE: 23
DRESSING REGULAR LOWER BODY CLOTHING: A LITTLE
MOVING FROM LYING ON BACK TO SITTING ON SIDE OF FLAT BED WITH BEDRAILS: A LITTLE
DAILY ACTIVITIY SCORE: 23
STANDING UP FROM CHAIR USING ARMS: A LITTLE
CLIMB 3 TO 5 STEPS WITH RAILING: A LITTLE
WALKING IN HOSPITAL ROOM: A LITTLE
WALKING IN HOSPITAL ROOM: A LITTLE
STANDING UP FROM CHAIR USING ARMS: A LITTLE
MOVING TO AND FROM BED TO CHAIR: A LITTLE
MOBILITY SCORE: 18
MOVING TO AND FROM BED TO CHAIR: A LITTLE
MOBILITY SCORE: 18
TURNING FROM BACK TO SIDE WHILE IN FLAT BAD: A LITTLE
DRESSING REGULAR LOWER BODY CLOTHING: A LITTLE
MOVING FROM LYING ON BACK TO SITTING ON SIDE OF FLAT BED WITH BEDRAILS: A LITTLE
TURNING FROM BACK TO SIDE WHILE IN FLAT BAD: A LITTLE
CLIMB 3 TO 5 STEPS WITH RAILING: A LITTLE

## 2025-02-13 ASSESSMENT — PAIN SCALES - WONG BAKER
WONGBAKER_NUMERICALRESPONSE: NO HURT
WONGBAKER_NUMERICALRESPONSE: NO HURT

## 2025-02-13 ASSESSMENT — PAIN SCALES - GENERAL
PAINLEVEL_OUTOF10: 0 - NO PAIN
PAINLEVEL_OUTOF10: 0 - NO PAIN

## 2025-02-13 ASSESSMENT — PAIN - FUNCTIONAL ASSESSMENT: PAIN_FUNCTIONAL_ASSESSMENT: 0-10

## 2025-02-13 NOTE — NURSING NOTE
Pt was educated on importance of bed alarm. Pt. States she ambulates well and would not like bed alarm to be on.

## 2025-02-13 NOTE — CARE PLAN
The patient's goals for the shift include      The clinical goals for the shift include Patient will tolerate O2 weaning    Over the shift, the patient did not make progress toward the following goals. Barriers to progression include . Recommendations to address these barriers include .

## 2025-02-13 NOTE — PROGRESS NOTES
02/13/25 0919   Discharge Planning   Expected Discharge Disposition Home     Once med ready plan is to discharge home.

## 2025-02-13 NOTE — DISCHARGE SUMMARY
Discharge Diagnosis  Pneumonia due to infectious organism, unspecified laterality, unspecified part of lung    Issues Requiring Follow-Up  Follow-up with PCP    Test Results Pending At Discharge  Pending Labs       No current pending labs.            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 was doing well until a few days ago when she started having upper respite tract symptoms  The patient developed a cough with productive phlegm and worsening shortness of breath along with loss of appetite  She went to the urgent care center where she tested positive for the influenza and was also noted to be hypoxic therefore she was advised admission to the hospital  We started the patient on Tamiflu and because of infiltrates in the lungs also added Rocephin and Zithromax  The patient had elevated liver function tests and with a history of appendiceal carcinoma we got imaging of the abdomen and pelvis done which came back normal  A GI consult was obtained and their opinion was that the elevated LFTs was because of DI LI  This was confirmed with resolution of transaminitis as her infections got better  Patient has significant congestion in the nasal passages because of which she is has decreased hearing  There was no otoscope available in the hospital so I could not do an ear exam  Regardless the patient's symptoms have improved with the antibiotics and we have been able to wean her off oxygen  Patient's blood sugar was borderline and we got an A1c which was 6.4 suggesting prediabetes    Will discharge the patient home in stable condition with outpatient follow-up    Discharge diagnosis  Influenza pneumonia  Hypertension  History of appendiceal carcinoma  Transaminitis  Prediabetes    Pertinent Physical Exam At Time of Discharge  Physical Exam    Constitutional   General appearance: Alert and in no acute distress.     Pulmonary    Respiratory assessment: No respiratory distress, normal respiratory rhythm and effort.    Auscultation of Lungs: Clear bilateral breath sounds.   Cardiovascular   Auscultation of heart: Apical pulse normal, heart rate and rhythm normal, normal S1 and S2, no murmurs and no pericardial rub.    Exam for edema: No peripheral edema.   Abdomen   Abdominal Exam: No bruits, normal bowel sounds, soft, non-tender, no abdominal mass palpated.    Liver and Spleen exam: No hepato-splenomegaly.   Musculoskeletal   Examination of gait: Normal.    Inspection of digits and nails: No clubbing or cyanosis of the fingernails.    Inspection/palpation of joints, bones and muscles: No joint swelling. Normal movement of all extremities.   Skin   Skin inspection: Normal skin color and pigmentation, normal skin turgor and no visible rash.   Neurologic   Cranial nerves: Nerves 2-12 were intact, no focal neuro defects.    Home Medications     Medication List      START taking these medications     amoxicillin-pot clavulanate 875-125 mg tablet; Commonly known as:   Augmentin; Take 1 tablet by mouth 2 times a day for 2 days.   nystatin 100,000 unit/mL suspension; Commonly known as: Mycostatin; Take   5 mL (500,000 Units) by mouth 2 times a day. Swish in mouth and spit out.     CONTINUE taking these medications     acetaminophen 160 mg/5 mL (5 mL) suspension; Commonly known as: Tylenol   albuterol 90 mcg/actuation inhaler; Inhale 1 puff every 4 hours if   needed for wheezing.   cholecalciferol 125 mcg (5000 UT) capsule; Commonly known as: Vitamin   D-3   lisinopril 20 mg tablet; Take 1 tablet (20 mg) by mouth once daily.   multivitamin tablet       Outpatient Follow-Up  Future Appointments   Date Time Provider Department [place]   4/4/2025 10:00 AM Kenia Bingham MD [id number] [facility]     Patient seen at bedside. Events from the last visit reviewed. Discussed with staff. Results of tests and investigations from last visit reviewed and discussed with  patient/Family. Electronic chart on [facility] reviewed. Input / Recommendations  from consultants  appreciated and reviewed and agreed with.     discharge summary and profile completed. medications reviewed and discussed with patient and family.  scripts completed and signed.     total discharge time in excess of 30 minutes.    Edita Luna MD

## 2025-02-13 NOTE — CARE PLAN
The patient's goals for the shift include      The clinical goals for the shift include pt will remain safe and comfortable throughout shift    Over the shift, the patient did make progress toward the following goals.

## 2025-02-14 ENCOUNTER — PATIENT OUTREACH (OUTPATIENT)
Dept: CARE COORDINATION | Facility: CLINIC | Age: 65
End: 2025-02-14
Payer: MEDICARE

## 2025-02-17 ENCOUNTER — PATIENT OUTREACH (OUTPATIENT)
Dept: CARE COORDINATION | Facility: CLINIC | Age: 65
End: 2025-02-17
Payer: MEDICARE

## 2025-02-17 SDOH — ECONOMIC STABILITY: FOOD INSECURITY
ARE ANY OF YOUR NEEDS URGENT? FOR EXAMPLE, UNCERTAINTY OF WHERE YOU WILL GET YOUR NEXT MEAL OR NOT HAVING THE MEDICATIONS YOU NEED TO TAKE TOMORROW.: NO

## 2025-02-17 SDOH — ECONOMIC STABILITY: GENERAL: WOULD YOU LIKE HELP WITH ANY OF THE FOLLOWING NEEDS?: I DONT NEED HELP WITH ANY OF THESE

## 2025-02-17 NOTE — PROGRESS NOTES
"Outreach call to patient to support a smooth transition of care from recent admission.  Spoke with patient, reviewed discharge medications, discharge instructions, assessed social needs, and provided education on importance of follow-up appointment with provider.  Will continue to monitor through transition period.  Mrs. Davis \"feeling a little better ,but feeling weak.\"   No fever, sob. Advised it can take weeks to feel back to base line to recovery from PNA.     Called PCP office, thank you. They will move up appointment .     Discharge Diagnosis  Pneumonia due to infectious organism, unspecified laterality, unspecified part of lung     Issues Requiring Follow-Up  Follow-up with PCP      Medications  Medications reviewed with patient/caregiver?: Yes (2/17/2025 10:36 AM)  Is the patient having any side effects they believe may be caused by any medication additions or changes?: No (2/17/2025 10:36 AM)  Care Management Interventions: Provided patient education (2/17/2025 10:36 AM)  Is the patient taking all medications as directed (includes completed medication regime)?: Yes (2/17/2025 10:36 AM)  Care Management Interventions: Provided patient education (2/17/2025 10:36 AM)  Follow Up Tasks: -- (need to move up PCP) (2/17/2025 10:36 AM)    Appointments  Does the patient have a primary care provider?: Yes (2/17/2025 10:36 AM)  Has the patient kept scheduled appointments due by today?: Yes (2/17/2025 10:36 AM)  Care Management Interventions: Advised patient to keep appointment (2/17/2025 10:36 AM)    Self Management  What is the home health agency?: none for Mrs. Hrovat (2/17/2025 10:36 AM)  Has home health visited the patient within 72 hours of discharge?: Not applicable (2/17/2025 10:36 AM)    Patient Teaching  Does the patient have access to their discharge instructions?: Yes (2/17/2025 10:36 AM)  Care Management Interventions: Reviewed instructions with patient (2/17/2025 10:36 AM)  Is the patient/caregiver able " to teach back the hierarchy of who to call/visit for symptoms/problems? PCP, Specialist, Home Health nurse, Urgent Care, ED, 911: Yes (2/17/2025 10:36 AM)        Thank you,   Rosa Summers , RN   Nurse Care Manager   [facility] Care Department   [phone]

## 2025-02-19 ENCOUNTER — OFFICE VISIT (OUTPATIENT)
Dept: PRIMARY CARE | Facility: CLINIC | Age: 65
End: 2025-02-19
Payer: MEDICARE

## 2025-02-19 VITALS
WEIGHT: 152.8 LBS | TEMPERATURE: 97.1 F | DIASTOLIC BLOOD PRESSURE: 77 MMHG | HEART RATE: 100 BPM | HEIGHT: 65 IN | OXYGEN SATURATION: 98 % | SYSTOLIC BLOOD PRESSURE: 139 MMHG | BODY MASS INDEX: 25.46 KG/M2

## 2025-02-19 DIAGNOSIS — Z09 HOSPITAL DISCHARGE FOLLOW-UP: Primary | ICD-10-CM

## 2025-02-19 DIAGNOSIS — E78.5 HYPERLIPIDEMIA, MILD: ICD-10-CM

## 2025-02-19 DIAGNOSIS — D64.9 ANEMIA, UNSPECIFIED TYPE: ICD-10-CM

## 2025-02-19 DIAGNOSIS — Z76.89 ENCOUNTER TO ESTABLISH CARE WITH NEW DOCTOR: ICD-10-CM

## 2025-02-19 DIAGNOSIS — H91.93 DECREASED HEARING OF BOTH EARS: ICD-10-CM

## 2025-02-19 DIAGNOSIS — R73.03 PREDIABETES: ICD-10-CM

## 2025-02-19 DIAGNOSIS — I10 BENIGN ESSENTIAL HYPERTENSION: ICD-10-CM

## 2025-02-19 DIAGNOSIS — R79.89 ELEVATED LFTS: ICD-10-CM

## 2025-02-19 PROBLEM — J18.9 PNEUMONIA DUE TO INFECTIOUS ORGANISM, UNSPECIFIED LATERALITY, UNSPECIFIED PART OF LUNG: Status: RESOLVED | Noted: 2025-02-08 | Resolved: 2025-02-19

## 2025-02-19 PROBLEM — E04.1 THYROID NODULE: Status: RESOLVED | Noted: 2023-02-27 | Resolved: 2025-02-19

## 2025-02-19 PROBLEM — F33.9 DEPRESSION, RECURRENT (CMS-HCC): Status: RESOLVED | Noted: 2023-04-14 | Resolved: 2025-02-19

## 2025-02-19 PROCEDURE — 3078F DIAST BP <80 MM HG: CPT | Performed by: STUDENT IN AN ORGANIZED HEALTH CARE EDUCATION/TRAINING PROGRAM

## 2025-02-19 PROCEDURE — 1160F RVW MEDS BY RX/DR IN RCRD: CPT | Performed by: STUDENT IN AN ORGANIZED HEALTH CARE EDUCATION/TRAINING PROGRAM

## 2025-02-19 PROCEDURE — 1036F TOBACCO NON-USER: CPT | Performed by: STUDENT IN AN ORGANIZED HEALTH CARE EDUCATION/TRAINING PROGRAM

## 2025-02-19 PROCEDURE — 1159F MED LIST DOCD IN RCRD: CPT | Performed by: STUDENT IN AN ORGANIZED HEALTH CARE EDUCATION/TRAINING PROGRAM

## 2025-02-19 PROCEDURE — 99495 TRANSJ CARE MGMT MOD F2F 14D: CPT | Performed by: STUDENT IN AN ORGANIZED HEALTH CARE EDUCATION/TRAINING PROGRAM

## 2025-02-19 PROCEDURE — 3008F BODY MASS INDEX DOCD: CPT | Performed by: STUDENT IN AN ORGANIZED HEALTH CARE EDUCATION/TRAINING PROGRAM

## 2025-02-19 PROCEDURE — 1123F ACP DISCUSS/DSCN MKR DOCD: CPT | Performed by: STUDENT IN AN ORGANIZED HEALTH CARE EDUCATION/TRAINING PROGRAM

## 2025-02-19 PROCEDURE — 1111F DSCHRG MED/CURRENT MED MERGE: CPT | Performed by: STUDENT IN AN ORGANIZED HEALTH CARE EDUCATION/TRAINING PROGRAM

## 2025-02-19 PROCEDURE — 3075F SYST BP GE 130 - 139MM HG: CPT | Performed by: STUDENT IN AN ORGANIZED HEALTH CARE EDUCATION/TRAINING PROGRAM

## 2025-02-19 ASSESSMENT — ENCOUNTER SYMPTOMS
ABDOMINAL PAIN: 0
CHILLS: 0
NAUSEA: 0
DIFFICULTY URINATING: 0
HEADACHES: 0
FATIGUE: 0
DIZZINESS: 0
VOMITING: 0
OCCASIONAL FEELINGS OF UNSTEADINESS: 0
COLOR CHANGE: 0
WHEEZING: 0
ADENOPATHY: 0
SHORTNESS OF BREATH: 0
DEPRESSION: 1
DIARRHEA: 0
FEVER: 0
COUGH: 0
CONSTIPATION: 0

## 2025-02-19 ASSESSMENT — PATIENT HEALTH QUESTIONNAIRE - PHQ9
2. FEELING DOWN, DEPRESSED OR HOPELESS: NOT AT ALL
SUM OF ALL RESPONSES TO PHQ9 QUESTIONS 1 AND 2: 0
1. LITTLE INTEREST OR PLEASURE IN DOING THINGS: NOT AT ALL

## 2025-02-19 NOTE — ASSESSMENT & PLAN NOTE
Acute issue, uncontrolled, initial encounter. DDx sensorineural hearing loss vs labyrinthitis vs post viral effusion vs other. Would benefit from audiology and ENT eval.

## 2025-02-19 NOTE — ASSESSMENT & PLAN NOTE
New issue, uncontrolled, initial encounter. Aggressive lifestyle changes, trial for 3-6 months, if no improvement will start metformin. Gave handout for diet changes.

## 2025-02-19 NOTE — PROGRESS NOTES
"  [facility] - [facility]  [address], [address]  [place] [address]    Subjective     Patient ID: Reta Davis is a 64 y.o. female who presents for  Establish Care (Lost hearing with the flu)     Hospital admission: 2/9/2025  Hospital discharge: 2/13/2025  Admission dx: Pneumonia due to infectious organism  Discharge Dx: Influenza pneumonia, HTN, transaminitis, prediabetes    Pt est care today, this is their first visit with me.     No SOB, wheezing, no body aches. She is feeling better, feeling stronger.    After she got sick, her left ear was \"clogged\" and then both ears started to get worse. Now it has stayed the same. She was still having some muffled, and decreased hearing sensation. She can't hear people very well. Prior to this her hearing was good and she didn't have any problems.      Review of Systems   Constitutional:  Negative for chills, fatigue and fever.   HENT:  Negative for congestion.    Eyes:  Negative for visual disturbance.   Respiratory:  Negative for cough, shortness of breath and wheezing.    Cardiovascular:  Negative for chest pain.   Gastrointestinal:  Negative for abdominal pain, constipation, diarrhea, nausea and vomiting.   Genitourinary:  Negative for difficulty urinating.   Musculoskeletal:  Negative for gait problem.   Skin:  Negative for color change.   Neurological:  Negative for dizziness, syncope and headaches.   Hematological:  Negative for adenopathy.       Social History     Tobacco Use    Smoking status: Never     Passive exposure: Never    Smokeless tobacco: Never   Vaping Use    Vaping status: Never Used   Substance Use Topics    Alcohol use: Not Currently    Drug use: Not Currently     Family History   Problem Relation Name Age of Onset    Breast cancer Mother  63    Heart failure Mother      Hypertension Mother      Stroke Mother      Heart attack Mother      Other (malignant neoplasm of breast) Mother      Heart attack Father      Stroke Father      " "Hypertension Father      Hypertension Sister       Allergies   Allergen Reactions    Antihistamine [Diphenhydramine Hcl] Unknown     Pt states \"I have allergy induced asthma and the antihistamines make my saliva really thick\"    Levofloxacin Other and Swelling    Prednisolone Other     thrush    Tetracyclines Rash and Unknown        Current Outpatient Medications   Medication Sig Dispense Refill    cholecalciferol (Vitamin D-3) 125 MCG (5000 UT) capsule Take 1 capsule (125 mcg) by mouth once daily.      lisinopril 20 mg tablet Take 1 tablet (20 mg) by mouth once daily. 90 tablet 1    multivitamin tablet Take 1 tablet by mouth once daily.      acetaminophen (Tylenol) 160 mg/5 mL (5 mL) suspension Take 15 mL (480 mg) by mouth every 4 hours if needed for mild pain (1 - 3) or fever (temp greater than 38.0 C).      albuterol 90 mcg/actuation inhaler Inhale 1 puff every 4 hours if needed for wheezing. 18 g 3    nystatin (Mycostatin) 100,000 unit/mL suspension Take 5 mL (500,000 Units) by mouth 2 times a day. Swish in mouth and spit out. 60 mL 0     No current facility-administered medications for this visit.       /77 (BP Location: Right arm, Patient Position: Sitting, BP Cuff Size: Adult)   Pulse 100   Temp 36.2 °C (97.1 °F) (Temporal)   Ht 1.651 m (5' 5\")   Wt 69.3 kg (152 lb 12.8 oz)   LMP 01/01/2008 (Approximate)   SpO2 98%   BMI 25.43 kg/m²      Objective   Physical Exam  Vitals reviewed.   Constitutional:       Appearance: Normal appearance.   HENT:      Right Ear: Tympanic membrane, ear canal and external ear normal. There is no impacted cerumen.      Left Ear: Tympanic membrane, ear canal and external ear normal. There is no impacted cerumen.   Eyes:      Extraocular Movements: Extraocular movements intact.      Pupils: Pupils are equal, round, and reactive to light.   Cardiovascular:      Rate and Rhythm: Normal rate and regular rhythm.      Pulses: Normal pulses.      Heart sounds: Normal heart " "sounds.   Pulmonary:      Effort: Pulmonary effort is normal.      Breath sounds: Normal breath sounds.   Musculoskeletal:         General: Normal range of motion.      Cervical back: Normal range of motion and neck supple.   Neurological:      General: No focal deficit present.      Mental Status: She is alert.   Psychiatric:         Mood and Affect: Mood normal.         Behavior: Behavior normal.           Labs:   Lab Results   Component Value Date    WBC 10.6 02/13/2025    HGB 11.6 (L) 02/13/2025    HCT 36.6 02/13/2025     02/13/2025    TSH 1.97 02/18/2023    INR 1.0 03/26/2021     Lab Results   Component Value Date     02/13/2025    K 4.9 02/13/2025     02/13/2025    BUN 13 02/13/2025    CREATININE 0.65 02/13/2025    GLUCOSE 116 (H) 02/13/2025    CALCIUM 8.6 02/13/2025    PROT 6.0 (L) 02/13/2025    BILITOT 0.5 02/13/2025    ALKPHOS 116 02/13/2025    AST 18 02/13/2025     (H) 02/13/2025     Lab Results   Component Value Date    CHOL 233 (H) 09/30/2024    CHOL 212 (H) 04/18/2024    CHOL 244 (H) 11/07/2023      Lab Results   Component Value Date    TRIG 107 09/30/2024    TRIG 115 04/18/2024    TRIG 87 11/07/2023      Lab Results   Component Value Date    HDL 61.4 09/30/2024    HDL 60.6 04/18/2024    HDL 77.8 11/07/2023     Lab Results   Component Value Date    LDLCALC 150 (H) 09/30/2024    LDLCALC 128 (H) 04/18/2024    LDLCALC 149 (H) 11/07/2023      Lab Results   Component Value Date    VLDL 21 09/30/2024    VLDL 23 04/18/2024    VLDL 17 11/07/2023    No components found for: \"CHOLHDLRATI0\"    No data recorded    Imaging/Testing: ECG 12 lead  Normal sinus rhythm  Possible Left atrial enlargement  Borderline ECG  When compared with ECG of 26-MAR-2021 12:29,  No significant change was found  XR chest 2 views  Narrative: Interpreted By:  Zully Curtis,   STUDY:  XR CHEST 2 VIEWS;  2/11/2025 11:08 am      INDICATION:  Signs/Symptoms:sob.      COMPARISON:  02/08/2025      ACCESSION " NUMBER(S):  [id number]      ORDERING CLINICIAN:  BRENT CHAND      FINDINGS:  streaky density is noted in the left lung base. The heart is not  enlarged. No pleural effusion or pneumothorax is noted.      Impression: Streaky density in left lung base, which may be related to infiltrate  or atelectasis.          MACRO:  None      Signed by: Zully Curtis 2/11/2025 11:33 AM  Dictation workstation:   [id number]    Assessment/Plan   Problem List Items Addressed This Visit       Benign essential hypertension     Chronic issue, stable, initial encounter. Continue with lisinopril.         Hyperlipidemia, mild     Chronic issue, stable, initial encounter.          Decreased hearing of both ears     Acute issue, uncontrolled, initial encounter. DDx sensorineural hearing loss vs labyrinthitis vs post viral effusion vs other. Would benefit from audiology and ENT eval.          Relevant Orders    Referral to ENT     Other Visit Diagnoses       Hospital discharge follow-up    -  Primary    Elevated LFTs        Relevant Orders    Comprehensive Metabolic Panel    Anemia, unspecified type        Relevant Orders    CBC and Auto Differential    Encounter to establish care with new doctor                  orders and follow up as documented in EMR, lab results reviewed with patient, repeat labs ordered prior to next appointment, reviewed compliance with lifestyle measures, reviewed diet, exercise and weight control, reviewed medications and side effects in detail     Return visit in 1 months.      Note: If blood pressure was rechecked on this appointment, an updated after visit summary can be accessed by the patient on [employer] with this information.         SHERRY DC MD, [facility]  Department of Family Medicine of [facility] - Primary Care    Disclaimer: Note that this documentation was dictated utilizing voice recognition software. This software can occasionally cause typographical errors within  the documentation such as errors in the vocabulary, misspellings, and grammatical issues.  If there are any questions or need for clarification, please contact the provider for more information.

## 2025-02-21 ENCOUNTER — TELEPHONE (OUTPATIENT)
Dept: OTOLARYNGOLOGY | Facility: HOSPITAL | Age: 65
End: 2025-02-21

## 2025-02-21 ENCOUNTER — CLINICAL SUPPORT (OUTPATIENT)
Dept: AUDIOLOGY | Facility: CLINIC | Age: 65
End: 2025-02-21
Payer: MEDICARE

## 2025-02-21 DIAGNOSIS — H69.93 DYSFUNCTION OF BOTH EUSTACHIAN TUBES: Primary | ICD-10-CM

## 2025-02-21 DIAGNOSIS — H90.6 MIXED CONDUCTIVE AND SENSORINEURAL HEARING LOSS OF BOTH EARS: ICD-10-CM

## 2025-02-21 LAB
ATRIAL RATE: 97 BPM
P AXIS: 68 DEGREES
P OFFSET: 195 MS
P ONSET: 143 MS
PR INTERVAL: 158 MS
Q ONSET: 222 MS
QRS COUNT: 16 BEATS
QRS DURATION: 78 MS
QT INTERVAL: 310 MS
QTC CALCULATION(BAZETT): 393 MS
QTC FREDERICIA: 363 MS
R AXIS: 41 DEGREES
T AXIS: 46 DEGREES
T OFFSET: 377 MS
VENTRICULAR RATE: 97 BPM

## 2025-02-21 PROCEDURE — 92567 TYMPANOMETRY: CPT

## 2025-02-21 PROCEDURE — 92557 COMPREHENSIVE HEARING TEST: CPT

## 2025-02-21 ASSESSMENT — PAIN SCALES - GENERAL: PAINLEVEL_OUTOF10: 0 - NO PAIN

## 2025-02-21 ASSESSMENT — PAIN - FUNCTIONAL ASSESSMENT: PAIN_FUNCTIONAL_ASSESSMENT: 0-10

## 2025-02-21 NOTE — PROGRESS NOTES
AUDIOLOGY ADULT AUDIOMETRIC EVALUATION     Name: Reta Davis   : 1960 Age: 64 y.o.   Date of Evaluation: 2025 Time: 5520-1938     IMPRESSIONS   Mild to profound mixed hearing loss in both ears. Decrease in thresholds (air conduction more than bone conduction) and word recognition in both ears since last evaluation on 2018.  Word recognition in quiet is poor in both ears.  Tympanometry indicates restricted eardrum mobility consistent with outer/middle ear involvement in both ears.   Amplification needs: Need for amplification will be reassessed following medical intervention.    Today's test results are hearing loss requiring medical/otologic and audiologic follow-up.     RECOMMENDATIONS   Continue medical follow up with primary care provider and/or Ears Nose and Throat (ENT) provider as recommended.  Follow-up with Ears Nose and Throat (ENT) due to middle ear dysfunction and significant change in hearing loss with illness.   Return for audiologic evaluation following medical management of middle ear dysfunction to monitor hearing sensitivity and assess middle ear status or sooner should concerns arise. The audiology department can be reached at [phone] to schedule an appointment.   Avoid exposure to loud sounds by moving away from the noise, turning down the volume, or wearing proper hearing protection correctly.  Consider use of good communication strategies. These include but are not limited to the following: get Reta's attention before speaking to her, close the distance between Reta and who is speaking, limit background noise, allow Reta access to visual cues (i.e. facial expressions/mouth movements, pictures, written instructions, etc.). When in situations where background noise cannot be avoided, position yourself so that the background noise is to your back, and you communication partner is seated in front of you, ideally with a quiet area or wall behind them.     HISTORY   History  "obtained from patient report and chart review; please see medical records for complete history. Ms. Davis was seen today for a follow-up audiologic evaluation at the request of Kenia Bingham MD.    Reason for visit: Concerns for change in hearing in the left ear greater than the right ear since recent flu infection  Change in Hearing: yes in the left ear greater than the right ear  Tinnitus: yes in both ears; constant, non-bothersome, non-pulsatile, and described as ringing sensation. Notes very rare occasion that it will not be there .  Otalgia: yes in the left ear intermittently, not currently   Aural Pressure/Fullness: yes in both ears; feels like she has hearing protection on or that her ears need to pop  Recent Ear Infections/Otorrhea: denied  Dizziness: denied  History of Ear Surgeries: denied  Family History of Hearing Loss: denied  Falls within the last year: denied  Other Significant History: denied    Previous audiometric testing performed on 1/26/2018 revealed normal hearing sensitivity sloping to a moderate sensorineural hearing loss bilaterally with excellent word recognition ability. Normal middle ear pressure and admittance bilaterally. Present extended high frequency responses 9000-91408 Hz and absent 57362-97210 Hz.    Recall from previous encounter in the audiology department on 1/26/2018: History was obtained from patient: Ms. Davis was seen on order from Lionel Marquez MD in oncology for audiometric evaluation prior to chemotherapy treatment. She reported having already received one chemotherapy treatment and is scheduled for continued treatment cancer. She reported no significant concerns for hearing loss but did report constant tinnitus. She denied otalgia, otorrhea, aural fullness, dizziness, prior otologic surgery, and family history of hearing loss.    Recall from previous encounter with Kenia Bingham MD on 2/19/2025: After she got sick, her left ear was \"clogged\" and then both ears " "started to get worse. Now it has stayed the same. She was still having some muffled, and decreased hearing sensation. She can't hear people very well. Prior to this her hearing was good and she didn't have any problems.    EVALUATION AND PATIENT EDUCATION   The following is a brief interpretation of the obtained findings from the audiologic evaluation. Discussed results and recommendations with Ms. Davis and spouse. Questions were addressed and the patient was encouraged to contact our department at [phone] should concerns arise.     TEST RESULTS - See scanned audiogram in \"Media.\"   Otoscopic Evaluation:   Right Ear: Ear canal clear, tympanic membrane visualized.   Left Ear: Ear canal clear, tympanic membrane visualized.       Tympanometry (226 Hz): An objective evaluation of middle ear function. CPT code: 43808   Right Ear: Type B, restricted eardrum mobility consistent with outer/middle ear involvement.   Left Ear: Type B, restricted eardrum mobility consistent with outer/middle ear involvement.       Acoustic Reflexes: An objective measure of auditory and facial nerve pathways.   (Probe) Right Ear (ipsi right stimulus ear; contralateral left stimulus ear):   (Ipsilateral) Responses absent at 500-2000 Hz.   (Probe) Left Ear (ipsi left stimulus ear; contralateral right stimulus ear):   (Ipsilateral) Responses absent at 500-2000 Hz.       Distortion Product Otoacoustic Emissions (DPOAE): An objective measurement of responses generated by the cochlea when simultaneously stimulated by two pure tone frequencies. CPT code: 38015   Right Ear: Did not test due to abnormal middle ear status.    Left Ear: Did not test due to abnormal middle ear status.    Present OAEs suggest normal or near cochlear outer hair cell function for corresponding frequency region(s). Absent OAEs with normal middle ear function can be consistent with some degree of hearing loss. Assessment of cochlear outer hair cell function may be " impacted by outer or middle ear function.       Test technique: Conventional Audiometry via insert earphones.   An evaluation of hearing sensitivity via air and bone conduction and speech recognition. CPT code: 54227    Reliability: good       Pure Tone Audiometry:    Right Ear: Mild to moderate mixed hearing loss for 125-1000 Hz, sloping to moderately-severe to profound through 8000 Hz. Air-bone gaps of 15-20 dB present at 500 and 1000 Hz; could not mask bone at 4000 Hz.   Left Ear: Mild to moderate mixed hearing loss for 125-1000 Hz, sloping to moderately-severe to profound through 8000 Hz. Air-bone gaps of 15-25 dB present at 500, 1000 and 2000 Hz; could not mask bone at 4000 Hz.       Speech Audiometry:    Right Ear: Speech Reception Threshold (SRT) was obtained at 50 dB HL. This is in good agreement with two frequency Pure Tone Average (PTA).  Word Recognition scores in quiet were poor (56%) when words were presented at 90 dB HL. These results are based on [facility] Auditory Test No.6 (NU-6) (N=25) and contralateral masking was presented at 50 dB HL.   Left Ear: Speech Reception Threshold (SRT) was obtained at 50 dB HL. This is in good agreement with two frequency Pure Tone Average (PTA).  Word Recognition scores in quiet were poor (68%) when words were presented at 90 dB HL. These results are based on [facility] Auditory Test No.6 (NU-6) (N=25) and contralateral masking was presented at 50 dB HL.          Eloisa Stewart, AUD, CCC-A  Licensed Clinical Audiologist    Degree of Hearing Decibel Range  Key   Within Normal Limits 0-20  CNT Could Not Test   Slight 21-25  DNT Did Not Test   Mild 26-40  ECV Ear Canal Volume   Moderate 41-55  OAE Otoacoustic Emissions   Moderately-Severe 56-70  SIN Speech in Noise   Severe 71-90  TM Tympanic Membrane   Profound 91+  HA Hearing Aid   Sensorineural Hearing Loss SNHL  MLV Monitored Live Voice   Conductive Hearing Loss CHL  WNL Within Normal Limits    Noise-Induced Hearing Loss NIHL

## 2025-02-21 NOTE — TELEPHONE ENCOUNTER
RN left voicemail for patient to get scheduled with an Otology provider as referred by Miguel Kang. Callback number provided.

## 2025-02-22 LAB
ALBUMIN SERPL-MCNC: 3.9 G/DL (ref 3.6–5.1)
ALP SERPL-CCNC: 79 U/L (ref 37–153)
ALT SERPL-CCNC: 39 U/L (ref 6–29)
ANION GAP SERPL CALCULATED.4IONS-SCNC: 8 MMOL/L (CALC) (ref 7–17)
AST SERPL-CCNC: 15 U/L (ref 10–35)
BASOPHILS # BLD AUTO: 59 CELLS/UL (ref 0–200)
BASOPHILS NFR BLD AUTO: 0.9 %
BILIRUB SERPL-MCNC: 0.4 MG/DL (ref 0.2–1.2)
BUN SERPL-MCNC: 13 MG/DL (ref 7–25)
CALCIUM SERPL-MCNC: 9.8 MG/DL (ref 8.6–10.4)
CHLORIDE SERPL-SCNC: 105 MMOL/L (ref 98–110)
CO2 SERPL-SCNC: 29 MMOL/L (ref 20–32)
CREAT SERPL-MCNC: 0.59 MG/DL (ref 0.5–1.05)
EGFRCR SERPLBLD CKD-EPI 2021: 99 ML/MIN/1.73M2
EOSINOPHIL # BLD AUTO: 119 CELLS/UL (ref 15–500)
EOSINOPHIL NFR BLD AUTO: 1.8 %
ERYTHROCYTE [DISTWIDTH] IN BLOOD BY AUTOMATED COUNT: 11.9 % (ref 11–15)
GLUCOSE SERPL-MCNC: 105 MG/DL (ref 65–99)
HCT VFR BLD AUTO: 37.4 % (ref 35–45)
HGB BLD-MCNC: 11.9 G/DL (ref 11.7–15.5)
LYMPHOCYTES # BLD AUTO: 1208 CELLS/UL (ref 850–3900)
LYMPHOCYTES NFR BLD AUTO: 18.3 %
MCH RBC QN AUTO: 30.8 PG (ref 27–33)
MCHC RBC AUTO-ENTMCNC: 31.8 G/DL (ref 32–36)
MCV RBC AUTO: 96.9 FL (ref 80–100)
MONOCYTES # BLD AUTO: 640 CELLS/UL (ref 200–950)
MONOCYTES NFR BLD AUTO: 9.7 %
NEUTROPHILS # BLD AUTO: 4574 CELLS/UL (ref 1500–7800)
NEUTROPHILS NFR BLD AUTO: 69.3 %
PLATELET # BLD AUTO: 418 THOUSAND/UL (ref 140–400)
PMV BLD REES-ECKER: 10.3 FL (ref 7.5–12.5)
POTASSIUM SERPL-SCNC: 4.5 MMOL/L (ref 3.5–5.3)
PROT SERPL-MCNC: 7 G/DL (ref 6.1–8.1)
RBC # BLD AUTO: 3.86 MILLION/UL (ref 3.8–5.1)
SODIUM SERPL-SCNC: 142 MMOL/L (ref 135–146)
WBC # BLD AUTO: 6.6 THOUSAND/UL (ref 3.8–10.8)

## 2025-02-24 ENCOUNTER — APPOINTMENT (OUTPATIENT)
Dept: AUDIOLOGY | Facility: CLINIC | Age: 65
End: 2025-02-24
Payer: MEDICARE

## 2025-02-24 ENCOUNTER — APPOINTMENT (OUTPATIENT)
Dept: OTOLARYNGOLOGY | Facility: CLINIC | Age: 65
End: 2025-02-24
Payer: MEDICARE

## 2025-02-24 VITALS — HEIGHT: 65 IN | BODY MASS INDEX: 24.99 KG/M2 | WEIGHT: 150 LBS

## 2025-02-24 DIAGNOSIS — H65.23 BILATERAL CHRONIC SEROUS OTITIS MEDIA: ICD-10-CM

## 2025-02-24 DIAGNOSIS — H69.93 DYSFUNCTION OF BOTH EUSTACHIAN TUBES: Primary | ICD-10-CM

## 2025-02-24 DIAGNOSIS — H90.6 MIXED CONDUCTIVE AND SENSORINEURAL HEARING LOSS OF BOTH EARS: ICD-10-CM

## 2025-02-24 PROCEDURE — 1111F DSCHRG MED/CURRENT MED MERGE: CPT | Performed by: OTOLARYNGOLOGY

## 2025-02-24 PROCEDURE — 1123F ACP DISCUSS/DSCN MKR DOCD: CPT | Performed by: OTOLARYNGOLOGY

## 2025-02-24 PROCEDURE — 99204 OFFICE O/P NEW MOD 45 MIN: CPT | Performed by: OTOLARYNGOLOGY

## 2025-02-24 PROCEDURE — 1159F MED LIST DOCD IN RCRD: CPT | Performed by: OTOLARYNGOLOGY

## 2025-02-24 PROCEDURE — 1160F RVW MEDS BY RX/DR IN RCRD: CPT | Performed by: OTOLARYNGOLOGY

## 2025-02-24 PROCEDURE — 3008F BODY MASS INDEX DOCD: CPT | Performed by: OTOLARYNGOLOGY

## 2025-02-24 RX ORDER — FLUTICASONE PROPIONATE 50 MCG
2 SPRAY, SUSPENSION (ML) NASAL DAILY
Qty: 16 G | Refills: 11 | Status: SHIPPED | OUTPATIENT
Start: 2025-02-24 | End: 2026-02-24

## 2025-02-24 RX ORDER — AZELASTINE 1 MG/ML
2 SPRAY, METERED NASAL 2 TIMES DAILY
Qty: 30 ML | Refills: 11 | Status: SHIPPED | OUTPATIENT
Start: 2025-02-24 | End: 2026-02-24

## 2025-02-24 NOTE — PROGRESS NOTES
.        Reason for Consult:  New Patient Visit and Hearing Loss (After hospital stay for pneumonia feels clogged left ear worse than right. February 8-13th)     Subjective   History Of Present Illness:  Reta Davis is a 64 y.o. female with bilateral hearing loss. She was recently hospitalized 2/8/25 for 6 days with influenza. Received azithromycin, ceftriaxone for presumed pneumonia. She reports hearing loss which began 1 week prior to her hospitalizatoin at the initial onset of nasal congestion, cough, and influenza diagnosis. She has history of mild to moderate SNHL which is consistent from her prior audiogram in 2018. She was seen by PCP who recommended ENT follow up for new worsened hearing, aural fullness. Audiogram 2/21/25 shows mild to profound CHL and type B tymps bilaterally.      Past Medical History:  She has a past medical history of Anxiety, Asthma, Carcinoma of appendix (Multi), Depression, Depression, recurrent (CMS-HCC) (04/14/2023), Essential (primary) hypertension, Kidney stone (05/25/2023), Pneumonia due to infectious organism, unspecified laterality, unspecified part of lung (02/08/2025), and Thyroid nodule (02/27/2023).    Surgical History:  She has a past surgical history that includes Sinus surgery; Appendectomy (2017); Breast biopsy (Left, 2018); US guided thyroid biopsy (11/14/2016); Total abdominal hysterectomy w/ bilateral salpingoophorectomy (07/03/2018); Cystoscopy insertion / removal stent / stone (2021); Colonoscopy; and Breast biopsy (Left, 1990).     Social History:  She reports that she has never smoked. She has never been exposed to tobacco smoke. She has never used smokeless tobacco. She reports that she does not currently use alcohol. She reports that she does not currently use drugs.    Family History:  family history includes Breast cancer (age of onset: 63) in her mother; Heart attack in her father and mother; Heart failure in her mother; Hypertension in her father, mother,  "and sister; Stroke in her father and mother; malignant neoplasm of breast in her mother.     Medications:  Current Outpatient Medications   Medication Instructions    acetaminophen (TYLENOL) 500 mg, Every 4 hours PRN    albuterol 90 mcg/actuation inhaler 1 puff, inhalation, Every 4 hours PRN    azelastine (Astelin) 137 mcg (0.1 %) nasal spray 2 sprays, Each Nostril, 2 times daily, Use in each nostril as directed    cholecalciferol (VITAMIN D-3) 125 mcg, Daily    fluticasone (Flonase) 50 mcg/actuation nasal spray 2 sprays, Each Nostril, Daily, Shake gently. Before first use, prime pump. After use, clean tip and replace cap.    lisinopril 20 mg, oral, Daily    multivitamin tablet 1 tablet, Daily    nystatin (MYCOSTATIN) 500,000 Units, oral, 2 times daily, Swish in mouth and spit out.      Allergies:  Antihistamine [diphenhydramine hcl], Levofloxacin, Prednisolone, and Tetracyclines    Review of Systems:   A comprehensive 10-point review of systems was obtained including constitutional, neurological, HEENT, pulmonary, cardiovascular, genito-urinary, and other pertinent systems and was negative except as noted in the HPI.     Objective   Physical Exam:  Last Recorded Vitals: Height 1.651 m (5' 5\"), weight 68 kg (150 lb), last menstrual period 01/01/2008.    On physical exam, the patient is a well-nourished, well-developed patient, in no acute distress, able to communicate without assistance in English language. Head and face is atraumatic and normocephalic. Salivary glands are intact. Facial strength is symmetrical bilaterally.       On ear examination:  Right ear: The patient has an open and patent ear canal. The tympanic membrane has an effusion.  AC>BC  Left ear: The patient has an open and patent ear canal. The tympanic membrane has an effusion wit air fluid level.   AC>BC  The Braden is midline    On vestibular exam, the patient has no spontaneous nystagmus, no headshake nystagmus, no head-thrust nystagmus, and no " nystagmus on hyperventilation or Valsalva maneuvers. Chely-Hallpike maneuver is negative bilaterally.       On neuro exam, the patient is alert and oriented x3, cranial nerves are grossly intact, cerebellar exam is normal.      The rest of the exam, including anterior rhinoscopy, oropharyngeal exam, neck exam, and cardiovascular exam, were normal including no palpable lymphadenopathies, thyroid in the midline position, normal pulses, and normal chest excursion.       Reviewed Results:  Audiology Testing:   I personally reviewed the audiogram from 2/24/25 that showed:   Right ear: mixed moderate to severe mixed hearing loss . Discrimination: 56%   Left ear: mixed moderate to severe mixed hearing loss . Discrimination: 68%           Assessment/Plan     1. Dysfunction of both eustachian tubes    2. Bilateral chronic serous otitis media    3. Mixed conductive and sensorineural hearing loss of both ears        In summary, Reta Davis is a 64 y.o. female with bilateral mixed hearing loss with significant new conductive component compared to 2018 audiogram in the setting of recent influenza infection. She has bilateral mucoid effusions present with air fluid level. Discussed conservative options including nasal sprays versus ear tubes today. She would like to trial sprays and follow up.     - Azelastine and flonase and follow up in 4-6 weeks         ____________________________________________________  Jose Alberto Glover MD  Professor and Chief   Otology/Neurotology/Lateral Skull-Base Surgery   [facility]

## 2025-02-24 NOTE — LETTER
February 28, 2025     Kenia Bingham MD  [address] [place] [address]   [address] [address]    Patient: Reta Davis   YOB: 1960   Date of Visit: 2/24/2025       Dear Dr. Kenia Bingham MD:    Thank you for referring Reta Davis to me for evaluation. Below are my notes for this consultation.  If you have questions, please do not hesitate to call me. I look forward to following your patient along with you.       Sincerely,     Jose Alberto Geronimo MD      CC: Sara Rodriguez Pla, DO  ______________________________________________________________________________________    .        Reason for Consult:  New Patient Visit and Hearing Loss (After hospital stay for pneumonia feels clogged left ear worse than right. February 8-13th)     Subjective  History Of Present Illness:  Reta Davis is a 64 y.o. female with bilateral hearing loss. She was recently hospitalized 2/8/25 for 6 days with influenza. Received azithromycin, ceftriaxone for presumed pneumonia. She reports hearing loss which began 1 week prior to her hospitalizatoin at the initial onset of nasal congestion, cough, and influenza diagnosis. She has history of mild to moderate SNHL which is consistent from her prior audiogram in 2018. She was seen by PCP who recommended ENT follow up for new worsened hearing, aural fullness. Audiogram 2/21/25 shows mild to profound CHL and type B tymps bilaterally.      Past Medical History:  She has a past medical history of Anxiety, Asthma, Carcinoma of appendix (Multi), Depression, Depression, recurrent (CMS-HCC) (04/14/2023), Essential (primary) hypertension, Kidney stone (05/25/2023), Pneumonia due to infectious organism, unspecified laterality, unspecified part of lung (02/08/2025), and Thyroid nodule (02/27/2023).    Surgical History:  She has a past surgical history that includes Sinus surgery; Appendectomy (2017); Breast biopsy (Left, 2018); US guided thyroid biopsy (11/14/2016); Total abdominal  "hysterectomy w/ bilateral salpingoophorectomy (07/03/2018); Cystoscopy insertion / removal stent / stone (2021); Colonoscopy; and Breast biopsy (Left, 1990).     Social History:  She reports that she has never smoked. She has never been exposed to tobacco smoke. She has never used smokeless tobacco. She reports that she does not currently use alcohol. She reports that she does not currently use drugs.    Family History:  family history includes Breast cancer (age of onset: 63) in her mother; Heart attack in her father and mother; Heart failure in her mother; Hypertension in her father, mother, and sister; Stroke in her father and mother; malignant neoplasm of breast in her mother.     Medications:  Current Outpatient Medications   Medication Instructions   • acetaminophen (TYLENOL) 500 mg, Every 4 hours PRN   • albuterol 90 mcg/actuation inhaler 1 puff, inhalation, Every 4 hours PRN   • azelastine (Astelin) 137 mcg (0.1 %) nasal spray 2 sprays, Each Nostril, 2 times daily, Use in each nostril as directed   • cholecalciferol (VITAMIN D-3) 125 mcg, Daily   • fluticasone (Flonase) 50 mcg/actuation nasal spray 2 sprays, Each Nostril, Daily, Shake gently. Before first use, prime pump. After use, clean tip and replace cap.   • lisinopril 20 mg, oral, Daily   • multivitamin tablet 1 tablet, Daily   • nystatin (MYCOSTATIN) 500,000 Units, oral, 2 times daily, Swish in mouth and spit out.      Allergies:  Antihistamine [diphenhydramine hcl], Levofloxacin, Prednisolone, and Tetracyclines    Review of Systems:   A comprehensive 10-point review of systems was obtained including constitutional, neurological, HEENT, pulmonary, cardiovascular, genito-urinary, and other pertinent systems and was negative except as noted in the HPI.     Objective  Physical Exam:  Last Recorded Vitals: Height 1.651 m (5' 5\"), weight 68 kg (150 lb), last menstrual period 01/01/2008.    On physical exam, the patient is a well-nourished, well-developed " patient, in no acute distress, able to communicate without assistance in English language. Head and face is atraumatic and normocephalic. Salivary glands are intact. Facial strength is symmetrical bilaterally.       On ear examination:  Right ear: The patient has an open and patent ear canal. The tympanic membrane has an effusion.  AC>BC  Left ear: The patient has an open and patent ear canal. The tympanic membrane has an effusion wit air fluid level.   AC>BC  The Braden is midline    On vestibular exam, the patient has no spontaneous nystagmus, no headshake nystagmus, no head-thrust nystagmus, and no nystagmus on hyperventilation or Valsalva maneuvers. Chely-Hallpike maneuver is negative bilaterally.       On neuro exam, the patient is alert and oriented x3, cranial nerves are grossly intact, cerebellar exam is normal.      The rest of the exam, including anterior rhinoscopy, oropharyngeal exam, neck exam, and cardiovascular exam, were normal including no palpable lymphadenopathies, thyroid in the midline position, normal pulses, and normal chest excursion.       Reviewed Results:  Audiology Testing:   I personally reviewed the audiogram from 2/24/25 that showed:   Right ear: mixed moderate to severe mixed hearing loss . Discrimination: 56%   Left ear: mixed moderate to severe mixed hearing loss . Discrimination: 68%           Assessment/Plan    1. Dysfunction of both eustachian tubes    2. Bilateral chronic serous otitis media    3. Mixed conductive and sensorineural hearing loss of both ears        In summary, Reta Davis is a 64 y.o. female with bilateral mixed hearing loss with significant new conductive component compared to 2018 audiogram in the setting of recent influenza infection. She has bilateral mucoid effusions present with air fluid level. Discussed conservative options including nasal sprays versus ear tubes today. She would like to trial sprays and follow up.     - Azelastine and flonase and follow  up in 4-6 weeks         ____________________________________________________  Jose Alberto Glover MD  Professor and Chief   Otology/Neurotology/Lateral Skull-Base Surgery   [facility]

## 2025-03-01 NOTE — ED PROCEDURE NOTE
Procedure  Critical Care    Performed by: Shyann Mack MD  Authorized by: Shyann Mack MD    Critical care provider statement:     Critical care time (minutes):  30    Critical care was necessary to treat or prevent imminent or life-threatening deterioration of the following conditions: hypoxic pna.    Critical care was time spent personally by me on the following activities:  Blood draw for specimens, evaluation of patient's response to treatment, examination of patient, ordering and performing treatments and interventions, ordering and review of laboratory studies, ordering and review of radiographic studies, pulse oximetry, re-evaluation of patient's condition and review of old charts               Shyann Mack MD  03/01/25 0672

## 2025-04-04 ENCOUNTER — HOSPITAL ENCOUNTER (OUTPATIENT)
Dept: RADIOLOGY | Facility: HOSPITAL | Age: 65
Discharge: HOME | End: 2025-04-04
Payer: MEDICARE

## 2025-04-04 ENCOUNTER — APPOINTMENT (OUTPATIENT)
Dept: PRIMARY CARE | Facility: CLINIC | Age: 65
End: 2025-04-04
Payer: MEDICARE

## 2025-04-04 VITALS
WEIGHT: 155.8 LBS | HEART RATE: 94 BPM | HEIGHT: 65 IN | OXYGEN SATURATION: 94 % | DIASTOLIC BLOOD PRESSURE: 86 MMHG | SYSTOLIC BLOOD PRESSURE: 150 MMHG | BODY MASS INDEX: 25.96 KG/M2 | TEMPERATURE: 97.9 F

## 2025-04-04 DIAGNOSIS — M25.531 RIGHT WRIST PAIN: ICD-10-CM

## 2025-04-04 DIAGNOSIS — R79.89 ELEVATED LFTS: ICD-10-CM

## 2025-04-04 DIAGNOSIS — J18.9 PNEUMONIA DUE TO INFECTIOUS ORGANISM, UNSPECIFIED LATERALITY, UNSPECIFIED PART OF LUNG: ICD-10-CM

## 2025-04-04 DIAGNOSIS — J45.20 MILD INTERMITTENT EXTRINSIC ASTHMA WITHOUT COMPLICATION (HHS-HCC): ICD-10-CM

## 2025-04-04 DIAGNOSIS — I10 BENIGN ESSENTIAL HYPERTENSION: Primary | ICD-10-CM

## 2025-04-04 PROCEDURE — 73130 X-RAY EXAM OF HAND: CPT | Mod: RT

## 2025-04-04 RX ORDER — LISINOPRIL 20 MG/1
20 TABLET ORAL DAILY
Qty: 90 TABLET | Refills: 1 | Status: SHIPPED | OUTPATIENT
Start: 2025-04-04

## 2025-04-04 ASSESSMENT — ENCOUNTER SYMPTOMS
OCCASIONAL FEELINGS OF UNSTEADINESS: 0
DEPRESSION: 0
LOSS OF SENSATION IN FEET: 0

## 2025-04-04 NOTE — PATIENT INSTRUCTIONS
Please make a follow up appointment in   1-3 months  for your: Follow up appointment for: blood pressure check.    Please complete your XRAY for your wrist.    If you had lab work ordered today, please complete it at your earliest convenience. If its lab work for your yearly physical please get this fasting and complete 1-2 weeks before your appointment. You do not need to bring in any paperwork to get this blood work done. You can walk-in or make an appointment at [facility] location. Your list of tests on your summary are for your personal records. You can request a copay estimate from the  on the labs you are ordered.    If you had referrals placed today, you can call the scheduling phone number on your after visit summary to schedule which is: [phone]. Referrals include your preventative health screening tests such as colonoscopy, mammograms, or bone density scans.

## 2025-04-04 NOTE — PROGRESS NOTES
"  [facility] - [facility]  [address], [address]  [place] [address]    Subjective     Patient ID: Reta Davis is a 65 y.o. female who presents for  Follow-up (pneumonia)       Her hearing is improving. She is seeing ENT. And doing nasal sprays. She is going to follow up with them. Still having some pressure sensation.     Breathing has been good. Her cough is gone. Has not needed any prn inhaler.     Her blood pressure was a little high this morning.     Wrist pain since dec 2024. Had a box falling on her wrist and her wrist snapped back. Last couple weeks has been worse. On the base of the thumb and wrist area around the thumb, having issues with bending or twisting. Having issues with doing things like brushing hair or doing activities through. Is intermittent. Feels like a burning pain. Also feels swollen. Will be stiff in the AM. Tried ice and heat.     Review of Systems   All other systems reviewed and are negative.      Social History     Tobacco Use    Smoking status: Never     Passive exposure: Never    Smokeless tobacco: Never   Vaping Use    Vaping status: Never Used   Substance Use Topics    Alcohol use: Not Currently    Drug use: Not Currently     Family History   Problem Relation Name Age of Onset    Breast cancer Mother  63    Heart failure Mother      Hypertension Mother      Stroke Mother      Heart attack Mother      Other (malignant neoplasm of breast) Mother      Heart attack Father      Stroke Father      Hypertension Father      Hypertension Sister       Allergies   Allergen Reactions    Antihistamine [Diphenhydramine Hcl] Unknown     Pt states \"I have allergy induced asthma and the antihistamines make my saliva really thick\"    Levofloxacin Other and Swelling    Prednisolone Other     thrush    Tetracyclines Rash and Unknown        Current Outpatient Medications   Medication Sig Dispense Refill    acetaminophen (Tylenol) 160 mg/5 mL (5 mL) suspension Take 15 mL (480 mg) " "by mouth every 4 hours if needed for mild pain (1 - 3) or fever (temp greater than 38.0 C).      azelastine (Astelin) 137 mcg (0.1 %) nasal spray Administer 2 sprays into each nostril 2 times a day. Use in each nostril as directed 30 mL 11    cholecalciferol (Vitamin D-3) 125 MCG (5000 UT) capsule Take 1 capsule (125 mcg) by mouth once daily.      fluticasone (Flonase) 50 mcg/actuation nasal spray Administer 2 sprays into each nostril once daily. Shake gently. Before first use, prime pump. After use, clean tip and replace cap. 16 g 11    multivitamin tablet Take 1 tablet by mouth once daily.      nystatin (Mycostatin) 100,000 unit/mL suspension Take 5 mL (500,000 Units) by mouth 2 times a day. Swish in mouth and spit out. 60 mL 0    albuterol 90 mcg/actuation inhaler Inhale 1 puff every 4 hours if needed for wheezing. 18 g 3    lisinopril 20 mg tablet Take 1 tablet (20 mg) by mouth once daily. 90 tablet 1     No current facility-administered medications for this visit.       /86 (BP Location: Left arm, Patient Position: Sitting, BP Cuff Size: Adult)   Pulse 94   Temp 36.6 °C (97.9 °F) (Oral)   Ht 1.651 m (5' 5\")   Wt 70.7 kg (155 lb 12.8 oz)   LMP 01/01/2008 (Approximate)   SpO2 94%   BMI 25.93 kg/m²      Objective   Physical Exam  Vitals reviewed.   Constitutional:       Appearance: Normal appearance.   Eyes:      Extraocular Movements: Extraocular movements intact.      Pupils: Pupils are equal, round, and reactive to light.   Cardiovascular:      Rate and Rhythm: Normal rate and regular rhythm.      Pulses: Normal pulses.      Heart sounds: Normal heart sounds.   Pulmonary:      Effort: Pulmonary effort is normal.      Breath sounds: Normal breath sounds.   Musculoskeletal:         General: Normal range of motion.      Cervical back: Normal range of motion and neck supple.   Neurological:      General: No focal deficit present.      Mental Status: She is alert.   Psychiatric:         Mood and Affect: " Mood normal.         Behavior: Behavior normal.     Musculoskeletal/Neuro:  Inspection: exam is for the right hand, contralateral hand is normal unless otherwise specified.  Bruising:No  Skin:Within normal limits for age and diagnosis  Symmetry: swelling at the lateral wrist worse on the right side  Atrophy: negative on the whole hand  Palpation:  Tenderness:Yes on the  lateral 1st digit.  Pulse: +2  Cap refill: <3  Sensation:  Intact grossly to fine touch  on the right  Strength:  normal throughout upper extremities and muscle bulk was normal on the right  Range of motion:  Finger: flexion and extension intact on the MCP, PIP, DIP  Wrist: Flexion, Extension, radioulnar deviation painful. Radioulnar deviation limited on the right.  Special Tests:  Tinel's Test:Negative on the right  Phalen's: Negative on the right  Finkelstein's: Diffuse pain, inconclusive on the right  TFCC grind: Negative on the right      Labs:   Lab Results   Component Value Date    WBC 6.6 02/21/2025    HGB 11.9 02/21/2025    HCT 37.4 02/21/2025     (H) 02/21/2025    TSH 1.97 02/18/2023    INR 1.0 03/26/2021     Lab Results   Component Value Date     02/21/2025    K 4.5 02/21/2025     02/21/2025    BUN 13 02/21/2025    CREATININE 0.59 02/21/2025    GLUCOSE 105 (H) 02/21/2025    CALCIUM 9.8 02/21/2025    PROT 7.0 02/21/2025    BILITOT 0.4 02/21/2025    ALKPHOS 79 02/21/2025    AST 15 02/21/2025    ALT 39 (H) 02/21/2025     Lab Results   Component Value Date    CHOL 233 (H) 09/30/2024    CHOL 212 (H) 04/18/2024    CHOL 244 (H) 11/07/2023      Lab Results   Component Value Date    TRIG 107 09/30/2024    TRIG 115 04/18/2024    TRIG 87 11/07/2023      Lab Results   Component Value Date    HDL 61.4 09/30/2024    HDL 60.6 04/18/2024    HDL 77.8 11/07/2023     Lab Results   Component Value Date    LDLCALC 150 (H) 09/30/2024    LDLCALC 128 (H) 04/18/2024    LDLCALC 149 (H) 11/07/2023      Lab Results   Component Value Date    VLDL 21  "09/30/2024    VLDL 23 04/18/2024    VLDL 17 11/07/2023    No components found for: \"CHOLHDLRATI0\"    No data recorded    Imaging/Testing: ECG 12 lead  Normal sinus rhythm  Possible Left atrial enlargement  Borderline ECG  When compared with ECG of 26-MAR-2021 12:29,  No significant change was found  Confirmed by Silvino De La Rosa ([id number]) on 2/21/2025 3:27:19 PM    Assessment/Plan   Problem List Items Addressed This Visit       Extrinsic asthma without complication (HHS-HCC)     Chronic issue, stable, subsequent encounter. Continue current mgmt.         Benign essential hypertension - Primary    Relevant Medications    lisinopril 20 mg tablet     Other Visit Diagnoses       Pneumonia due to infectious organism, unspecified laterality, unspecified part of lung        Elevated LFTs        Relevant Orders    Comprehensive metabolic panel    Right wrist pain        Relevant Orders    XR hand right 1-2 views    Referral to Occupational Therapy          PNA - resolved.    LFT - improving, recheck since still abnl.    Wrist pain - acute issue, uncontrolled, initial encounter. Likely 2/2 trauma, will get XRAY. Referral pt. Advise wrist bracing. Can use OTC pain topicals and tylenol.    orders and follow up as documented in EMR, lab results reviewed with patient, repeat labs ordered prior to next appointment, reviewed compliance with lifestyle measures, reviewed diet, exercise and weight control, reviewed medications and side effects in detail     Return visit in 1-3 month. Blood pressure check           SHERRY DC MD, [facility]  Department of Family Medicine of [facility] - Primary Care  "

## 2025-04-07 ENCOUNTER — EVALUATION (OUTPATIENT)
Dept: OCCUPATIONAL THERAPY | Facility: CLINIC | Age: 65
End: 2025-04-07
Payer: MEDICARE

## 2025-04-07 DIAGNOSIS — M25.531 RIGHT WRIST PAIN: ICD-10-CM

## 2025-04-07 PROCEDURE — 97110 THERAPEUTIC EXERCISES: CPT | Mod: GO

## 2025-04-07 PROCEDURE — 97165 OT EVAL LOW COMPLEX 30 MIN: CPT | Mod: GO

## 2025-04-07 ASSESSMENT — ENCOUNTER SYMPTOMS
OCCASIONAL FEELINGS OF UNSTEADINESS: 0
DEPRESSION: 0
LOSS OF SENSATION IN FEET: 0

## 2025-04-07 NOTE — PROGRESS NOTES
Occupational Therapy Orthopedic Evaluation    Patient Name: Reta Davis  MRN: [id number]  Today's Date: 4/7/2025  Time Calculation  Start Time: 1245  Stop Time: 1330  Time Calculation (min): 45 min    Insurance:  Visit number: 1   Insurance Type: Payor: MEDICARE / Plan: MEDICARE PART A AND B / Product Type: *No Product type* /   Authorization or Plan of Care date Range: 4/7/2025 to 6/7/2025    General:  Reason for visit: Right wrist pain   Referred by: Dr. Kenia Bingham MD     Current Problem  1. Right wrist pain  Referral to Occupational Therapy        Precautions:    Medical History Form: Reviewed (scanned into chart) refer to [facility]   Diagnoses pertinent to therapy: [place] fluid in ears, H/A appendix CA, HTN     SUBJECTIVE:   Patient is a  65y.o. female who presents to OT clinic with the DX of right wrist pain.   NO: Patient was lifting box/tote from shelf, box began to fall and bent her hand,wrist back.   Date of onset: December 2024   Date of surgery: NA   Chief complaints/concerns from patient/family member: increased pain and difficulty with brushing hair,teeth, turning door knob, lifting objects         Intense burning pain with wrist rotation.     Hand Dominance: Right     Pain:  Location: Right radial wrist, CMC jt   At rest :     0   /10   Occasional mild twinges          Fxal use/movement:     7-8  /10  Description/Type: Burning, twinges, Intense   Aggravating Factors: wrist rotation   Relieving Factors: Rest. Wrist cock up prefab splint purchased.     Relevant Information (PMH & Previous Tests/Imaging): Xray   Previous Interventions/Treatments: prefab wrist splint     Prior Level of Function (PLOF)  Previous ADL/IADL Status:  Independent   Work/School: Retired   Leisure/Hobbies: None stated     Patients Living Environment: Reviewed and no concern  Primary Language: English    Pt goals for therapy: Increase functionality of arm with decreased pain.     OBJECTIVE:     ROM:  ELBOW/FOREARM: AROM (Degrees of  motion)   * WFL unless documented below                 Right             Left   Flexion  WNL   Extension   WNL   Pronation   WNL   Supination  WNL      WRIST AROM  (Degrees of motion)  * WFL unless documented below                Right             Left   Flexion   With pain reports *   Extension   *   Radial Deviation  *   Ulnar Deviation  *      HAND/DIGIT AROM:  WFL/WNL     Thumb AROM: MP 0/37 degrees  IP 0/62 * pain reports     Abduction WFLs     Hand Strength: LBS * denotes pain reports                 Right                Left    Dynamometer  52* 50   Lateral Pinch 8.5 10   3 PT Pinch  Tip Pinch 7.5  5* 10  5      Positive : Finkelstein    Physical Observation:   Edema:     None observed   Paresthesias: grossly intact   Scar/Incision: NA  Coordination: Nine [place] peg test: R: NE     Quick Dash Outcome Measurement:    34.9 %    Red Flags: Do you have any of the following? No  Fever/chills, unexplained weight changes, dizziness/fainting, unexplained change in bowel or bladder functions, unexplained malaise or muscle weakness, night pain/sweats, numbness or tingling    Treatment:   OT evaluation completed and HEP issued.     Therapeutic Exercise:  Patient education on rationale, benefits and timing of MH, warm soaks and CP use to decrease pain and symptoms.  CMC stabilization exs: Imaginary ball, EPB ROM exercises  Patient fitted and issued a medium comfort cool prefab splint for wear options night as tolerated, during the day during heavier fxal activities. Donning/doffing, care and precautions instructed to pt. Patient reports good, comfortable fit with splint and verbalizes good understanding of wear time, care and precautions.     Patient verbalizes and demonstrates good understanding,technique and precautions with above.  Written and illustrated handouts issued to patient.     OT Evaluation Time Entry  OT Evaluation (Low) Time Entry: 30  OT Therapeutic Procedures Time Entry  Therapeutic Exercise Time  Entry: 15    ASSESSMENT:   Patient is a 65 y.o. female with the diagnosis of  resulting in limited ability and participation in ADLs and IADLS activities. Pt demonstrating increase pain with UE ROM and fxal use. Pt would benefit from Occupational Therapy to address the impairments documented in the objective section in order to return to safe and pain-free daily fxal activities with increased independence.     PLAN:  Goals:  Active       OT Goals       Patient to be independent with HEP and pain reduction techs to further fxal progress and ability with decreased pain levels to 2-3/10 or less.         Start:  04/07/25    Expected End:  06/06/25            Patient to be independent with conservative management principles of diagnosis, including splinting, home modalities and joint protection/UE lifting techniques in order to decrease overall pain and symptoms of UE for improved fxal use.         Start:  04/07/25    Expected End:  06/06/25            Patient to increase lateral pinch to 10# or better to open containers, jars.       Start:  04/07/25    Expected End:  06/06/25            Patient to increase 3 pt pinch strength to 9# or better open/close, manipulate household objects.        Start:  04/07/25    Expected End:  06/06/25               OT Problem       PATIENT STATED GOAL: Patient to use right arm/hand for feeding, grooming and ADLS with increase ease and ability by 50%.        Start:  04/07/25    Expected End:  06/06/25              Planned Interventions include: therapeutic exercise, therapeutic activity, self-care home management, manual therapy, neuromuscular education , electric stimulation, fluidotherapy, ultrasound, Home exercise program, orthosis fabrication, wound care/scar management.     Frequency: 1 x week  Duration: 4-6 weeks    Rehab Potential: Good   Plan of care was developed with input and agreement by the patient.     Complexity of evaluation: Anton Menezes MS, OTR/L [id number]

## 2025-04-14 ENCOUNTER — APPOINTMENT (OUTPATIENT)
Dept: OTOLARYNGOLOGY | Facility: CLINIC | Age: 65
End: 2025-04-14
Payer: MEDICARE

## 2025-04-14 VITALS — HEIGHT: 65 IN | WEIGHT: 155 LBS | BODY MASS INDEX: 25.83 KG/M2

## 2025-04-14 DIAGNOSIS — H61.23 BILATERAL IMPACTED CERUMEN: ICD-10-CM

## 2025-04-14 DIAGNOSIS — H69.93 DYSFUNCTION OF BOTH EUSTACHIAN TUBES: Primary | ICD-10-CM

## 2025-04-14 DIAGNOSIS — H90.3 SENSORINEURAL HEARING LOSS (SNHL) OF BOTH EARS: ICD-10-CM

## 2025-04-14 PROCEDURE — 1160F RVW MEDS BY RX/DR IN RCRD: CPT | Performed by: OTOLARYNGOLOGY

## 2025-04-14 PROCEDURE — 1123F ACP DISCUSS/DSCN MKR DOCD: CPT | Performed by: OTOLARYNGOLOGY

## 2025-04-14 PROCEDURE — 1159F MED LIST DOCD IN RCRD: CPT | Performed by: OTOLARYNGOLOGY

## 2025-04-14 PROCEDURE — 3008F BODY MASS INDEX DOCD: CPT | Performed by: OTOLARYNGOLOGY

## 2025-04-14 PROCEDURE — 69210 REMOVE IMPACTED EAR WAX UNI: CPT | Performed by: OTOLARYNGOLOGY

## 2025-04-14 PROCEDURE — 99213 OFFICE O/P EST LOW 20 MIN: CPT | Performed by: OTOLARYNGOLOGY

## 2025-04-14 ASSESSMENT — PATIENT HEALTH QUESTIONNAIRE - PHQ9
2. FEELING DOWN, DEPRESSED OR HOPELESS: NOT AT ALL
1. LITTLE INTEREST OR PLEASURE IN DOING THINGS: NOT AT ALL
SUM OF ALL RESPONSES TO PHQ9 QUESTIONS 1 AND 2: 0

## 2025-04-14 NOTE — LETTER
April 27, 2025     Kenia Bingham MD  [address] [place] [address]   [address] [address]    Patient: Reta Davis   YOB: 1960   Date of Visit: 4/14/2025       Dear Dr. Kenia Bingham MD:    Thank you for referring Reta Davis to me for evaluation. Below are my notes for this consultation.  If you have questions, please do not hesitate to call me. I look forward to following your patient along with you.       Sincerely,     Jose Alberto Geronimo MD      CC: No Recipients  ______________________________________________________________________________________            Reason for Consult:  Follow-up     Subjective  History Of Present Illness:  Reta Davis is a 65 y.o. female with bilateral mixed hearing loss with significant new conductive component compared to 2018 audiogram in the setting of recent influenza infection. She has bilateral mucoid effusions present with air fluid level. She elected to proceed with conservative options including nasal sprays and azelastine. Since then, her hearing has improved. She feels mild pressure sensation.     Past Medical History:  She has a past medical history of Anxiety, Asthma, Carcinoma of appendix, Depression, Depression, recurrent (CMS-HCC) (04/14/2023), Essential (primary) hypertension, Kidney stone (05/25/2023), Pneumonia due to infectious organism, unspecified laterality, unspecified part of lung (02/08/2025), and Thyroid nodule (02/27/2023).    Surgical History:  She has a past surgical history that includes Sinus surgery; Appendectomy (2017); Breast biopsy (Left, 2018); US guided thyroid biopsy (11/14/2016); Total abdominal hysterectomy w/ bilateral salpingoophorectomy (07/03/2018); Cystoscopy insertion / removal stent / stone (2021); Colonoscopy; and Breast biopsy (Left, 1990).     Social History:  She reports that she has never smoked. She has never been exposed to tobacco smoke. She has never used smokeless tobacco. She reports that she does not  "currently use alcohol. She reports that she does not currently use drugs.    Family History:  family history includes Breast cancer (age of onset: 63) in her mother; Heart attack in her father and mother; Heart failure in her mother; Hypertension in her father, mother, and sister; Stroke in her father and mother; malignant neoplasm of breast in her mother.     Medications:  Current Outpatient Medications   Medication Instructions   • acetaminophen (TYLENOL) 500 mg, Every 4 hours PRN   • albuterol 90 mcg/actuation inhaler 1 puff, inhalation, Every 4 hours PRN   • azelastine (Astelin) 137 mcg (0.1 %) nasal spray 2 sprays, Each Nostril, 2 times daily, Use in each nostril as directed   • cholecalciferol (VITAMIN D-3) 125 mcg, Daily   • fluticasone (Flonase) 50 mcg/actuation nasal spray 2 sprays, Each Nostril, Daily, Shake gently. Before first use, prime pump. After use, clean tip and replace cap.   • lisinopril 20 mg, oral, Daily   • multivitamin tablet 1 tablet, Daily   • nystatin (MYCOSTATIN) 500,000 Units, oral, 2 times daily, Swish in mouth and spit out.      Allergies:  Antihistamine [diphenhydramine hcl], Levofloxacin, Prednisolone, and Tetracyclines    Review of Systems:   A comprehensive 10-point review of systems was obtained including constitutional, neurological, HEENT, pulmonary, cardiovascular, genito-urinary, and other pertinent systems and was negative except as noted in the HPI.     Objective  Physical Exam:  Last Recorded Vitals: Height 1.651 m (5' 5\"), weight 70.3 kg (155 lb), last menstrual period 01/01/2008.    On physical exam, the patient is a well-nourished, well-developed patient, in no acute distress, able to communicate without assistance in English language. Head and face is atraumatic and normocephalic. Salivary glands are intact. Facial strength is symmetrical bilaterally.       On ear examination:  Right ear: The patient has an open and patent ear canal. The tympanic membrane has an " effusion.  AC>BC  Left ear: The patient has cerumen impaction which was removed. The tympanic membrane is intact.  AC>BC  The Braden is midline    On vestibular exam, the patient has no spontaneous nystagmus, no headshake nystagmus, no head-thrust nystagmus, and no nystagmus on hyperventilation or Valsalva maneuvers. Austin-Hallpike maneuver is negative bilaterally.       On neuro exam, the patient is alert and oriented x3, cranial nerves are grossly intact, cerebellar exam is normal.      The rest of the exam, including anterior rhinoscopy, oropharyngeal exam, neck exam, and cardiovascular exam, were normal including no palpable lymphadenopathies, thyroid in the midline position, normal pulses, and normal chest excursion.       Reviewed Results:  Audiology Testing:   I personally reviewed the audiogram from 2/24/25 that showed:             Right ear: mixed moderate to severe mixed hearing loss . Discrimination: 56%             Left ear: mixed moderate to severe mixed hearing loss . Discrimination: 68%    Imaging:  None     Procedure:  None    Assessment/Plan    1. Dysfunction of both eustachian tubes    2. Sensorineural hearing loss (SNHL) of both ears      In summary, Reta Davis is a 65 y.o. female with bilateral mixed hearing loss with significant new conductive component compared to 2018 audiogram in the setting of recent influenza infection. She has bilateral mucoid effusions present with air fluid level.     In the last visit he elected to do conservative management with Flonase and azelastine. Since then, the serous effusion resolved. She still has baseline sensorineural hearing loss. I recommend she get hearing aids.     Follow-up in 1 year with an audiogram.        ____________________________________________________  Jose Alberto Glover MD  Professor and Chief   Otology/Neurotology/Lateral Skull-Base Surgery   [facility]    Scribe Attestation  By signing my name below, I,  Joseph De Jesus   attest that this documentation has been prepared under the direction and in the presence of Jose Alberto Geronimo MD.

## 2025-04-14 NOTE — PROGRESS NOTES
Reason for Consult:  Follow-up     Subjective   History Of Present Illness:  Reta Davis is a 65 y.o. female with bilateral mixed hearing loss with significant new conductive component compared to 2018 audiogram in the setting of recent influenza infection. She has bilateral mucoid effusions present with air fluid level. She elected to proceed with conservative options including nasal sprays and azelastine. Since then, her hearing has improved. She feels mild pressure sensation.     Past Medical History:  She has a past medical history of Anxiety, Asthma, Carcinoma of appendix, Depression, Depression, recurrent (CMS-HCC) (04/14/2023), Essential (primary) hypertension, Kidney stone (05/25/2023), Pneumonia due to infectious organism, unspecified laterality, unspecified part of lung (02/08/2025), and Thyroid nodule (02/27/2023).    Surgical History:  She has a past surgical history that includes Sinus surgery; Appendectomy (2017); Breast biopsy (Left, 2018); US guided thyroid biopsy (11/14/2016); Total abdominal hysterectomy w/ bilateral salpingoophorectomy (07/03/2018); Cystoscopy insertion / removal stent / stone (2021); Colonoscopy; and Breast biopsy (Left, 1990).     Social History:  She reports that she has never smoked. She has never been exposed to tobacco smoke. She has never used smokeless tobacco. She reports that she does not currently use alcohol. She reports that she does not currently use drugs.    Family History:  family history includes Breast cancer (age of onset: 63) in her mother; Heart attack in her father and mother; Heart failure in her mother; Hypertension in her father, mother, and sister; Stroke in her father and mother; malignant neoplasm of breast in her mother.     Medications:  Current Outpatient Medications   Medication Instructions    acetaminophen (TYLENOL) 500 mg, Every 4 hours PRN    albuterol 90 mcg/actuation inhaler 1 puff, inhalation, Every 4 hours PRN    azelastine  "(Astelin) 137 mcg (0.1 %) nasal spray 2 sprays, Each Nostril, 2 times daily, Use in each nostril as directed    cholecalciferol (VITAMIN D-3) 125 mcg, Daily    fluticasone (Flonase) 50 mcg/actuation nasal spray 2 sprays, Each Nostril, Daily, Shake gently. Before first use, prime pump. After use, clean tip and replace cap.    lisinopril 20 mg, oral, Daily    multivitamin tablet 1 tablet, Daily    nystatin (MYCOSTATIN) 500,000 Units, oral, 2 times daily, Swish in mouth and spit out.      Allergies:  Antihistamine [diphenhydramine hcl], Levofloxacin, Prednisolone, and Tetracyclines    Review of Systems:   A comprehensive 10-point review of systems was obtained including constitutional, neurological, HEENT, pulmonary, cardiovascular, genito-urinary, and other pertinent systems and was negative except as noted in the HPI.     Objective   Physical Exam:  Last Recorded Vitals: Height 1.651 m (5' 5\"), weight 70.3 kg (155 lb), last menstrual period 01/01/2008.    On physical exam, the patient is a well-nourished, well-developed patient, in no acute distress, able to communicate without assistance in English language. Head and face is atraumatic and normocephalic. Salivary glands are intact. Facial strength is symmetrical bilaterally.       On ear examination:  Right ear: The patient has an open and patent ear canal. The tympanic membrane has an effusion.  AC>BC  Left ear: The patient has cerumen impaction which was removed. The tympanic membrane is intact.  AC>BC  The Braden is midline    On vestibular exam, the patient has no spontaneous nystagmus, no headshake nystagmus, no head-thrust nystagmus, and no nystagmus on hyperventilation or Valsalva maneuvers. Ignacio-Hallpike maneuver is negative bilaterally.       On neuro exam, the patient is alert and oriented x3, cranial nerves are grossly intact, cerebellar exam is normal.      The rest of the exam, including anterior rhinoscopy, oropharyngeal exam, neck exam, and " cardiovascular exam, were normal including no palpable lymphadenopathies, thyroid in the midline position, normal pulses, and normal chest excursion.       Reviewed Results:  Audiology Testing:   I personally reviewed the audiogram from 2/24/25 that showed:             Right ear: mixed moderate to severe mixed hearing loss . Discrimination: 56%             Left ear: mixed moderate to severe mixed hearing loss . Discrimination: 68%    Imaging:  None     Procedure:  None    Assessment/Plan     1. Dysfunction of both eustachian tubes    2. Sensorineural hearing loss (SNHL) of both ears      In summary, Reta Davis is a 65 y.o. female with bilateral mixed hearing loss with significant new conductive component compared to 2018 audiogram in the setting of recent influenza infection. She has bilateral mucoid effusions present with air fluid level.     In the last visit he elected to do conservative management with Flonase and azelastine. Since then, the serous effusion resolved. She still has baseline sensorineural hearing loss. I recommend she get hearing aids.     Follow-up in 1 year with an audiogram.        ____________________________________________________  Jose Alberto Glover MD  Professor and Chief   Otology/Neurotology/Lateral Skull-Base Surgery   [facility]    Scribe Attestation  By signing my name below, I, Elidia Tomlin, Scribe   attest that this documentation has been prepared under the direction and in the presence of Jose Alberto Geronimo MD.

## 2025-04-24 ENCOUNTER — TREATMENT (OUTPATIENT)
Dept: OCCUPATIONAL THERAPY | Facility: CLINIC | Age: 65
End: 2025-04-24
Payer: MEDICARE

## 2025-04-24 DIAGNOSIS — M25.531 RIGHT WRIST PAIN: ICD-10-CM

## 2025-04-24 PROCEDURE — 97035 APP MDLTY 1+ULTRASOUND EA 15: CPT | Mod: GO

## 2025-04-24 PROCEDURE — 97110 THERAPEUTIC EXERCISES: CPT | Mod: GO

## 2025-04-24 PROCEDURE — 97140 MANUAL THERAPY 1/> REGIONS: CPT | Mod: GO

## 2025-04-24 NOTE — PROGRESS NOTES
Occupational Therapy   Occupational Therapy Treatment    Patient Name: Reta Davis  MRN: [id number]  Today's Date: 4/24/2025  Time Calculation  Start Time: 1550  Stop Time: 1635  Time Calculation (min): 45 min    Insurance:  Visit number: 2   Insurance Type: Payor: MEDICARE / Plan: MEDICARE PART A AND B / Product Type: *No Product type* /   Authorization or Plan of Care date Range: 4/7/2025 to 6/7/2025     Current Problem  1. Right wrist pain  Follow Up In Occupational Therapy        Precautions     SUBJECTIVE:   Patient reports she is wearing the comfort cool prefab brace during the day and night, removing to check skin. Compliant with MH and CP use at home.     Pain:    7-8 /10 with certain fxal activities  0/10 rest   Location: Left CMC/radial wrist   Description: burning, sharp     Performing HEP: Yes     OBJECTIVE:   No change in pain reports since initial eval    Treatment:    Modalities: 10 Min   Ultrasound tx:  3 MHZ  1.2 w/cm2 Continuous cycle over Left radial wrist/CMC    Area x 10 mins (small US head)    Therapeutic Exercise: 15   min   Review and completion of CMC stabilization exs issued at evaluation    Review of MH, CP rationale, frequency and precautions    Review/education on options for splint wear prefab, and custom splint for night    Manual Therapy: 15   min   Gentle STM over left radial wrist/CMC joint   STM over F/A musculature   Thumb distraction,  gentle PROM of thumb all planes    Therapeutic Activity: 5   min   Gave pt written and illustrated handouts regarding joint protection principles   with brief education on techs    Neuromuscular Re-education:  min    Orthosis:   min    Wound Care:     min    Self Care/ADL   min    Other Treatment:   min    ASSESSMENT:  No change in pain reports, despite good compliance with MH,CP , light exercise and splint wear. Pt receptive to joint protection education.     PLAN:   Continue with POC. Fabricate thermoplastic night thumb spica splint     Janet  Riri MS, OTR/L [id number]

## 2025-05-01 ENCOUNTER — TREATMENT (OUTPATIENT)
Dept: OCCUPATIONAL THERAPY | Facility: CLINIC | Age: 65
End: 2025-05-01
Payer: MEDICARE

## 2025-05-01 DIAGNOSIS — L65.9 HAIR LOSS: Primary | ICD-10-CM

## 2025-05-01 DIAGNOSIS — M25.531 RIGHT WRIST PAIN: Primary | ICD-10-CM

## 2025-05-01 PROCEDURE — 97760 ORTHOTIC MGMT&TRAING 1ST ENC: CPT | Mod: GO

## 2025-05-01 PROCEDURE — 97140 MANUAL THERAPY 1/> REGIONS: CPT | Mod: GO,59

## 2025-05-01 PROCEDURE — 97035 APP MDLTY 1+ULTRASOUND EA 15: CPT | Mod: GO

## 2025-05-01 NOTE — PROGRESS NOTES
"Occupational Therapy   Occupational Therapy Treatment    Patient Name: Reta Davis  MRN: [id number]  Today's Date: 5/2/2025  Time Calculation  Start Time: 1500  Stop Time: 1545  Time Calculation (min): 45 min    Insurance:  Visit number: 3   Insurance Type: Payor: MEDICARE / Plan: MEDICARE PART A AND B / Product Type: *No Product type* /   Authorization or Plan of Care date Range: 4/7/2025 to 6/7/2025     Current Problem  1. Right wrist pain  Follow Up In Occupational Therapy        Precautions     SUBJECTIVE:   Patient reports \"I am feeling a little better.\"     Pain:    5 /10 with certain fxal activities  0/10 rest   Location: Left CMC/radial wrist   Description: burning, sharp     Performing HEP: Yes     OBJECTIVE:   Decreased pain reports since last OT session     Treatment:    Modalities: 10 Min   Ultrasound tx:  3 MHZ  1.4 w/cm2 Continuous cycle over Left radial wrist/CMC    Area x 10 mins (small US head)    Therapeutic Exercise:    min     Manual Therapy: 15   min   Gentle STM over left radial wrist/CMC joint   STM over F/A musculature   Thumb distraction,  gentle PROM of thumb all planes    Therapeutic Activity:    min     Neuromuscular Re-education:  min    Orthosis:  20 min  Fabrication of a Left custom thermoplastic thumb spica splint completed  Wear at night, care, splinting precautions verbalized to patient.  Pt independent with donning/doffing in clinic correctly.     Wound Care:     min    Self Care/ADL   min    Other Treatment:   min    ASSESSMENT:  Patient reports good, comfortable fit with splint and verbalizes good understanding of wear time, care and precautions. Decreased pain reports to 5/10.     PLAN:   Continue with POC. Monitor splint fit and tolerance     Janet Menezes MS, OTR/L [id number]             "

## 2025-05-03 LAB
ALBUMIN SERPL-MCNC: 4.4 G/DL (ref 3.6–5.1)
ALP SERPL-CCNC: 53 U/L (ref 37–153)
ALT SERPL-CCNC: 15 U/L (ref 6–29)
ANION GAP SERPL CALCULATED.4IONS-SCNC: 11 MMOL/L (CALC) (ref 7–17)
AST SERPL-CCNC: 14 U/L (ref 10–35)
BASOPHILS # BLD AUTO: 36 CELLS/UL (ref 0–200)
BASOPHILS NFR BLD AUTO: 0.3 %
BILIRUB SERPL-MCNC: 0.7 MG/DL (ref 0.2–1.2)
BUN SERPL-MCNC: 12 MG/DL (ref 7–25)
CALCIUM SERPL-MCNC: 9.5 MG/DL (ref 8.6–10.4)
CHLORIDE SERPL-SCNC: 104 MMOL/L (ref 98–110)
CO2 SERPL-SCNC: 25 MMOL/L (ref 20–32)
CREAT SERPL-MCNC: 0.69 MG/DL (ref 0.5–1.05)
EGFRCR SERPLBLD CKD-EPI 2021: 95 ML/MIN/1.73M2
EOSINOPHIL # BLD AUTO: 12 CELLS/UL (ref 15–500)
EOSINOPHIL NFR BLD AUTO: 0.1 %
ERYTHROCYTE [DISTWIDTH] IN BLOOD BY AUTOMATED COUNT: 12.3 % (ref 11–15)
FERRITIN SERPL-MCNC: 100 NG/ML (ref 16–288)
GLUCOSE SERPL-MCNC: 133 MG/DL (ref 65–99)
HCT VFR BLD AUTO: 39.6 % (ref 35–45)
HGB BLD-MCNC: 13.1 G/DL (ref 11.7–15.5)
IRON SATN MFR SERPL: 6 % (CALC) (ref 16–45)
IRON SERPL-MCNC: 21 MCG/DL (ref 45–160)
LYMPHOCYTES # BLD AUTO: 456 CELLS/UL (ref 850–3900)
LYMPHOCYTES NFR BLD AUTO: 3.8 %
MCH RBC QN AUTO: 32 PG (ref 27–33)
MCHC RBC AUTO-ENTMCNC: 33.1 G/DL (ref 32–36)
MCV RBC AUTO: 96.6 FL (ref 80–100)
MONOCYTES # BLD AUTO: 1032 CELLS/UL (ref 200–950)
MONOCYTES NFR BLD AUTO: 8.6 %
NEUTROPHILS # BLD AUTO: ABNORMAL CELLS/UL (ref 1500–7800)
NEUTROPHILS NFR BLD AUTO: 87.2 %
PLATELET # BLD AUTO: 226 THOUSAND/UL (ref 140–400)
PMV BLD REES-ECKER: 10 FL (ref 7.5–12.5)
POTASSIUM SERPL-SCNC: 3.8 MMOL/L (ref 3.5–5.3)
PROT SERPL-MCNC: 7.3 G/DL (ref 6.1–8.1)
RBC # BLD AUTO: 4.1 MILLION/UL (ref 3.8–5.1)
SODIUM SERPL-SCNC: 140 MMOL/L (ref 135–146)
TIBC SERPL-MCNC: 368 MCG/DL (CALC) (ref 250–450)
TSH SERPL-ACNC: 1.64 MIU/L (ref 0.4–4.5)
WBC # BLD AUTO: 12 THOUSAND/UL (ref 3.8–10.8)

## 2025-05-06 ENCOUNTER — OFFICE VISIT (OUTPATIENT)
Dept: PRIMARY CARE | Facility: CLINIC | Age: 65
End: 2025-05-06
Payer: MEDICARE

## 2025-05-06 ENCOUNTER — HOSPITAL ENCOUNTER (OUTPATIENT)
Dept: RADIOLOGY | Facility: CLINIC | Age: 65
Discharge: HOME | End: 2025-05-06
Payer: MEDICARE

## 2025-05-06 VITALS
WEIGHT: 153.2 LBS | OXYGEN SATURATION: 93 % | HEART RATE: 106 BPM | TEMPERATURE: 97.4 F | DIASTOLIC BLOOD PRESSURE: 74 MMHG | SYSTOLIC BLOOD PRESSURE: 153 MMHG | BODY MASS INDEX: 25.49 KG/M2

## 2025-05-06 DIAGNOSIS — R05.1 ACUTE COUGH: Primary | ICD-10-CM

## 2025-05-06 DIAGNOSIS — R05.1 ACUTE COUGH: ICD-10-CM

## 2025-05-06 DIAGNOSIS — E61.1 IRON DEFICIENCY: ICD-10-CM

## 2025-05-06 PROCEDURE — 3077F SYST BP >= 140 MM HG: CPT | Performed by: STUDENT IN AN ORGANIZED HEALTH CARE EDUCATION/TRAINING PROGRAM

## 2025-05-06 PROCEDURE — 71046 X-RAY EXAM CHEST 2 VIEWS: CPT

## 2025-05-06 PROCEDURE — 71046 X-RAY EXAM CHEST 2 VIEWS: CPT | Performed by: RADIOLOGY

## 2025-05-06 PROCEDURE — 3078F DIAST BP <80 MM HG: CPT | Performed by: STUDENT IN AN ORGANIZED HEALTH CARE EDUCATION/TRAINING PROGRAM

## 2025-05-06 PROCEDURE — 1036F TOBACCO NON-USER: CPT | Performed by: STUDENT IN AN ORGANIZED HEALTH CARE EDUCATION/TRAINING PROGRAM

## 2025-05-06 PROCEDURE — 1160F RVW MEDS BY RX/DR IN RCRD: CPT | Performed by: STUDENT IN AN ORGANIZED HEALTH CARE EDUCATION/TRAINING PROGRAM

## 2025-05-06 PROCEDURE — 1159F MED LIST DOCD IN RCRD: CPT | Performed by: STUDENT IN AN ORGANIZED HEALTH CARE EDUCATION/TRAINING PROGRAM

## 2025-05-06 PROCEDURE — 99214 OFFICE O/P EST MOD 30 MIN: CPT | Performed by: STUDENT IN AN ORGANIZED HEALTH CARE EDUCATION/TRAINING PROGRAM

## 2025-05-06 RX ORDER — FERROUS GLUCONATE 325 MG
38 TABLET ORAL
Qty: 30 TABLET | Refills: 1 | Status: SHIPPED | OUTPATIENT
Start: 2025-05-06

## 2025-05-06 RX ORDER — BENZONATATE 100 MG/1
100 CAPSULE ORAL 3 TIMES DAILY PRN
Qty: 42 CAPSULE | Refills: 0 | Status: SHIPPED | OUTPATIENT
Start: 2025-05-06 | End: 2025-06-05

## 2025-05-06 RX ORDER — GUAIFENESIN 600 MG/1
1200 TABLET, EXTENDED RELEASE ORAL 2 TIMES DAILY
Qty: 120 TABLET | Refills: 11 | Status: SHIPPED | OUTPATIENT
Start: 2025-05-06 | End: 2026-05-06

## 2025-05-06 ASSESSMENT — ENCOUNTER SYMPTOMS
DEPRESSION: 0
SINUS PRESSURE: 0
OCCASIONAL FEELINGS OF UNSTEADINESS: 0
SINUS PAIN: 0
FATIGUE: 0
LOSS OF SENSATION IN FEET: 0
WHEEZING: 0
STRIDOR: 0
CHILLS: 0
FEVER: 0
SORE THROAT: 1
SHORTNESS OF BREATH: 0
DIAPHORESIS: 0
COUGH: 1

## 2025-05-06 NOTE — PROGRESS NOTES
[facility]  [address], [address]  [place] [address]    Subjective     Patient ID: Reta Davis is a 65 y.o. female who presents for  Hair/Scalp Problem (Hair loss, cough)     Hair loss.    Had labs. Low iron but no anemia. Had elevated WBC but is currently feeling sick.    Has some allergy issues with post nasal drainage and congestion. Trying netipot nasal rinse. Would like to try her nasal sprays again.    Asthma hx. Has rescue inhaler is taking it and does help.             Review of Systems   Constitutional:  Negative for chills, diaphoresis, fatigue and fever.   HENT:  Positive for congestion and sore throat. Negative for sinus pressure, sinus pain and sneezing.    Respiratory:  Positive for cough. Negative for shortness of breath, wheezing and stridor.    Cardiovascular:  Negative for chest pain.       Social History[1]  Family History[2]  RX Allergies[3]   Current Medications[4]    /74 (BP Location: Left arm, Patient Position: Sitting, BP Cuff Size: Adult)   Pulse 106   Temp 36.3 °C (97.4 °F) (Temporal)   Wt 69.5 kg (153 lb 3.2 oz)   LMP 01/01/2008 (Approximate)   SpO2 93%   BMI 25.49 kg/m²      Objective   Physical Exam  Vitals reviewed.   Constitutional:       Appearance: Normal appearance.   Eyes:      Extraocular Movements: Extraocular movements intact.      Pupils: Pupils are equal, round, and reactive to light.   Cardiovascular:      Rate and Rhythm: Normal rate and regular rhythm.      Pulses: Normal pulses.      Heart sounds: Normal heart sounds.   Pulmonary:      Effort: Pulmonary effort is normal.      Breath sounds: Wheezing (mild focal on the RUL) present.   Musculoskeletal:         General: Normal range of motion.      Cervical back: Normal range of motion and neck supple.   Neurological:      General: No focal deficit present.      Mental Status: She is alert.   Psychiatric:         Mood and Affect: Mood normal.         Behavior: Behavior normal.  "          Labs:   Lab Results   Component Value Date    WBC 12.0 (H) 05/02/2025    HGB 13.1 05/02/2025    HCT 39.6 05/02/2025     05/02/2025    TSH 1.64 05/02/2025    INR 1.0 03/26/2021     Lab Results   Component Value Date     05/02/2025    K 3.8 05/02/2025     05/02/2025    BUN 12 05/02/2025    CREATININE 0.69 05/02/2025    GLUCOSE 133 (H) 05/02/2025    CALCIUM 9.5 05/02/2025    PROT 7.3 05/02/2025    BILITOT 0.7 05/02/2025    ALKPHOS 53 05/02/2025    AST 14 05/02/2025    ALT 15 05/02/2025     Lab Results   Component Value Date    CHOL 233 (H) 09/30/2024    CHOL 212 (H) 04/18/2024    CHOL 244 (H) 11/07/2023      Lab Results   Component Value Date    TRIG 107 09/30/2024    TRIG 115 04/18/2024    TRIG 87 11/07/2023      Lab Results   Component Value Date    HDL 61.4 09/30/2024    HDL 60.6 04/18/2024    HDL 77.8 11/07/2023     Lab Results   Component Value Date    LDLCALC 150 (H) 09/30/2024    LDLCALC 128 (H) 04/18/2024    LDLCALC 149 (H) 11/07/2023      Lab Results   Component Value Date    VLDL 21 09/30/2024    VLDL 23 04/18/2024    VLDL 17 11/07/2023    No components found for: \"[id number]\"    No data recorded    Imaging/Testing: XR hand right 3+ views  Narrative: Interpreted By:  Andrew Roper,   STUDY:  XR HAND RIGHT 3+ VIEWS; ;  4/4/2025 11:01 am      INDICATION:  Signs/Symptoms:right wrist pain after injury.      ,M25.531 Pain in right wrist      COMPARISON:  None.      ACCESSION NUMBER(S):  [id number]      ORDERING CLINICIAN:  SHERRY DC      FINDINGS:  Right hand, three views      There is no fracture. There is no dislocation. There are no  degenerative changes. There is no lytic or sclerotic lesion. There is  no soft tissue abnormality seen.      Impression: Normal radiographs of the right hand          MACRO:  None      Signed by: Andrew Roper 4/5/2025 7:53 AM  Dictation workstation:   [id number]    Assessment/Plan   Problem List Items Addressed This Visit    None  Visit " "Diagnoses         Acute cough    -  Primary    Relevant Medications    guaiFENesin (Mucinex) 600 mg 12 hr tablet    benzonatate (Tessalon) 100 mg capsule    Other Relevant Orders    XR chest 2 views      Iron deficiency        Relevant Medications    ferrous gluconate (Fergon) 324 (38 Fe) mg tablet    Other Relevant Orders    CBC and Auto Differential    Iron and TIBC    Ferritin          Acute cough - XRAY dt focal finding. Sx control, use rescue inhaler prn. No red flag sx currently. Discussed if any changes, call office. Likely 2/2 allergies.    Iron def - likely cause of hair thinning. Oral supplement. Recheck labs 1 month.      current treatment plan is effective, no change in therapy, orders and follow up as documented in EMR, lab results reviewed with patient, repeat labs ordered prior to next appointment, reviewed compliance with lifestyle measures, reviewed diet, exercise and weight control, reviewed medications and side effects in detail     Return visit in 1 months.  Iron deficiency         SHERRY DC MD, [facility]  Department of Family Medicine of [facility] - [facility]         [1]   Social History  Tobacco Use    Smoking status: Never     Passive exposure: Never    Smokeless tobacco: Never   Vaping Use    Vaping status: Never Used   Substance Use Topics    Alcohol use: Not Currently    Drug use: Not Currently   [2]   Family History  Problem Relation Name Age of Onset    Breast cancer Mother  63    Heart failure Mother      Hypertension Mother      Stroke Mother      Heart attack Mother      Other (malignant neoplasm of breast) Mother      Heart attack Father      Stroke Father      Hypertension Father      Hypertension Sister     [3]   Allergies  Allergen Reactions    Antihistamine [Diphenhydramine Hcl] Unknown     Pt states \"I have allergy induced asthma and the antihistamines make my saliva really thick\"    Levofloxacin Other and Swelling    Prednisolone Other     " thrush    Tetracyclines Rash and Unknown   [4]   Current Outpatient Medications   Medication Sig Dispense Refill    acetaminophen (Tylenol) 160 mg/5 mL (5 mL) suspension Take 15 mL (480 mg) by mouth every 4 hours if needed for mild pain (1 - 3) or fever (temp greater than 38.0 C).      azelastine (Astelin) 137 mcg (0.1 %) nasal spray Administer 2 sprays into each nostril 2 times a day. Use in each nostril as directed 30 mL 11    cholecalciferol (Vitamin D-3) 125 MCG (5000 UT) capsule Take 1 capsule (125 mcg) by mouth once daily.      fluticasone (Flonase) 50 mcg/actuation nasal spray Administer 2 sprays into each nostril once daily. Shake gently. Before first use, prime pump. After use, clean tip and replace cap. 16 g 11    lisinopril 20 mg tablet Take 1 tablet (20 mg) by mouth once daily. 90 tablet 1    multivitamin tablet Take 1 tablet by mouth once daily.      nystatin (Mycostatin) 100,000 unit/mL suspension Take 5 mL (500,000 Units) by mouth 2 times a day. Swish in mouth and spit out. 60 mL 0    albuterol 90 mcg/actuation inhaler Inhale 1 puff every 4 hours if needed for wheezing. 18 g 3    benzonatate (Tessalon) 100 mg capsule Take 1 capsule (100 mg) by mouth 3 times a day as needed for cough. Do not crush or chew. 42 capsule 0    ferrous gluconate (Fergon) 324 (38 Fe) mg tablet Take 1 tablet (38 mg of iron) by mouth once daily with breakfast. 30 tablet 1    guaiFENesin (Mucinex) 600 mg 12 hr tablet Take 2 tablets (1,200 mg) by mouth 2 times a day. Do not crush, chew, or split. 120 tablet 11     No current facility-administered medications for this visit.

## 2025-05-08 ENCOUNTER — APPOINTMENT (OUTPATIENT)
Dept: OCCUPATIONAL THERAPY | Facility: CLINIC | Age: 65
End: 2025-05-08
Payer: MEDICARE

## 2025-05-08 DIAGNOSIS — J18.9 COMMUNITY ACQUIRED PNEUMONIA, UNSPECIFIED LATERALITY: Primary | ICD-10-CM

## 2025-05-08 DIAGNOSIS — B37.9 YEAST INFECTION: ICD-10-CM

## 2025-05-08 RX ORDER — AMOXICILLIN AND CLAVULANATE POTASSIUM 875; 125 MG/1; MG/1
875 TABLET, FILM COATED ORAL 2 TIMES DAILY
Qty: 10 TABLET | Refills: 0 | Status: SHIPPED | OUTPATIENT
Start: 2025-05-08 | End: 2025-05-13

## 2025-05-08 RX ORDER — AZITHROMYCIN 250 MG/1
TABLET, FILM COATED ORAL
Qty: 6 TABLET | Refills: 0 | Status: SHIPPED | OUTPATIENT
Start: 2025-05-08 | End: 2025-05-13

## 2025-05-08 RX ORDER — FLUCONAZOLE 150 MG/1
150 TABLET ORAL ONCE
Qty: 1 TABLET | Refills: 0 | Status: SHIPPED | OUTPATIENT
Start: 2025-05-08 | End: 2025-05-08

## 2025-05-15 ENCOUNTER — TREATMENT (OUTPATIENT)
Dept: OCCUPATIONAL THERAPY | Facility: CLINIC | Age: 65
End: 2025-05-15
Payer: MEDICARE

## 2025-05-15 DIAGNOSIS — M25.531 RIGHT WRIST PAIN: Primary | ICD-10-CM

## 2025-05-15 PROCEDURE — 97022 WHIRLPOOL THERAPY: CPT | Mod: GO

## 2025-05-15 PROCEDURE — 97140 MANUAL THERAPY 1/> REGIONS: CPT | Mod: GO

## 2025-05-15 PROCEDURE — 97035 APP MDLTY 1+ULTRASOUND EA 15: CPT | Mod: GO

## 2025-05-15 NOTE — PROGRESS NOTES
"Occupational Therapy   Occupational Therapy Treatment    Patient Name: Reta Davis  MRN: [id number]  Today's Date: 5/15/2025  Time Calculation  Start Time: 1145  Stop Time: 1230  Time Calculation (min): 45 min    Insurance:  Visit number: 4   Insurance Type: Payor: MEDICARE / Plan: MEDICARE PART A AND B / Product Type: *No Product type* /   Authorization or Plan of Care date Range: 4/7/2025 to 6/7/2025     Current Problem  1. Right wrist pain  Follow Up In Occupational Therapy        Precautions     SUBJECTIVE:   Patient reports good compliance with custom thermoplastic thumb spica splint at night. \"Pain is a lot less.\" \"I can wash my face and brush teeth better\"     Pain:   3 /10 with \"twisting\" motion, resolves quickly.  0/10 rest   Location: Left CMC/radial wrist   Description: burning, sharp     Performing HEP: Yes     OBJECTIVE :  HAND STRENGTH (Lbs)   R L   Dynamometer  54 from 52    Lateral Pinch 9 from 8.5    3jaw Pinch  Tip Pinch  10 from 7.5  6.5 from 5       Treatment:    Modalities: 15, 10 Min   Ultrasound tx:  3 MHZ  1.4 w/cm2 Continuous cycle over Left radial wrist/CMC    Area x 10 mins (small US head)   Fluidotherapy treatment Right forearm, wrist and hand with AROM x 15 min    Therapeutic Exercise:  5  min   Objective measurements taken. See above for details  UPGRADED HEP: Patient instructed on and completed with use of handout as  follows: Issued yellow therapy ball for progressed , lateral, 3pt pinch   Written and illustrated handouts issued to patient. Precautions not to overdue   Also, review of CMC stabilization exs    Manual Therapy: 15   min   Gentle STM over left radial wrist/CMC joint   STM over F/A musculature   Thumb distraction,  gentle PROM of thumb all planes    Therapeutic Activity:    min     Neuromuscular Re-education:  min    Orthosis:   min    Wound Care:     min    Self Care/ADL   min    Other Treatment:   min    ASSESSMENT:  Good tolerance to /pinch assessment with " no pain reports. Slight increases.  Improved ability for using dominant hand for daily activities, ie grooming.    PLAN:   Continue with POC. Begin weaning of splint wear during the day, as tolerated.    Janet Menezes MS, OTR/L [id number]

## 2025-05-22 ENCOUNTER — TREATMENT (OUTPATIENT)
Dept: OCCUPATIONAL THERAPY | Facility: CLINIC | Age: 65
End: 2025-05-22
Payer: MEDICARE

## 2025-05-22 DIAGNOSIS — M25.531 RIGHT WRIST PAIN: Primary | ICD-10-CM

## 2025-05-22 PROCEDURE — 97022 WHIRLPOOL THERAPY: CPT | Mod: GO

## 2025-05-22 PROCEDURE — 97140 MANUAL THERAPY 1/> REGIONS: CPT | Mod: GO

## 2025-05-22 PROCEDURE — 97035 APP MDLTY 1+ULTRASOUND EA 15: CPT | Mod: GO

## 2025-05-22 PROCEDURE — 97110 THERAPEUTIC EXERCISES: CPT | Mod: GO

## 2025-05-22 NOTE — PROGRESS NOTES
Occupational Therapy   Occupational Therapy Treatment    Patient Name: Reta Davis  MRN: [id number]  Today's Date: 5/22/2025  Time Calculation  Start Time: 1145  Stop Time: 1240  Time Calculation (min): 55 min    Insurance:  Visit number: 5  Insurance Type: Payor: MEDICARE / Plan: MEDICARE PART A AND B / Product Type: *No Product type* /   Authorization or Plan of Care date Range: 4/7/2025 to 6/7/2025     Current Problem  1. Right wrist pain  Follow Up In Occupational Therapy        Precautions     SUBJECTIVE:   A little more sore lately. Pt completing yellow ball /pinch program 1-2 x day.  Pt also has reduced prefab splint wear for activities.     Pain:   Pt did not rate on pain scale   Location: Left CMC/radial wrist   Description: burning, sharp     Performing HEP: Yes     OBJECTIVE :    Treatment:    Modalities: 15, 10 Min   Ultrasound tx:  3 MHZ  1.5 w/cm2 Continuous cycle over Left radial wrist/CMC    Area x 10 mins (small US head)   Fluidotherapy treatment Right forearm, wrist and hand with AROM x 15 min    Therapeutic Exercise:  15 min   UPGRADED HEP: Patient instructed on and completed with use of handout as follows:   Pink foam block issued for alternative to lateral/3 pt pinch strength in HEP   Advanced CMC stabilization ex: Isometric thumb abduction hold gently 5 secs x 5 reps    Education/discussion: joint protection principles with use of illustrated, written handouts   issued to pt    Manual Therapy: 15   min   Gentle STM over left radial wrist/CMC joint   STM over F/A musculature   Thumb distraction,  gentle PROM of thumb all planes    Therapeutic Activity:    min     Neuromuscular Re-education:  min    Orthosis:   min    Wound Care:     min    Self Care/ADL   min    Other Treatment:   min    ASSESSMENT:  Pt receptive to above joint protection education. Soreness continued to be reported post tx session, however may have been due to weather changes, overdoing HEP. Pt pain reports with pinch using  foam block.     PLAN:   Continue with POC. Monitor pain/soreness complaints with modified HEP    Janet Menezes MS, OTR/L [id number]

## 2025-05-29 ENCOUNTER — TREATMENT (OUTPATIENT)
Dept: OCCUPATIONAL THERAPY | Facility: CLINIC | Age: 65
End: 2025-05-29
Payer: MEDICARE

## 2025-05-29 DIAGNOSIS — M25.531 RIGHT WRIST PAIN: Primary | ICD-10-CM

## 2025-05-29 PROCEDURE — 97035 APP MDLTY 1+ULTRASOUND EA 15: CPT | Mod: GO

## 2025-05-29 PROCEDURE — 97530 THERAPEUTIC ACTIVITIES: CPT | Mod: GO

## 2025-05-29 PROCEDURE — 97022 WHIRLPOOL THERAPY: CPT | Mod: GO

## 2025-05-29 NOTE — PROGRESS NOTES
"Occupational Therapy   Occupational Therapy Treatment    Patient Name: Reta Davis  MRN: [id number]  Today's Date: 5/29/2025  Time Calculation  Start Time: 1150  Stop Time: 1230  Time Calculation (min): 40 min    Insurance:  Visit number: 6  Insurance Type: Payor: MEDICARE / Plan: MEDICARE PART A AND B / Product Type: *No Product type* /   Authorization or Plan of Care date Range: 4/7/2025 to 6/7/2025     Current Problem  1. Right wrist pain  Follow Up In Occupational Therapy        Precautions     SUBJECTIVE:   \"The support of the brace helps\" Pt reports good tolerance to using right hand for grooming, but continues to report of challenge with turning a doorknob.     Pain:  3/10  twisting/turning motion only   Rest 0/10   Location: Left CMC/radial wrist   Description: Sharp     Performing HEP: Yes     OBJECTIVE :  Stiffness of wrist and thumb persists , especially in A.M hours     Treatment:    Modalities: 15, 10 Min   Ultrasound tx:  3 MHZ  1.5 w/cm2 Continuous cycle over Left radial wrist/CMC    Area x 10 mins (small US head)   Fluidotherapy treatment Right forearm, wrist and hand with AROM x 15 min    Therapeutic Exercise:   min     Manual Therapy:    min    Therapeutic Activity: 15  min   Pt education /discussion on HEP tolerance and progression.    Splint wear options, guidelines during day PRN and night wear rationale for   positioning.    Neuromuscular Re-education:  min    Orthosis:   min    Wound Care:     min    Self Care/ADL   min    Other Treatment:   min    ASSESSMENT:  Decreasing need for splint wear during the day time, mostly with only heavier fxal activities. Improved tolerance and ability with using hand for ADLS, grooming.   Pain reports slowing improving.     PLAN:   Continue with POC. Assess /pinch NV     Janet Menezes MS, OTR/L [id number]                         "

## 2025-06-05 LAB
BASOPHILS # BLD AUTO: 61 CELLS/UL (ref 0–200)
BASOPHILS NFR BLD AUTO: 1.2 %
EOSINOPHIL # BLD AUTO: 179 CELLS/UL (ref 15–500)
EOSINOPHIL NFR BLD AUTO: 3.5 %
ERYTHROCYTE [DISTWIDTH] IN BLOOD BY AUTOMATED COUNT: 12.4 % (ref 11–15)
FERRITIN SERPL-MCNC: 77 NG/ML (ref 16–288)
HCT VFR BLD AUTO: 44.5 % (ref 35–45)
HGB BLD-MCNC: 14 G/DL (ref 11.7–15.5)
IRON SATN MFR SERPL: 27 % (CALC) (ref 16–45)
IRON SERPL-MCNC: 111 MCG/DL (ref 45–160)
LYMPHOCYTES # BLD AUTO: 1173 CELLS/UL (ref 850–3900)
LYMPHOCYTES NFR BLD AUTO: 23 %
MCH RBC QN AUTO: 30.8 PG (ref 27–33)
MCHC RBC AUTO-ENTMCNC: 31.5 G/DL (ref 32–36)
MCV RBC AUTO: 98 FL (ref 80–100)
MONOCYTES # BLD AUTO: 347 CELLS/UL (ref 200–950)
MONOCYTES NFR BLD AUTO: 6.8 %
NEUTROPHILS # BLD AUTO: 3341 CELLS/UL (ref 1500–7800)
NEUTROPHILS NFR BLD AUTO: 65.5 %
PLATELET # BLD AUTO: 240 THOUSAND/UL (ref 140–400)
PMV BLD REES-ECKER: 10.1 FL (ref 7.5–12.5)
RBC # BLD AUTO: 4.54 MILLION/UL (ref 3.8–5.1)
TIBC SERPL-MCNC: 407 MCG/DL (CALC) (ref 250–450)
WBC # BLD AUTO: 5.1 THOUSAND/UL (ref 3.8–10.8)

## 2025-06-06 ENCOUNTER — APPOINTMENT (OUTPATIENT)
Dept: PRIMARY CARE | Facility: CLINIC | Age: 65
End: 2025-06-06
Payer: MEDICARE

## 2025-06-06 VITALS
SYSTOLIC BLOOD PRESSURE: 122 MMHG | WEIGHT: 158.2 LBS | TEMPERATURE: 97.7 F | HEART RATE: 83 BPM | OXYGEN SATURATION: 94 % | BODY MASS INDEX: 26.33 KG/M2 | DIASTOLIC BLOOD PRESSURE: 78 MMHG

## 2025-06-06 DIAGNOSIS — I10 BENIGN ESSENTIAL HYPERTENSION: ICD-10-CM

## 2025-06-06 DIAGNOSIS — E61.1 IRON DEFICIENCY: Primary | ICD-10-CM

## 2025-06-06 PROCEDURE — 99214 OFFICE O/P EST MOD 30 MIN: CPT | Performed by: STUDENT IN AN ORGANIZED HEALTH CARE EDUCATION/TRAINING PROGRAM

## 2025-06-06 PROCEDURE — 1159F MED LIST DOCD IN RCRD: CPT | Performed by: STUDENT IN AN ORGANIZED HEALTH CARE EDUCATION/TRAINING PROGRAM

## 2025-06-06 PROCEDURE — 1160F RVW MEDS BY RX/DR IN RCRD: CPT | Performed by: STUDENT IN AN ORGANIZED HEALTH CARE EDUCATION/TRAINING PROGRAM

## 2025-06-06 PROCEDURE — 3074F SYST BP LT 130 MM HG: CPT | Performed by: STUDENT IN AN ORGANIZED HEALTH CARE EDUCATION/TRAINING PROGRAM

## 2025-06-06 PROCEDURE — 1036F TOBACCO NON-USER: CPT | Performed by: STUDENT IN AN ORGANIZED HEALTH CARE EDUCATION/TRAINING PROGRAM

## 2025-06-06 PROCEDURE — 3078F DIAST BP <80 MM HG: CPT | Performed by: STUDENT IN AN ORGANIZED HEALTH CARE EDUCATION/TRAINING PROGRAM

## 2025-06-06 ASSESSMENT — ENCOUNTER SYMPTOMS
LOSS OF SENSATION IN FEET: 0
DEPRESSION: 0
OCCASIONAL FEELINGS OF UNSTEADINESS: 0

## 2025-06-06 NOTE — PROGRESS NOTES
[facility]  [address], [address]  [place] [address]    Subjective     Patient ID: Reta Davis is a 65 y.o. female who presents for  Follow-up         Her iron levels improved.    Her BP at home were 109-120s/60-70s.     Hair loss improving, but still having some thinning.    Review of Systems   All other systems reviewed and are negative.      Social History[1]  Family History[2]  RX Allergies[3]   Current Medications[4]    /78 (BP Location: Right arm, Patient Position: Sitting, BP Cuff Size: Adult)   Pulse 83   Temp 36.5 °C (97.7 °F) (Temporal)   Wt 71.8 kg (158 lb 3.2 oz)   LMP 01/01/2008 (Approximate)   SpO2 94%   BMI 26.33 kg/m²      Objective   Physical Exam  Vitals reviewed.   Constitutional:       Appearance: Normal appearance.   Eyes:      Extraocular Movements: Extraocular movements intact.      Pupils: Pupils are equal, round, and reactive to light.   Cardiovascular:      Rate and Rhythm: Normal rate and regular rhythm.      Pulses: Normal pulses.      Heart sounds: Normal heart sounds.   Pulmonary:      Effort: Pulmonary effort is normal.      Breath sounds: Normal breath sounds.   Musculoskeletal:         General: Normal range of motion.      Cervical back: Normal range of motion and neck supple.   Neurological:      General: No focal deficit present.      Mental Status: She is alert.   Psychiatric:         Mood and Affect: Mood normal.         Behavior: Behavior normal.           Labs:   Lab Results   Component Value Date    WBC 5.1 06/04/2025    HGB 14.0 06/04/2025    HCT 44.5 06/04/2025     06/04/2025    TSH 1.64 05/02/2025    INR 1.0 03/26/2021     Lab Results   Component Value Date     05/02/2025    K 3.8 05/02/2025     05/02/2025    BUN 12 05/02/2025    CREATININE 0.69 05/02/2025    GLUCOSE 133 (H) 05/02/2025    CALCIUM 9.5 05/02/2025    PROT 7.3 05/02/2025    BILITOT 0.7 05/02/2025    ALKPHOS 53 05/02/2025    AST 14 05/02/2025  "   ALT 15 05/02/2025     Lab Results   Component Value Date    CHOL 233 (H) 09/30/2024    CHOL 212 (H) 04/18/2024    CHOL 244 (H) 11/07/2023      Lab Results   Component Value Date    TRIG 107 09/30/2024    TRIG 115 04/18/2024    TRIG 87 11/07/2023      Lab Results   Component Value Date    HDL 61.4 09/30/2024    HDL 60.6 04/18/2024    HDL 77.8 11/07/2023     Lab Results   Component Value Date    LDLCALC 150 (H) 09/30/2024    LDLCALC 128 (H) 04/18/2024    LDLCALC 149 (H) 11/07/2023      Lab Results   Component Value Date    VLDL 21 09/30/2024    VLDL 23 04/18/2024    VLDL 17 11/07/2023    No components found for: \"CHOLHDLRATI0\"    No data recorded    Imaging/Testing: XR chest 2 views  Narrative: Interpreted By:  Tomer Uribe,   STUDY:  XR CHEST 2 VIEWS      INDICATION:  Signs/Symptoms:cough.      COMPARISON:  February 11      ACCESSION NUMBER(S):  [id number]      ORDERING CLINICIAN:  SHERRY DC      FINDINGS:  Streaky retrocardiac airspace disease again noted grossly similar to  the previous studies.      There is no evidence of large effusion. Heart size within normal  limits.      Impression: Retrocardiac airspace disease is grossly similar to the previous  examination from February 10, 2025 and suggests ongoing pneumonia.      Signed by: Tomer Uribe 5/7/2025 6:27 PM  Dictation workstation:   [id number]    Assessment/Plan   Problem List Items Addressed This Visit    None  Visit Diagnoses         Iron deficiency    -  Primary    Relevant Orders    CBC and Auto Differential    Iron and TIBC    Ferritin    Reticulocytes          Iron def - acute issue, improving, subsequent encounter. Recheck in 3 months. Continue one more month of iron pills. Likely nutritional.    HTN - stable, continue to monitor. Bring BP cuff on next appointment.      current treatment plan is effective, no change in therapy, orders and follow up as documented in EMR, lab results reviewed with patient, repeat labs ordered prior to next " "appointment, reviewed medications and side effects in detail     Return visit in 3 months.  Recheck carolann DC MD, [facility]  Department of Family Medicine of [facility]         [1]   Social History  Tobacco Use    Smoking status: Never     Passive exposure: Never    Smokeless tobacco: Never   Vaping Use    Vaping status: Never Used   Substance Use Topics    Alcohol use: Not Currently    Drug use: Not Currently   [2]   Family History  Problem Relation Name Age of Onset    Breast cancer Mother  63    Heart failure Mother      Hypertension Mother      Stroke Mother      Heart attack Mother      Other (malignant neoplasm of breast) Mother      Heart attack Father      Stroke Father      Hypertension Father      Hypertension Sister     [3]   Allergies  Allergen Reactions    Antihistamine [Diphenhydramine Hcl] Unknown     Pt states \"I have allergy induced asthma and the antihistamines make my saliva really thick\"    Levofloxacin Other and Swelling    Prednisolone Other     thrush    Tetracyclines Rash and Unknown   [4]   Current Outpatient Medications   Medication Sig Dispense Refill    acetaminophen (Tylenol) 160 mg/5 mL (5 mL) suspension Take 15 mL (480 mg) by mouth every 4 hours if needed for mild pain (1 - 3) or fever (temp greater than 38.0 C).      azelastine (Astelin) 137 mcg (0.1 %) nasal spray Administer 2 sprays into each nostril 2 times a day. Use in each nostril as directed 30 mL 11    cholecalciferol (Vitamin D-3) 125 MCG (5000 UT) capsule Take 1 capsule (125 mcg) by mouth once daily.      ferrous gluconate (Fergon) 324 (38 Fe) mg tablet Take 1 tablet (38 mg of iron) by mouth once daily with breakfast. 30 tablet 1    fluticasone (Flonase) 50 mcg/actuation nasal spray Administer 2 sprays into each nostril once daily. Shake gently. Before first use, prime pump. After use, clean tip and replace cap. 16 g 11    guaiFENesin (Mucinex) 600 mg 12 hr tablet " Take 2 tablets (1,200 mg) by mouth 2 times a day. Do not crush, chew, or split. 120 tablet 11    lisinopril 20 mg tablet Take 1 tablet (20 mg) by mouth once daily. 90 tablet 1    multivitamin tablet Take 1 tablet by mouth once daily.      nystatin (Mycostatin) 100,000 unit/mL suspension Take 5 mL (500,000 Units) by mouth 2 times a day. Swish in mouth and spit out. 60 mL 0    albuterol 90 mcg/actuation inhaler Inhale 1 puff every 4 hours if needed for wheezing. 18 g 3     No current facility-administered medications for this visit.

## 2025-06-12 ENCOUNTER — TREATMENT (OUTPATIENT)
Dept: OCCUPATIONAL THERAPY | Facility: CLINIC | Age: 65
End: 2025-06-12
Payer: MEDICARE

## 2025-06-12 DIAGNOSIS — M25.531 RIGHT WRIST PAIN: Primary | ICD-10-CM

## 2025-06-12 PROCEDURE — 97140 MANUAL THERAPY 1/> REGIONS: CPT | Mod: GO

## 2025-06-12 PROCEDURE — 97110 THERAPEUTIC EXERCISES: CPT | Mod: GO

## 2025-06-12 PROCEDURE — 97022 WHIRLPOOL THERAPY: CPT | Mod: GO

## 2025-06-12 PROCEDURE — 97035 APP MDLTY 1+ULTRASOUND EA 15: CPT | Mod: GO

## 2025-06-12 NOTE — PROGRESS NOTES
"Occupational Therapy   Occupational Therapy Treatment    Patient Name: Reta Davis  MRN: [id number]  Today's Date: 2025  Time Calculation  Start Time: 1115  Stop Time: 1210  Time Calculation (min): 55 min    Insurance:  Visit number: 7  Insurance Type: Payor: MEDICARE / Plan: MEDICARE PART A AND B / Product Type: *No Product type* /   Authorization or Plan of Care date Range: 2025 to 2025          Recert Medicare Dates: 2025 to 2025    Current Problem  1. Right wrist pain  Follow Up In Occupational Therapy        Precautions     SUBJECTIVE:    \"I do think I am progressing\" Pt denies pain reports in right wrist/thumb with feeding, grooming and ADL activity. Improved tolerance to pinch strengthening using yellow ball versus foam block. Reassessment completed.     Pain :      Rest 0/10  Intermittent 2-3/10   Location: Left CMC/radial wrist   Description: Sharp     Performing HEP: Yes     OBJECTIVE :  HAND STRENGTH (Lbs)   R L   Dynamometer  46 53   Lateral Pinch 8.5 NE   3jaw Pinch  Tip Pinch  8.5  6.5  NE  NE      Treatment:    Modalities: 15, 10 Min   Ultrasound tx:  3 MHZ  1.5 w/cm2 Continuous cycle over Left radial wrist/CMC    Area x 10 mins (small US head)   Fluidotherapy treatment Right forearm, wrist and hand with AROM x 15 min    Therapeutic Exercise: 20  min   Objective measurements taken. See above for details   UPGRADED HEP: Patient instructed on and completed with use of handout as follows:   UE strengthenin-2# wrist flexion, G- extension, RD x 10 each.    Extensive review of thumb extension AROM ex 5 reps x 2 sets   Wrist flexor/extensor stretches x 1 each (Added to HEP)     Manual Therapy:  10  min  STM forearm muscles  PROM/AAROM wrist , F/A, thumb all planes   Thumb Distraction     Therapeutic Activity:  min     Neuromuscular Re-education:  min    Orthosis:   min    Wound Care:     min    Self Care/ADL   min    Other Treatment:   min    ASSESSMENT:  Pt progressing slowly " and steadily with decreased pain reports with fxal activities. No pain reports with /pinch reassess as previous. Good challenge and tolerance to progressed HEP , with no increases in pain levels.     PLAN:   Active       OT Goals       Patient to be independent with HEP and pain reduction techs to further fxal progress and ability with decreased pain levels to 2-3/10 or less.   (Progressing)       Start:  04/07/25    Expected End:  08/07/25            Patient to be independent with conservative management principles of diagnosis, including splinting, home modalities and joint protection/UE lifting techniques in order to decrease overall pain and symptoms of UE for improved fxal use.   (Met)       Start:  04/07/25    Expected End:  06/06/25    Resolved:  06/12/25         Patient to increase lateral pinch to 10# or better to open containers, jars. (Progressing)       Start:  04/07/25    Expected End:  08/07/25            Patient to increase 3 pt pinch strength to 9# or better open/close, manipulate household objects.  (Progressing)       Start:  04/07/25    Expected End:  08/07/25               OT Problem       PATIENT STATED GOAL: Patient to use right arm/hand for feeding, grooming and ADLS with increase ease and ability by 50%.  (Progressing)       Start:  04/07/25    Expected End:  08/07/25              Continue with POC. Schedule additional 3-4 visits for gentle strength progression.    Janet Menezes MS, OTR/L [id number]

## 2025-06-19 ENCOUNTER — TREATMENT (OUTPATIENT)
Dept: OCCUPATIONAL THERAPY | Facility: CLINIC | Age: 65
End: 2025-06-19
Payer: MEDICARE

## 2025-06-19 DIAGNOSIS — M25.531 RIGHT WRIST PAIN: Primary | ICD-10-CM

## 2025-06-19 PROCEDURE — 97035 APP MDLTY 1+ULTRASOUND EA 15: CPT | Mod: GO

## 2025-06-19 PROCEDURE — 97140 MANUAL THERAPY 1/> REGIONS: CPT | Mod: GO

## 2025-06-19 PROCEDURE — 97022 WHIRLPOOL THERAPY: CPT | Mod: GO

## 2025-06-19 NOTE — PROGRESS NOTES
"Occupational Therapy   Occupational Therapy Treatment    Patient Name: Reta Davis  MRN: [id number]  Today's Date: 6/19/2025  Time Calculation  Start Time: 1145  Stop Time: 1225  Time Calculation (min): 40 min    Insurance:  Visit number: 8  Insurance Type: Payor: MEDICARE / Plan: MEDICARE PART A AND B / Product Type: *No Product type* /   Authorization or Plan of Care date Range: 4/7/2025 to 6/7/2025          Recert Medicare Dates: 6/7/2025 to 8/7/2025    Current Problem  1. Right wrist pain  Follow Up In Occupational Therapy        Precautions     SUBJECTIVE:  \"I bought 1# weights\"     Pain :      Rest 0/10  Very Intermittent 2-3/10   Location: Left CMC/radial wrist   Description: Sharp     Performing HEP: Yes     OBJECTIVE :  Decreasing TTP over CMC, radial F/A     Treatment:    Modalities: 15, 10 Min   Ultrasound tx:  3 MHZ  1.5 w/cm2 Continuous cycle over Left radial wrist/CMC    Area x 10 mins (small US head)   Fluidotherapy treatment Right forearm, wrist and hand with AROM x 15 min    Therapeutic Exercise: min   Review of current UE strength HEP with modifications on F/A positioning    Manual Therapy:  15  min  STM forearm muscles  PROM/AAROM wrist , F/A, thumb all planes   Thumb Distraction     Therapeutic Activity:  min     Neuromuscular Re-education:  min    Orthosis:   min    Wound Care:     min    Self Care/ADL   min    Other Treatment:   min    ASSESSMENT:  Pt 's occurrence of pain episodes are decreasing with good tolerance to light strengthening in HEP.    PLAN:   Continue with POC.     Janet Menezes MS, OTR/L [id number]                                 "

## 2025-06-30 ENCOUNTER — TREATMENT (OUTPATIENT)
Dept: OCCUPATIONAL THERAPY | Facility: CLINIC | Age: 65
End: 2025-06-30
Payer: MEDICARE

## 2025-06-30 DIAGNOSIS — M25.531 RIGHT WRIST PAIN: Primary | ICD-10-CM

## 2025-06-30 PROCEDURE — 97022 WHIRLPOOL THERAPY: CPT | Mod: GO

## 2025-06-30 PROCEDURE — 97110 THERAPEUTIC EXERCISES: CPT | Mod: GO

## 2025-06-30 PROCEDURE — 97140 MANUAL THERAPY 1/> REGIONS: CPT | Mod: GO

## 2025-06-30 NOTE — PROGRESS NOTES
"Occupational Therapy   Occupational Therapy Treatment    Patient Name: Reta Davis  MRN: [id number]  Today's Date: 6/30/2025  Time Calculation  Start Time: 1330  Stop Time: 1415  Time Calculation (min): 45 min    Insurance:  Visit number: 9  Insurance Type: Payor: MEDICARE / Plan: MEDICARE PART A AND B / Product Type: *No Product type* /   Authorization or Plan of Care date Range: 4/7/2025 to 6/7/2025          Recert Medicare Dates: 6/7/2025 to 8/7/2025    Current Problem  1. Right wrist pain  Follow Up In Occupational Therapy        Precautions     SUBJECTIVE:  \"I think the strengthening is helping, feels more stable.\"    Pain :      Rest 0/10  Very Intermittent, did not rate on pain scale  Location: Left CMC/radial wrist   Description: Sharp     Performing HEP: Yes     OBJECTIVE :      Treatment:    Modalities: 15 Min  Fluidotherapy treatment Right forearm, wrist and hand with AROM x 15 min    Therapeutic Exercise:10  min   Resistive thumb extension using tubigrip D,E sleeve (1\"width) 5 reps x 2 sets (added to   HEP).    Wrist extension 1# x 12-15   Wrist flexion Phase I, supinated F/A 0 wt x 5, G- plane 1# x 10   RD 0 wt x 8 reps     Manual Therapy:  20  min  STM forearm muscles  PROM/AAROM wrist , F/A, thumb all planes   Thumb Distraction     Therapeutic Activity:  min     Neuromuscular Re-education:  min    Orthosis:   min    Wound Care:     min    Self Care/ADL   min    Other Treatment:   min    ASSESSMENT:  Ultrasound deferred , as pt reports feeling better, decrease pain and not needed at this point.   Pt tolerated progressed resistance with thumb extension using tubigrip piece and now able to perform RD motion/strength exercise.     PLAN:   Continue with POC. Reassess NV with probable discharge to HEP.    Janet Menezes MS, OTR/L [id number]                                     "

## 2025-07-14 ENCOUNTER — TREATMENT (OUTPATIENT)
Dept: OCCUPATIONAL THERAPY | Facility: CLINIC | Age: 65
End: 2025-07-14
Payer: MEDICARE

## 2025-07-14 DIAGNOSIS — M25.531 RIGHT WRIST PAIN: ICD-10-CM

## 2025-07-14 PROCEDURE — 97140 MANUAL THERAPY 1/> REGIONS: CPT | Mod: GO

## 2025-07-14 PROCEDURE — 97110 THERAPEUTIC EXERCISES: CPT | Mod: GO

## 2025-07-14 PROCEDURE — 97022 WHIRLPOOL THERAPY: CPT | Mod: GO

## 2025-07-14 NOTE — PROGRESS NOTES
"Occupational Therapy   Occupational Therapy Discharge/Treatment    Patient Name: Reta Davis  MRN: [id number]  Today's Date: 7/14/2025  Time Calculation  Start Time: 1100  Stop Time: 1150  Time Calculation (min): 50 min    Insurance:  Visit number: 10  Insurance Type: Payor: MEDICARE / Plan: MEDICARE PART A AND B / Product Type: *No Product type* /   Authorization or Plan of Care date Range: 4/7/2025 to 6/7/2025          Recert Medicare Dates: 6/7/2025 to 8/7/2025    Current Problem  1. Right wrist pain  Follow Up In Occupational Therapy        Precautions     SUBJECTIVE:  \"It feels better.\" Pt reports she is now able to complete all ADLS and IADLs as previous.     Pain :      Rest 0/10  Fxal use 0/10 Pain only when bumping hand/wrist   Location: Left CMC/radial wrist   Description: Sharp     Performing HEP: Yes     OBJECTIVE :  HAND STRENGTH (Lbs)   R L   Dynamometer  55 55   Lateral Pinch 8.5 10   3jaw Pinch  Tip Pinch  9  7 8.5  5.5      Treatment:    Modalities: 15 Min  Fluidotherapy treatment Right forearm, wrist and hand with AROM x 15 min    Therapeutic Exercise:20  min   Objective measurements taken. See above for details   Reassessment completed. See above for details.     Extensive review of HEP, including F/A, wrist stretches, UE strengthening   and /pinch strength w/yellow ball.   Adding : Isometric thumb extension instruction and practice hold 3-5    secs, 4 reps    Manual Therapy: 15  min  STM forearm muscles  PROM/AAROM wrist , F/A, thumb all planes   Thumb Distraction     Therapeutic Activity:  min     Neuromuscular Re-education:  min    Orthosis:   min    Wound Care:     min    Self Care/ADL   min    Other Treatment:   min    ASSESSMENT:  No pain reports with fxal daily activities, or with /pinch assessments.   Slight increases in /pinch strengths. Excellent compliance with HEP.     PLAN:   Resolved       OT Goals       Patient to be independent with HEP and pain reduction techs to " further fxal progress and ability with decreased pain levels to 2-3/10 or less.   (Met)       Start:  04/07/25    Expected End:  08/07/25    Resolved:  07/14/25         Patient to be independent with conservative management principles of diagnosis, including splinting, home modalities and joint protection/UE lifting techniques in order to decrease overall pain and symptoms of UE for improved fxal use.   (Met)       Start:  04/07/25    Expected End:  06/06/25    Resolved:  06/12/25         Patient to increase lateral pinch to 10# or better to open containers, jars. (Adequate for Discharge)       Start:  04/07/25    Expected End:  08/07/25    Resolved:  07/14/25         Patient to increase 3 pt pinch strength to 9# or better open/close, manipulate household objects.  (Met)       Start:  04/07/25    Expected End:  08/07/25    Resolved:  07/14/25            OT Problem       PATIENT STATED GOAL: Patient to use right arm/hand for feeding, grooming and ADLS with increase ease and ability by 50%.  (Met)       Start:  04/07/25    Expected End:  08/07/25    Resolved:  07/14/25             Discharge OT services. Patient completed  10 tx sessions. Pt in agreement with plan and will continue efforts outside of clinic with HEP.     Janet Menezes MS, OTR/L [id number]

## 2025-08-26 LAB
BASOPHILS # BLD AUTO: 38 CELLS/UL (ref 0–200)
BASOPHILS NFR BLD AUTO: 0.6 %
EOSINOPHIL # BLD AUTO: 109 CELLS/UL (ref 15–500)
EOSINOPHIL NFR BLD AUTO: 1.7 %
ERYTHROCYTE [DISTWIDTH] IN BLOOD BY AUTOMATED COUNT: 11.7 % (ref 11–15)
FERRITIN SERPL-MCNC: 83 NG/ML (ref 16–288)
HCT VFR BLD AUTO: 42.6 % (ref 35–45)
HGB BLD-MCNC: 14.3 G/DL (ref 11.7–15.5)
IRON SATN MFR SERPL: 27 % (CALC) (ref 16–45)
IRON SERPL-MCNC: 109 MCG/DL (ref 45–160)
LYMPHOCYTES # BLD AUTO: 960 CELLS/UL (ref 850–3900)
LYMPHOCYTES NFR BLD AUTO: 15 %
MCH RBC QN AUTO: 32.5 PG (ref 27–33)
MCHC RBC AUTO-ENTMCNC: 33.6 G/DL (ref 32–36)
MCV RBC AUTO: 96.8 FL (ref 80–100)
MONOCYTES # BLD AUTO: 352 CELLS/UL (ref 200–950)
MONOCYTES NFR BLD AUTO: 5.5 %
NEUTROPHILS # BLD AUTO: 4941 CELLS/UL (ref 1500–7800)
NEUTROPHILS NFR BLD AUTO: 77.2 %
PLATELET # BLD AUTO: 246 THOUSAND/UL (ref 140–400)
PMV BLD REES-ECKER: 10.1 FL (ref 7.5–12.5)
QUEST DIFF COMMENT: NORMAL
RBC # BLD AUTO: 4.4 MILLION/UL (ref 3.8–5.1)
RETICS #: NORMAL CELLS/UL (ref 20000–80000)
RETICS/RBC NFR AUTO: 1.4 %
TIBC SERPL-MCNC: 404 MCG/DL (CALC) (ref 250–450)
WBC # BLD AUTO: 6.4 THOUSAND/UL (ref 3.8–10.8)

## 2025-09-05 ENCOUNTER — APPOINTMENT (OUTPATIENT)
Dept: PRIMARY CARE | Facility: CLINIC | Age: 65
End: 2025-09-05
Payer: MEDICARE

## 2025-09-05 ENCOUNTER — HOSPITAL ENCOUNTER (OUTPATIENT)
Dept: RADIOLOGY | Facility: CLINIC | Age: 65
Discharge: HOME | End: 2025-09-05
Payer: MEDICARE

## 2025-09-05 DIAGNOSIS — M54.50 ACUTE RIGHT-SIDED LOW BACK PAIN WITHOUT SCIATICA: ICD-10-CM

## 2025-09-05 PROBLEM — D50.9 IRON DEFICIENCY ANEMIA: Status: ACTIVE | Noted: 2025-09-05

## 2025-09-05 PROBLEM — C18.1 PRIMARY CANCER OF APPENDIX (MULTI): Status: RESOLVED | Noted: 2023-02-27 | Resolved: 2025-09-05

## 2025-09-05 PROCEDURE — 72110 X-RAY EXAM L-2 SPINE 4/>VWS: CPT

## 2025-09-05 ASSESSMENT — PATIENT HEALTH QUESTIONNAIRE - PHQ9
SUM OF ALL RESPONSES TO PHQ9 QUESTIONS 1 AND 2: 0
2. FEELING DOWN, DEPRESSED OR HOPELESS: NOT AT ALL
2. FEELING DOWN, DEPRESSED OR HOPELESS: NOT AT ALL
1. LITTLE INTEREST OR PLEASURE IN DOING THINGS: NOT AT ALL
SUM OF ALL RESPONSES TO PHQ9 QUESTIONS 1 AND 2: 0
1. LITTLE INTEREST OR PLEASURE IN DOING THINGS: NOT AT ALL

## 2025-09-05 ASSESSMENT — ENCOUNTER SYMPTOMS
OCCASIONAL FEELINGS OF UNSTEADINESS: 0
DEPRESSION: 0
LOSS OF SENSATION IN FEET: 0

## 2025-10-07 ENCOUNTER — APPOINTMENT (OUTPATIENT)
Dept: RADIOLOGY | Facility: CLINIC | Age: 65
End: 2025-10-07
Payer: MEDICARE

## 2025-11-20 ENCOUNTER — APPOINTMENT (OUTPATIENT)
Dept: PRIMARY CARE | Facility: CLINIC | Age: 65
End: 2025-11-20
Payer: MEDICARE

## 2026-04-20 ENCOUNTER — APPOINTMENT (OUTPATIENT)
Dept: AUDIOLOGY | Facility: CLINIC | Age: 66
End: 2026-04-20
Payer: MEDICARE

## 2026-04-20 ENCOUNTER — APPOINTMENT (OUTPATIENT)
Dept: OTOLARYNGOLOGY | Facility: CLINIC | Age: 66
End: 2026-04-20
Payer: MEDICARE